# Patient Record
Sex: MALE | Race: WHITE | NOT HISPANIC OR LATINO | Employment: FULL TIME | URBAN - METROPOLITAN AREA
[De-identification: names, ages, dates, MRNs, and addresses within clinical notes are randomized per-mention and may not be internally consistent; named-entity substitution may affect disease eponyms.]

---

## 2017-03-01 DIAGNOSIS — F40.248 FEAR OF PUBLIC SPEAKING: ICD-10-CM

## 2017-03-01 RX ORDER — PROPRANOLOL HYDROCHLORIDE 10 MG/1
10 TABLET ORAL 2 TIMES DAILY PRN
Qty: 180 TABLET | Refills: 3 | Status: SHIPPED | OUTPATIENT
Start: 2017-03-01 | End: 2019-03-26

## 2017-09-23 DIAGNOSIS — E78.00 HYPERCHOLESTEROLEMIA: ICD-10-CM

## 2017-09-23 RX ORDER — ROSUVASTATIN CALCIUM 10 MG/1
TABLET, COATED ORAL
Qty: 30 TABLET | Refills: 0 | Status: SHIPPED | OUTPATIENT
Start: 2017-09-23 | End: 2017-10-31 | Stop reason: SDUPTHER

## 2017-10-31 DIAGNOSIS — E78.00 HYPERCHOLESTEROLEMIA: ICD-10-CM

## 2017-10-31 RX ORDER — ROSUVASTATIN CALCIUM 10 MG/1
TABLET, COATED ORAL
Qty: 30 TABLET | Refills: 0 | Status: SHIPPED | OUTPATIENT
Start: 2017-10-31 | End: 2017-12-14 | Stop reason: SDUPTHER

## 2017-12-14 DIAGNOSIS — E78.00 HYPERCHOLESTEROLEMIA: ICD-10-CM

## 2017-12-15 RX ORDER — ROSUVASTATIN CALCIUM 10 MG/1
TABLET, COATED ORAL
Qty: 30 TABLET | Refills: 0 | Status: SHIPPED | OUTPATIENT
Start: 2017-12-15 | End: 2018-01-25 | Stop reason: SDUPTHER

## 2018-01-10 ENCOUNTER — HOSPITAL ENCOUNTER (OUTPATIENT)
Dept: RADIOLOGY | Facility: OTHER | Age: 48
Discharge: HOME | End: 2018-01-10
Attending: UROLOGY
Payer: COMMERCIAL

## 2018-01-10 ENCOUNTER — TELEPHONE (OUTPATIENT)
Dept: UROLOGY | Facility: CLINIC | Age: 48
End: 2018-01-10

## 2018-01-10 DIAGNOSIS — R10.9 LEFT FLANK PAIN: ICD-10-CM

## 2018-01-10 DIAGNOSIS — N20.0 RENAL STONES: Primary | ICD-10-CM

## 2018-01-10 DIAGNOSIS — M54.89 BACK PAIN DUE TO INFLAMMATORY PROCESS: ICD-10-CM

## 2018-01-10 PROCEDURE — 74176 CT ABD & PELVIS W/O CONTRAST: CPT | Mod: 26,,, | Performed by: RADIOLOGY

## 2018-01-10 PROCEDURE — 74176 CT ABD & PELVIS W/O CONTRAST: CPT | Mod: TC

## 2018-01-10 NOTE — TELEPHONE ENCOUNTER
----- Message from Korey Howe MD sent at 1/10/2018  8:41 AM CST -----  Pt is coming over now for emergency appt  Prostatitis    He is a friend    Thanks    sc

## 2018-01-10 NOTE — PROGRESS NOTES
Subjective:      Eve Choi III is a 47 y.o. male who returns today regarding his     Pt has seronegative rheumatoid arthritis    Pt has left side back pain  No history of stones    No hematuria     No  symptoms    Emergency appt        The following portions of the patient's history were reviewed and updated as appropriate: allergies, current medications, past family history, past medical history, past social history, past surgical history and problem list.    Review of Systems  Pertinent items are noted in HPI.  A comprehensive multipoint review of systems was negative except as otherwise stated in the HPI.     Objective:   Vitals: There were no vitals taken for this visit.    Physical Exam   General: alert and oriented, no acute distress  Respiratory: Symmetric expansion, non-labored breathing  Cardiovascular: no peripheral edema  Abdomen: soft, non distended  Skin: normal coloration and turgor, no rashes, no suspicious skin lesions noted  Neuro: no gross deficits  Psych: normal judgment and insight, normal mood/affect and non-anxious    Physical Exam    Lab Review   Urinalysis demonstrates negative for all components  Lab Results   Component Value Date    WBC 10.05 09/19/2016    HGB 13.9 (L) 09/19/2016    HCT 39.5 (L) 09/19/2016    MCV 88 09/19/2016     09/19/2016     Lab Results   Component Value Date    CREATININE 0.9 09/19/2016    BUN 20 09/19/2016       Imaging  CT renal stone study  No hydro  2mm left renal stone and two (2) 2mm right renal stones    Spinal stensosis at L4-L5  Assessment and Plan:   Renal stones; small nonosbtructing; do not appear to be source of back pain  -     X-Ray KUB; Future; Expected date: 01/10/2018  -     X-Ray KUB; Future; Expected date: 01/10/2018  -     Comprehensive metabolic panel; Future; Expected date: 01/10/2018  -     Uric acid; Future; Expected date: 01/10/2018  High fluid, low salt diet  Avoid coffee, tea and cola  Drink water and diet lemonade      RTC 2  months     Discussed ESWL vs URS    Back pain due to inflammatory process  -     X-Ray KUB; Future; Expected date: 01/10/2018  See rheumatologist  Discussed spinal stenosis with pt; no acute neurologic findings

## 2018-01-11 ENCOUNTER — HOSPITAL ENCOUNTER (OUTPATIENT)
Dept: RADIOLOGY | Facility: OTHER | Age: 48
Discharge: HOME | End: 2018-01-11
Attending: UROLOGY
Payer: COMMERCIAL

## 2018-01-11 DIAGNOSIS — N20.0 RENAL STONES: ICD-10-CM

## 2018-01-11 DIAGNOSIS — N20.0 RENAL STONES: Primary | ICD-10-CM

## 2018-01-11 PROCEDURE — 74018 RADEX ABDOMEN 1 VIEW: CPT | Mod: 26,,, | Performed by: RADIOLOGY

## 2018-01-11 PROCEDURE — 74018 RADEX ABDOMEN 1 VIEW: CPT | Mod: TC,FY

## 2018-01-11 NOTE — PROGRESS NOTES
I called pt with RUFINA and lab results  He will continue the dietary modifications we discussed and see me in 6 months with RUFINA and US  He will see rheumatology and neurosurgery for his back

## 2018-01-25 DIAGNOSIS — E78.00 HYPERCHOLESTEROLEMIA: ICD-10-CM

## 2018-01-25 RX ORDER — ROSUVASTATIN CALCIUM 10 MG/1
TABLET, COATED ORAL
Qty: 30 TABLET | Refills: 0 | Status: SHIPPED | OUTPATIENT
Start: 2018-01-25 | End: 2018-02-28 | Stop reason: SDUPTHER

## 2018-02-03 ENCOUNTER — OFFICE VISIT (OUTPATIENT)
Dept: URGENT CARE | Facility: CLINIC | Age: 48
End: 2018-02-03
Payer: COMMERCIAL

## 2018-02-03 VITALS
BODY MASS INDEX: 34.72 KG/M2 | WEIGHT: 262 LBS | RESPIRATION RATE: 18 BRPM | TEMPERATURE: 99 F | SYSTOLIC BLOOD PRESSURE: 125 MMHG | HEIGHT: 73 IN | DIASTOLIC BLOOD PRESSURE: 94 MMHG | HEART RATE: 100 BPM | OXYGEN SATURATION: 95 %

## 2018-02-03 DIAGNOSIS — J10.1 INFLUENZA B: Primary | ICD-10-CM

## 2018-02-03 LAB
CTP QC/QA: YES
FLUAV AG NPH QL: NEGATIVE
FLUBV AG NPH QL: POSITIVE

## 2018-02-03 PROCEDURE — 3008F BODY MASS INDEX DOCD: CPT | Mod: S$GLB,,, | Performed by: FAMILY MEDICINE

## 2018-02-03 PROCEDURE — 99214 OFFICE O/P EST MOD 30 MIN: CPT | Mod: S$GLB,,, | Performed by: FAMILY MEDICINE

## 2018-02-03 PROCEDURE — 87804 INFLUENZA ASSAY W/OPTIC: CPT | Mod: QW,S$GLB,, | Performed by: FAMILY MEDICINE

## 2018-02-03 RX ORDER — OSELTAMIVIR PHOSPHATE 75 MG/1
75 CAPSULE ORAL 2 TIMES DAILY
Qty: 10 CAPSULE | Refills: 0 | Status: SHIPPED | OUTPATIENT
Start: 2018-02-03 | End: 2018-02-08

## 2018-02-03 RX ORDER — BENZONATATE 100 MG/1
100 CAPSULE ORAL EVERY 6 HOURS PRN
Qty: 30 CAPSULE | Refills: 1 | Status: SHIPPED | OUTPATIENT
Start: 2018-02-03 | End: 2018-06-07

## 2018-02-03 NOTE — PROGRESS NOTES
"Subjective:       Patient ID: Eve Choi III is a 47 y.o. male.    Vitals:  height is 6' 1" (1.854 m) and weight is 118.8 kg (262 lb). His oral temperature is 99 °F (37.2 °C). His blood pressure is 125/94 (abnormal) and his pulse is 100. His respiration is 18 and oxygen saturation is 95%.     Chief Complaint: Cough    symptoms started 3 days ago.  Received flu shot 2 days ago.      Cough   This is a new problem. The current episode started in the past 7 days. The problem has been gradually worsening. The cough is productive of sputum. Associated symptoms include a fever, headaches, myalgias, nasal congestion and a sore throat. Pertinent negatives include no chest pain, chills, ear pain, eye redness, shortness of breath or wheezing. Treatments tried: nsaids. The treatment provided mild relief. His past medical history is significant for bronchitis and pneumonia.     Review of Systems   Constitution: Positive for fever. Negative for chills and malaise/fatigue.   HENT: Positive for congestion and sore throat. Negative for ear pain and hoarse voice.    Eyes: Negative for discharge and redness.   Cardiovascular: Negative for chest pain, dyspnea on exertion and leg swelling.   Respiratory: Positive for cough. Negative for shortness of breath, sputum production and wheezing.    Musculoskeletal: Positive for myalgias.   Gastrointestinal: Negative for abdominal pain and nausea.   Neurological: Positive for headaches.       Objective:      Physical Exam   Constitutional: He appears well-developed and well-nourished.   HENT:   Head: Normocephalic and atraumatic.   Nose: Mucosal edema and rhinorrhea present. Right sinus exhibits maxillary sinus tenderness. Right sinus exhibits no frontal sinus tenderness. Left sinus exhibits maxillary sinus tenderness. Left sinus exhibits no frontal sinus tenderness.   Mouth/Throat: Posterior oropharyngeal erythema present. No oropharyngeal exudate. Tonsils are 1+ on the right. Tonsils are " 1+ on the left.   Cardiovascular: Normal rate, regular rhythm and normal heart sounds.    Pulmonary/Chest: Effort normal and breath sounds normal.   Lymphadenopathy:     He has cervical adenopathy.   Nursing note and vitals reviewed.      Assessment:       1. Influenza B        Plan:         Influenza B  -     POCT Influenza A/B  -     oseltamivir (TAMIFLU) 75 MG capsule; Take 1 capsule (75 mg total) by mouth 2 (two) times daily.  Dispense: 10 capsule; Refill: 0  -     benzonatate (TESSALON PERLES) 100 MG capsule; Take 1 capsule (100 mg total) by mouth every 6 (six) hours as needed for Cough.  Dispense: 30 capsule; Refill: 1

## 2018-02-03 NOTE — PATIENT INSTRUCTIONS

## 2018-02-28 DIAGNOSIS — E78.00 HYPERCHOLESTEROLEMIA: ICD-10-CM

## 2018-02-28 RX ORDER — ROSUVASTATIN CALCIUM 10 MG/1
TABLET, COATED ORAL
Qty: 30 TABLET | Refills: 0 | Status: SHIPPED | OUTPATIENT
Start: 2018-02-28 | End: 2018-04-06 | Stop reason: SDUPTHER

## 2018-04-06 DIAGNOSIS — E78.00 HYPERCHOLESTEROLEMIA: ICD-10-CM

## 2018-04-06 RX ORDER — ROSUVASTATIN CALCIUM 10 MG/1
TABLET, COATED ORAL
Qty: 30 TABLET | Refills: 0 | Status: SHIPPED | OUTPATIENT
Start: 2018-04-06 | End: 2018-09-05 | Stop reason: SDUPTHER

## 2018-05-30 DIAGNOSIS — Z00.00 ROUTINE GENERAL MEDICAL EXAMINATION AT A HEALTH CARE FACILITY: Primary | ICD-10-CM

## 2018-06-07 ENCOUNTER — OFFICE VISIT (OUTPATIENT)
Dept: PULMONOLOGY | Facility: CLINIC | Age: 48
End: 2018-06-07
Payer: COMMERCIAL

## 2018-06-07 ENCOUNTER — HOSPITAL ENCOUNTER (OUTPATIENT)
Dept: CARDIOLOGY | Facility: CLINIC | Age: 48
Discharge: HOME | End: 2018-06-07
Payer: COMMERCIAL

## 2018-06-07 ENCOUNTER — CLINICAL SUPPORT (OUTPATIENT)
Dept: INTERNAL MEDICINE | Facility: CLINIC | Age: 48
End: 2018-06-07
Payer: COMMERCIAL

## 2018-06-07 VITALS
WEIGHT: 264 LBS | SYSTOLIC BLOOD PRESSURE: 120 MMHG | DIASTOLIC BLOOD PRESSURE: 77 MMHG | HEART RATE: 73 BPM | HEIGHT: 73 IN | BODY MASS INDEX: 34.99 KG/M2

## 2018-06-07 DIAGNOSIS — Z00.00 ROUTINE GENERAL MEDICAL EXAMINATION AT A HEALTH CARE FACILITY: Primary | ICD-10-CM

## 2018-06-07 DIAGNOSIS — Z00.00 ROUTINE GENERAL MEDICAL EXAMINATION AT A HEALTH CARE FACILITY: ICD-10-CM

## 2018-06-07 DIAGNOSIS — Z00.00 ANNUAL PHYSICAL EXAM: Primary | ICD-10-CM

## 2018-06-07 LAB
ALBUMIN SERPL BCP-MCNC: 3.9 G/DL
ALP SERPL-CCNC: 52 U/L
ALT SERPL W/O P-5'-P-CCNC: 21 U/L
ANION GAP SERPL CALC-SCNC: 8 MMOL/L
AST SERPL-CCNC: 22 U/L
BILIRUB SERPL-MCNC: 0.6 MG/DL
BUN SERPL-MCNC: 14 MG/DL
CALCIUM SERPL-MCNC: 9.2 MG/DL
CHLORIDE SERPL-SCNC: 108 MMOL/L
CHOLEST SERPL-MCNC: 189 MG/DL
CHOLEST/HDLC SERPL: 3.9 {RATIO}
CO2 SERPL-SCNC: 24 MMOL/L
COMPLEXED PSA SERPL-MCNC: 0.18 NG/ML
CREAT SERPL-MCNC: 1 MG/DL
ERYTHROCYTE [DISTWIDTH] IN BLOOD BY AUTOMATED COUNT: 12.6 %
EST. GFR  (AFRICAN AMERICAN): >60 ML/MIN/1.73 M^2
EST. GFR  (NON AFRICAN AMERICAN): >60 ML/MIN/1.73 M^2
ESTIMATED AVG GLUCOSE: 105 MG/DL
FOLATE SERPL-MCNC: 13.3 NG/ML
GLUCOSE SERPL-MCNC: 100 MG/DL
HBA1C MFR BLD HPLC: 5.3 %
HCT VFR BLD AUTO: 42.8 %
HDLC SERPL-MCNC: 48 MG/DL
HDLC SERPL: 25.4 %
HGB BLD-MCNC: 14.6 G/DL
LDLC SERPL CALC-MCNC: 92.8 MG/DL
MCH RBC QN AUTO: 29.9 PG
MCHC RBC AUTO-ENTMCNC: 34.1 G/DL
MCV RBC AUTO: 88 FL
NONHDLC SERPL-MCNC: 141 MG/DL
PLATELET # BLD AUTO: 315 K/UL
PMV BLD AUTO: 10 FL
POTASSIUM SERPL-SCNC: 4.5 MMOL/L
PROT SERPL-MCNC: 7 G/DL
RBC # BLD AUTO: 4.88 M/UL
SODIUM SERPL-SCNC: 140 MMOL/L
TRIGL SERPL-MCNC: 241 MG/DL
TSH SERPL DL<=0.005 MIU/L-ACNC: 2.41 UIU/ML
WBC # BLD AUTO: 10.81 K/UL

## 2018-06-07 PROCEDURE — 80061 LIPID PANEL: CPT

## 2018-06-07 PROCEDURE — 93000 ELECTROCARDIOGRAM COMPLETE: CPT | Mod: S$GLB,,, | Performed by: INTERNAL MEDICINE

## 2018-06-07 PROCEDURE — 85027 COMPLETE CBC AUTOMATED: CPT

## 2018-06-07 PROCEDURE — 99999 PR PBB SHADOW E&M-EST. PATIENT-LVL III: CPT | Mod: PBBFAC,,, | Performed by: INTERNAL MEDICINE

## 2018-06-07 PROCEDURE — 84443 ASSAY THYROID STIM HORMONE: CPT

## 2018-06-07 PROCEDURE — 99396 PREV VISIT EST AGE 40-64: CPT | Mod: S$GLB,,, | Performed by: INTERNAL MEDICINE

## 2018-06-07 PROCEDURE — 84153 ASSAY OF PSA TOTAL: CPT

## 2018-06-07 PROCEDURE — 82746 ASSAY OF FOLIC ACID SERUM: CPT

## 2018-06-07 PROCEDURE — 80053 COMPREHEN METABOLIC PANEL: CPT

## 2018-06-07 PROCEDURE — 83036 HEMOGLOBIN GLYCOSYLATED A1C: CPT

## 2018-06-07 PROCEDURE — 3078F DIAST BP <80 MM HG: CPT | Mod: CPTII,S$GLB,, | Performed by: INTERNAL MEDICINE

## 2018-06-07 PROCEDURE — 82608 B-12 BINDING CAPACITY: CPT

## 2018-06-07 PROCEDURE — 3074F SYST BP LT 130 MM HG: CPT | Mod: CPTII,S$GLB,, | Performed by: INTERNAL MEDICINE

## 2018-06-07 RX ORDER — ASPIRIN 81 MG/1
81 TABLET ORAL DAILY
COMMUNITY
End: 2023-02-01

## 2018-06-07 NOTE — LETTER
June 7, 2018    Eve Choi III  [address] [facility] [id number]             [facility]  [address]  [facility] [address]  Phone: [phone] DearLynton,        Thank you for allowing me to serve you and perform your Executive Health exam on 6/7/2018. This letter will serve as a brief summary of the physical findings and laboratory/studies performed and recommendations at this time. Except for your elevated triglycerides aggravated by weight, this is a normal exam. 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        If you have any questions or concerns, please don't hesitate to call.    Sincerely,        Jd Sanchez MD

## 2018-06-07 NOTE — PROGRESS NOTES
Subjective:       Patient ID: Eve Choi III is a 47 y.o. male.    Chief Complaint: Annual Exam    HPI  46 yo  comes for his periodic health exam. He went to [facility] for a second opinion regarding his arthritis condition and was diagnosed with anklyosing spondylitis and is taking humira.  He still  Has some generalized stiffness  and will be following up with is local rheumatologist next week. Has some vague symptoms of peripheral neuropathy that comes and goes. Will check B-12 and folate levels. I did not see him in 2017.   Review of Systems   Constitutional: Negative.    HENT: Negative.    Eyes: Negative.    Respiratory: Negative.    Cardiovascular: Negative.    Gastrointestinal: Negative.    Genitourinary: Negative.    Musculoskeletal: Positive for arthralgias and neck pain.   Skin: Negative.    Neurological: Negative.         Vague peripheral neuropathy   Psychiatric/Behavioral: Negative.    All other systems reviewed and are negative.      Objective:      Physical Exam   Constitutional: He is oriented to person, place, and time. He appears well-developed and well-nourished.   Obese; BMI:34   HENT:   Head: Normocephalic and atraumatic.   Right Ear: External ear normal.   Left Ear: External ear normal.   Eyes: Conjunctivae and EOM are normal. Pupils are equal, round, and reactive to light.   Neck: Normal range of motion. Neck supple.   Cardiovascular: Normal rate, regular rhythm and normal heart sounds.    Pulmonary/Chest: Effort normal and breath sounds normal.   Abdominal: Soft. Bowel sounds are normal.   Musculoskeletal: Normal range of motion.   Neurological: He is alert and oriented to person, place, and time. He has normal reflexes.   Skin: Skin is warm and dry.   Psychiatric: He has a normal mood and affect. His behavior is normal. Judgment and thought content normal.       Assessment:       No diagnosis found.    Plan:         Labs: Elevated triglycerides, all other parameters  are normal.EKG is normal. IMP: Type IV hyperlipidemia aggravated by weight. Ankylosing Spondilitis  B-12 and folate levels are normal.

## 2018-06-12 LAB — VIT B12 USB SERPL-MCNC: 909 PG/ML (ref 800–2600)

## 2018-09-05 DIAGNOSIS — E78.00 HYPERCHOLESTEROLEMIA: ICD-10-CM

## 2018-09-05 RX ORDER — ROSUVASTATIN CALCIUM 10 MG/1
TABLET, COATED ORAL
Qty: 30 TABLET | Refills: 0 | Status: SHIPPED | OUTPATIENT
Start: 2018-09-05 | End: 2018-10-20 | Stop reason: SDUPTHER

## 2018-10-20 DIAGNOSIS — E78.00 HYPERCHOLESTEROLEMIA: ICD-10-CM

## 2018-10-21 RX ORDER — ROSUVASTATIN CALCIUM 10 MG/1
TABLET, COATED ORAL
Qty: 30 TABLET | Refills: 0 | Status: SHIPPED | OUTPATIENT
Start: 2018-10-21 | End: 2018-11-22 | Stop reason: SDUPTHER

## 2018-11-22 DIAGNOSIS — E78.00 HYPERCHOLESTEROLEMIA: ICD-10-CM

## 2018-11-23 RX ORDER — ROSUVASTATIN CALCIUM 10 MG/1
TABLET, COATED ORAL
Qty: 30 TABLET | Refills: 0 | Status: SHIPPED | OUTPATIENT
Start: 2018-11-23 | End: 2018-12-26 | Stop reason: SDUPTHER

## 2018-12-26 DIAGNOSIS — E78.00 HYPERCHOLESTEROLEMIA: ICD-10-CM

## 2018-12-27 RX ORDER — ROSUVASTATIN CALCIUM 10 MG/1
TABLET, COATED ORAL
Qty: 30 TABLET | Refills: 0 | Status: SHIPPED | OUTPATIENT
Start: 2018-12-27 | End: 2019-02-22 | Stop reason: SDUPTHER

## 2019-02-22 DIAGNOSIS — E78.00 HYPERCHOLESTEROLEMIA: ICD-10-CM

## 2019-02-22 RX ORDER — ROSUVASTATIN CALCIUM 10 MG/1
TABLET, COATED ORAL
Qty: 30 TABLET | Refills: 0 | Status: SHIPPED | OUTPATIENT
Start: 2019-02-22 | End: 2019-04-10 | Stop reason: SDUPTHER

## 2019-03-24 ENCOUNTER — TELEPHONE (OUTPATIENT)
Dept: UROLOGY | Facility: HOSPITAL | Age: 49
End: 2019-03-24

## 2019-03-24 DIAGNOSIS — N20.0 RENAL STONES: Primary | ICD-10-CM

## 2019-03-24 NOTE — TELEPHONE ENCOUNTER
Pt called  Recently saw his PCP and noted to have micro hematuria and increased Cr 1.35 with elevated BUN  I instructed to hydrate well and have BMP drawn Monday AM.    Please make appt for KUB and renal US Tuesday AM and see me after.  Overbook if necessary

## 2019-03-25 ENCOUNTER — HOSPITAL ENCOUNTER (OUTPATIENT)
Dept: RADIOLOGY | Facility: OTHER | Age: 49
Discharge: HOME | End: 2019-03-25
Attending: UROLOGY
Payer: COMMERCIAL

## 2019-03-25 DIAGNOSIS — N20.0 RENAL STONES: ICD-10-CM

## 2019-03-25 PROCEDURE — 74018 XR KUB: ICD-10-PCS | Mod: 26,,, | Performed by: RADIOLOGY

## 2019-03-25 PROCEDURE — 76770 US EXAM ABDO BACK WALL COMP: CPT | Mod: 26,,, | Performed by: RADIOLOGY

## 2019-03-25 PROCEDURE — 76770 US KIDNEY: ICD-10-PCS | Mod: 26,,, | Performed by: RADIOLOGY

## 2019-03-25 PROCEDURE — 74018 RADEX ABDOMEN 1 VIEW: CPT | Mod: TC,FY

## 2019-03-25 PROCEDURE — 76770 US EXAM ABDO BACK WALL COMP: CPT | Mod: TC

## 2019-03-25 PROCEDURE — 74018 RADEX ABDOMEN 1 VIEW: CPT | Mod: 26,,, | Performed by: RADIOLOGY

## 2019-03-26 ENCOUNTER — OFFICE VISIT (OUTPATIENT)
Dept: UROLOGY | Facility: CLINIC | Age: 49
End: 2019-03-26
Payer: COMMERCIAL

## 2019-03-26 VITALS
SYSTOLIC BLOOD PRESSURE: 118 MMHG | HEART RATE: 65 BPM | HEIGHT: 73 IN | DIASTOLIC BLOOD PRESSURE: 80 MMHG | BODY MASS INDEX: 34.97 KG/M2 | WEIGHT: 263.88 LBS

## 2019-03-26 DIAGNOSIS — N19 ACUTE PRERENAL AZOTEMIA: ICD-10-CM

## 2019-03-26 DIAGNOSIS — R31.29 MICROSCOPIC HEMATURIA: ICD-10-CM

## 2019-03-26 DIAGNOSIS — N20.0 RENAL STONES: Primary | ICD-10-CM

## 2019-03-26 PROCEDURE — 3074F SYST BP LT 130 MM HG: CPT | Mod: CPTII,S$GLB,, | Performed by: UROLOGY

## 2019-03-26 PROCEDURE — 3079F DIAST BP 80-89 MM HG: CPT | Mod: CPTII,S$GLB,, | Performed by: UROLOGY

## 2019-03-26 PROCEDURE — 3008F PR BODY MASS INDEX (BMI) DOCUMENTED: ICD-10-PCS | Mod: CPTII,S$GLB,, | Performed by: UROLOGY

## 2019-03-26 PROCEDURE — 3074F PR MOST RECENT SYSTOLIC BLOOD PRESSURE < 130 MM HG: ICD-10-PCS | Mod: CPTII,S$GLB,, | Performed by: UROLOGY

## 2019-03-26 PROCEDURE — 3008F BODY MASS INDEX DOCD: CPT | Mod: CPTII,S$GLB,, | Performed by: UROLOGY

## 2019-03-26 PROCEDURE — 3079F PR MOST RECENT DIASTOLIC BLOOD PRESSURE 80-89 MM HG: ICD-10-PCS | Mod: CPTII,S$GLB,, | Performed by: UROLOGY

## 2019-03-26 PROCEDURE — 99214 PR OFFICE/OUTPT VISIT, EST, LEVL IV, 30-39 MIN: ICD-10-PCS | Mod: S$GLB,,, | Performed by: UROLOGY

## 2019-03-26 PROCEDURE — 99214 OFFICE O/P EST MOD 30 MIN: CPT | Mod: S$GLB,,, | Performed by: UROLOGY

## 2019-03-26 NOTE — PROGRESS NOTES
"Subjective:      Eve Choi III is a 48 y.o. male who returns today regarding his     Patient has a history of renal stones and ankylosing spondylitis    He had recent blood work done with his rheumatologist, Dr. Calvin Fernandes.  His creatinine was elevated at 1.3 which was up from his baseline.  Importantly, this was drawn after he had a rather intense workout.  He feels that he was dehydrated at the time    We repeated his creatinine with hydration and is now 1.0 which is normal    He had some mild left flank discomfort over the weekend which is resolved.  He was working out again this morning and after doing some crutches has some lower abdominal discomfort which appears positional    No he gross hematuria.  He was noted to have microscopic hematuria on a recent urinalysis  Urinalysis today is negative except trace protein.    The following portions of the patient's history were reviewed and updated as appropriate: allergies, current medications, past family history, past medical history, past social history, past surgical history and problem list.    Review of Systems  Pertinent items are noted in HPI.  A comprehensive multipoint review of systems was negative except as otherwise stated in the HPI.     Objective:   Vitals: /80 (BP Location: Left arm, Patient Position: Sitting, BP Method: Large (Automatic))   Pulse 65   Ht 6' 1" (1.854 m)   Wt 119.7 kg (263 lb 14.3 oz)   BMI 34.82 kg/m²     Physical Exam   General: alert and oriented, no acute distress  Respiratory: Symmetric expansion, non-labored breathing  Cardiovascular: normal to inspection  Abdomen: non distended   Skin: normal coloration and turgor, no rashes, no suspicious skin lesions noted  Neuro: no gross deficits  Psych: normal judgment and insight, normal mood/affect and non-anxious    Physical Exam    Lab Review   Urinalysis demonstrates negative for all components except trace protein    Lab Results   Component Value Date    WBC 10.81 " 06/07/2018    HGB 14.6 06/07/2018    HCT 42.8 06/07/2018    MCV 88 06/07/2018     06/07/2018     Lab Results   Component Value Date    CREATININE 1.0 03/25/2019    BUN 15 03/25/2019       Imaging  KUB no stone seen    Renal ultrasound 2 small nonobstructive left renal stones, no right renal stones    Assessment and Plan:   Renal stones; small nonobstructive, radiolucent  We discussed the possibility of gout or hyperuricosuria  Will obtain 24 hr urine    Discussed metabolic management, ureteroscopy and lithotripsy      Microscopic hematuria  Repeat urinalysis at next appointment  If there are more than 3 blood red blood cells per high-power field on the next urinalysis we will schedule cystoscopy      Acute prerenal azotemia resolved with hydration    Return to clinic 1 month  Follow-up with primary physician, Dr. Piña  Follow-up with Rheumatology common Dr. Fernandes

## 2019-04-10 DIAGNOSIS — E78.00 HYPERCHOLESTEROLEMIA: ICD-10-CM

## 2019-04-10 RX ORDER — ROSUVASTATIN CALCIUM 10 MG/1
TABLET, COATED ORAL
Qty: 30 TABLET | Refills: 0 | Status: SHIPPED | OUTPATIENT
Start: 2019-04-10 | End: 2019-05-09 | Stop reason: SDUPTHER

## 2019-04-30 ENCOUNTER — OFFICE VISIT (OUTPATIENT)
Dept: UROLOGY | Facility: CLINIC | Age: 49
End: 2019-04-30
Payer: COMMERCIAL

## 2019-04-30 VITALS
HEART RATE: 68 BPM | HEIGHT: 73 IN | BODY MASS INDEX: 34.85 KG/M2 | DIASTOLIC BLOOD PRESSURE: 79 MMHG | WEIGHT: 263 LBS | SYSTOLIC BLOOD PRESSURE: 122 MMHG

## 2019-04-30 DIAGNOSIS — N20.0 RENAL STONES: Primary | ICD-10-CM

## 2019-04-30 DIAGNOSIS — M54.89 BACK PAIN DUE TO INFLAMMATORY PROCESS: ICD-10-CM

## 2019-04-30 LAB
BILIRUB SERPL-MCNC: NORMAL MG/DL
BLOOD URINE, POC: NORMAL
COLOR, POC UA: NORMAL
GLUCOSE UR QL STRIP: NORMAL
KETONES UR QL STRIP: NORMAL
LEUKOCYTE ESTERASE URINE, POC: NORMAL
NITRITE, POC UA: NORMAL
PH, POC UA: 6
PROTEIN, POC: NORMAL
SPECIFIC GRAVITY, POC UA: 1.01
UROBILINOGEN, POC UA: NORMAL

## 2019-04-30 PROCEDURE — 3074F PR MOST RECENT SYSTOLIC BLOOD PRESSURE < 130 MM HG: ICD-10-PCS | Mod: CPTII,S$GLB,, | Performed by: UROLOGY

## 2019-04-30 PROCEDURE — 3074F SYST BP LT 130 MM HG: CPT | Mod: CPTII,S$GLB,, | Performed by: UROLOGY

## 2019-04-30 PROCEDURE — 81002 URINALYSIS NONAUTO W/O SCOPE: CPT | Mod: S$GLB,,, | Performed by: UROLOGY

## 2019-04-30 PROCEDURE — 81002 POCT URINE DIPSTICK WITHOUT MICROSCOPE: ICD-10-PCS | Mod: S$GLB,,, | Performed by: UROLOGY

## 2019-04-30 PROCEDURE — 3078F PR MOST RECENT DIASTOLIC BLOOD PRESSURE < 80 MM HG: ICD-10-PCS | Mod: CPTII,S$GLB,, | Performed by: UROLOGY

## 2019-04-30 PROCEDURE — 3008F PR BODY MASS INDEX (BMI) DOCUMENTED: ICD-10-PCS | Mod: CPTII,S$GLB,, | Performed by: UROLOGY

## 2019-04-30 PROCEDURE — 99214 OFFICE O/P EST MOD 30 MIN: CPT | Mod: 25,S$GLB,, | Performed by: UROLOGY

## 2019-04-30 PROCEDURE — 3008F BODY MASS INDEX DOCD: CPT | Mod: CPTII,S$GLB,, | Performed by: UROLOGY

## 2019-04-30 PROCEDURE — 3078F DIAST BP <80 MM HG: CPT | Mod: CPTII,S$GLB,, | Performed by: UROLOGY

## 2019-04-30 PROCEDURE — 99214 PR OFFICE/OUTPT VISIT, EST, LEVL IV, 30-39 MIN: ICD-10-PCS | Mod: 25,S$GLB,, | Performed by: UROLOGY

## 2019-04-30 NOTE — PROGRESS NOTES
"Subjective:      Eve Choi III is a 48 y.o. male who returns today regarding his      here to discuss the results of his 24 hr urine.  No recent stone related issues.   his rheumatologist evaluated him and does not believe that he has gout    The following portions of the patient's history were reviewed and updated as appropriate: allergies, current medications, past family history, past medical history, past social history, past surgical history and problem list.    Review of Systems  Pertinent items are noted in HPI.  A comprehensive multipoint review of systems was negative except as otherwise stated in the HPI.     Objective:   Vitals: /79 (BP Location: Right arm, Patient Position: Sitting, BP Method: Large (Automatic))   Pulse 68   Ht 6' 1" (1.854 m)   Wt 119.3 kg (263 lb)   BMI 34.70 kg/m²     Physical Exam   General: alert and oriented, no acute distress  Respiratory: Symmetric expansion, non-labored breathing  Cardiovascular: normal to inspection  Abdomen: non distended   Skin: normal coloration and turgor, no rashes, no suspicious skin lesions noted  Neuro: no gross deficits  Psych: normal judgment and insight, normal mood/affect and non-anxious    Physical Exam    Lab Review   Urinalysis demonstrates negative for all components   pH 6.0  Lab Results   Component Value Date    WBC 10.81 06/07/2018    HGB 14.6 06/07/2018    HCT 42.8 06/07/2018    MCV 88 06/07/2018     06/07/2018     Lab Results   Component Value Date    CREATININE 1.0 03/25/2019    BUN 15 03/25/2019      24 hr urine   volume 2.9 L   mildly elevated urinary oxalate   mildly decreased urinary citrate   borderline uric acid     serum calcium normal   serum uric acid normal    Imaging   KUB no stones  Visible   ultrasound no hydro.  Two small nonobstructive left renal stone    Assessment and Plan:   Renal stones;  Small nonobstructive  High fluid, low salt diet  Avoid coffee, tea and cola  Drink water and diet lemonade     " we discussed the risk and benefits of potassium citrate.  He prefers not to take additional medicines at this time.  Therefore he will continue the dietary modification   return to clinic 6 months with KUB and renal ultrasound    Back pain due to inflammatory process   continue follow-up with rheumatology

## 2019-05-09 DIAGNOSIS — E78.00 HYPERCHOLESTEROLEMIA: ICD-10-CM

## 2019-05-09 RX ORDER — ROSUVASTATIN CALCIUM 10 MG/1
TABLET, COATED ORAL
Qty: 30 TABLET | Refills: 0 | Status: SHIPPED | OUTPATIENT
Start: 2019-05-09 | End: 2019-05-20 | Stop reason: SDUPTHER

## 2019-05-20 DIAGNOSIS — E78.00 HYPERCHOLESTEROLEMIA: ICD-10-CM

## 2019-05-21 RX ORDER — ROSUVASTATIN CALCIUM 10 MG/1
TABLET, COATED ORAL
Qty: 30 TABLET | Refills: 0 | Status: SHIPPED | OUTPATIENT
Start: 2019-05-21 | End: 2019-06-12 | Stop reason: SDUPTHER

## 2019-06-10 DIAGNOSIS — Z00.00 ROUTINE GENERAL MEDICAL EXAMINATION AT A HEALTH CARE FACILITY: Primary | ICD-10-CM

## 2019-06-10 DIAGNOSIS — E78.00 HYPERCHOLESTEROLEMIA: ICD-10-CM

## 2019-06-10 DIAGNOSIS — E05.90 HYPERTHYROIDISM: ICD-10-CM

## 2019-06-12 RX ORDER — LEVOTHYROXINE SODIUM 100 UG/1
100 TABLET ORAL DAILY
Qty: 90 TABLET | Refills: 3 | Status: SHIPPED | OUTPATIENT
Start: 2019-06-12 | End: 2019-07-02 | Stop reason: SDUPTHER

## 2019-06-12 RX ORDER — ROSUVASTATIN CALCIUM 10 MG/1
10 TABLET, COATED ORAL NIGHTLY
Qty: 30 TABLET | Refills: 0 | Status: SHIPPED | OUTPATIENT
Start: 2019-06-12 | End: 2019-07-02 | Stop reason: SDUPTHER

## 2019-07-02 ENCOUNTER — HOSPITAL ENCOUNTER (OUTPATIENT)
Dept: CARDIOLOGY | Facility: CLINIC | Age: 49
Discharge: HOME | End: 2019-07-02
Payer: COMMERCIAL

## 2019-07-02 ENCOUNTER — OFFICE VISIT (OUTPATIENT)
Dept: PULMONOLOGY | Facility: CLINIC | Age: 49
End: 2019-07-02
Payer: COMMERCIAL

## 2019-07-02 ENCOUNTER — CLINICAL SUPPORT (OUTPATIENT)
Dept: INTERNAL MEDICINE | Facility: CLINIC | Age: 49
End: 2019-07-02
Payer: COMMERCIAL

## 2019-07-02 VITALS
DIASTOLIC BLOOD PRESSURE: 78 MMHG | RESPIRATION RATE: 12 BRPM | WEIGHT: 266.31 LBS | SYSTOLIC BLOOD PRESSURE: 119 MMHG | HEART RATE: 71 BPM | HEIGHT: 73 IN | BODY MASS INDEX: 35.3 KG/M2

## 2019-07-02 DIAGNOSIS — Z00.00 ANNUAL PHYSICAL EXAM: Primary | ICD-10-CM

## 2019-07-02 DIAGNOSIS — E78.00 HYPERCHOLESTEROLEMIA: ICD-10-CM

## 2019-07-02 DIAGNOSIS — M05.79 RHEUMATOID ARTHRITIS INVOLVING MULTIPLE SITES WITH POSITIVE RHEUMATOID FACTOR: Chronic | ICD-10-CM

## 2019-07-02 DIAGNOSIS — E05.90 HYPERTHYROIDISM: ICD-10-CM

## 2019-07-02 DIAGNOSIS — G47.00 INSOMNIA, UNSPECIFIED TYPE: Primary | ICD-10-CM

## 2019-07-02 DIAGNOSIS — Z00.00 ANNUAL PHYSICAL EXAM: ICD-10-CM

## 2019-07-02 DIAGNOSIS — Z00.00 ROUTINE GENERAL MEDICAL EXAMINATION AT A HEALTH CARE FACILITY: Primary | ICD-10-CM

## 2019-07-02 DIAGNOSIS — Z00.00 ROUTINE GENERAL MEDICAL EXAMINATION AT A HEALTH CARE FACILITY: ICD-10-CM

## 2019-07-02 DIAGNOSIS — Z12.11 SPECIAL SCREENING FOR MALIGNANT NEOPLASMS, COLON: Primary | ICD-10-CM

## 2019-07-02 LAB
ALBUMIN SERPL BCP-MCNC: 3.6 G/DL (ref 3.5–5.2)
ALP SERPL-CCNC: 50 U/L (ref 55–135)
ALT SERPL W/O P-5'-P-CCNC: 20 U/L (ref 10–44)
ANION GAP SERPL CALC-SCNC: 8 MMOL/L (ref 8–16)
AST SERPL-CCNC: 25 U/L (ref 10–40)
BILIRUB SERPL-MCNC: 0.5 MG/DL (ref 0.1–1)
BUN SERPL-MCNC: 17 MG/DL (ref 6–20)
CALCIUM SERPL-MCNC: 8.8 MG/DL (ref 8.7–10.5)
CHLORIDE SERPL-SCNC: 109 MMOL/L (ref 95–110)
CHOLEST SERPL-MCNC: 184 MG/DL (ref 120–199)
CHOLEST/HDLC SERPL: 3.8 {RATIO} (ref 2–5)
CO2 SERPL-SCNC: 25 MMOL/L (ref 23–29)
COMPLEXED PSA SERPL-MCNC: 0.18 NG/ML (ref 0–4)
CREAT SERPL-MCNC: 1 MG/DL (ref 0.5–1.4)
ERYTHROCYTE [DISTWIDTH] IN BLOOD BY AUTOMATED COUNT: 13.2 % (ref 11.5–14.5)
EST. GFR  (AFRICAN AMERICAN): >60 ML/MIN/1.73 M^2
EST. GFR  (NON AFRICAN AMERICAN): >60 ML/MIN/1.73 M^2
ESTIMATED AVG GLUCOSE: 111 MG/DL (ref 68–131)
GLUCOSE SERPL-MCNC: 101 MG/DL (ref 70–110)
HBA1C MFR BLD HPLC: 5.5 % (ref 4–5.6)
HCT VFR BLD AUTO: 40.9 % (ref 40–54)
HDLC SERPL-MCNC: 49 MG/DL (ref 40–75)
HDLC SERPL: 26.6 % (ref 20–50)
HGB BLD-MCNC: 14 G/DL (ref 14–18)
LDLC SERPL CALC-MCNC: 107 MG/DL (ref 63–159)
MCH RBC QN AUTO: 30.4 PG (ref 27–31)
MCHC RBC AUTO-ENTMCNC: 34.2 G/DL (ref 32–36)
MCV RBC AUTO: 89 FL (ref 82–98)
NONHDLC SERPL-MCNC: 135 MG/DL
PLATELET # BLD AUTO: 289 K/UL (ref 150–350)
PMV BLD AUTO: 9.7 FL (ref 9.2–12.9)
POTASSIUM SERPL-SCNC: 4.6 MMOL/L (ref 3.5–5.1)
PROT SERPL-MCNC: 6.8 G/DL (ref 6–8.4)
RBC # BLD AUTO: 4.6 M/UL (ref 4.6–6.2)
SODIUM SERPL-SCNC: 142 MMOL/L (ref 136–145)
TRIGL SERPL-MCNC: 140 MG/DL (ref 30–150)
TSH SERPL DL<=0.005 MIU/L-ACNC: 2.38 UIU/ML (ref 0.4–4)
WBC # BLD AUTO: 9.92 K/UL (ref 3.9–12.7)

## 2019-07-02 PROCEDURE — 3078F PR MOST RECENT DIASTOLIC BLOOD PRESSURE < 80 MM HG: ICD-10-PCS | Mod: CPTII,S$GLB,, | Performed by: INTERNAL MEDICINE

## 2019-07-02 PROCEDURE — 84443 ASSAY THYROID STIM HORMONE: CPT

## 2019-07-02 PROCEDURE — 99386 PREV VISIT NEW AGE 40-64: CPT | Mod: S$GLB,,, | Performed by: INTERNAL MEDICINE

## 2019-07-02 PROCEDURE — 85027 COMPLETE CBC AUTOMATED: CPT

## 2019-07-02 PROCEDURE — 80061 LIPID PANEL: CPT

## 2019-07-02 PROCEDURE — 93005 ELECTROCARDIOGRAM TRACING: CPT | Mod: S$GLB,,, | Performed by: INTERNAL MEDICINE

## 2019-07-02 PROCEDURE — 3074F PR MOST RECENT SYSTOLIC BLOOD PRESSURE < 130 MM HG: ICD-10-PCS | Mod: CPTII,S$GLB,, | Performed by: INTERNAL MEDICINE

## 2019-07-02 PROCEDURE — 93010 EKG 12-LEAD: ICD-10-PCS | Mod: S$GLB,,, | Performed by: INTERNAL MEDICINE

## 2019-07-02 PROCEDURE — 99999 PR PBB SHADOW E&M-EST. PATIENT-LVL III: CPT | Mod: PBBFAC,,, | Performed by: INTERNAL MEDICINE

## 2019-07-02 PROCEDURE — 99386 PR PREVENTIVE VISIT,NEW,40-64: ICD-10-PCS | Mod: S$GLB,,, | Performed by: INTERNAL MEDICINE

## 2019-07-02 PROCEDURE — 93005 EKG 12-LEAD: ICD-10-PCS | Mod: S$GLB,,, | Performed by: INTERNAL MEDICINE

## 2019-07-02 PROCEDURE — 93010 ELECTROCARDIOGRAM REPORT: CPT | Mod: S$GLB,,, | Performed by: INTERNAL MEDICINE

## 2019-07-02 PROCEDURE — 80053 COMPREHEN METABOLIC PANEL: CPT

## 2019-07-02 PROCEDURE — 3074F SYST BP LT 130 MM HG: CPT | Mod: CPTII,S$GLB,, | Performed by: INTERNAL MEDICINE

## 2019-07-02 PROCEDURE — 84153 ASSAY OF PSA TOTAL: CPT

## 2019-07-02 PROCEDURE — 3078F DIAST BP <80 MM HG: CPT | Mod: CPTII,S$GLB,, | Performed by: INTERNAL MEDICINE

## 2019-07-02 PROCEDURE — 99999 PR PBB SHADOW E&M-EST. PATIENT-LVL III: ICD-10-PCS | Mod: PBBFAC,,, | Performed by: INTERNAL MEDICINE

## 2019-07-02 PROCEDURE — 83036 HEMOGLOBIN GLYCOSYLATED A1C: CPT

## 2019-07-02 RX ORDER — ZOLPIDEM TARTRATE 6.25 MG/1
6.25 TABLET, FILM COATED, EXTENDED RELEASE ORAL NIGHTLY PRN
Qty: 30 TABLET | Refills: 3 | Status: SHIPPED | OUTPATIENT
Start: 2019-07-02 | End: 2020-11-19 | Stop reason: SDUPTHER

## 2019-07-02 RX ORDER — ROSUVASTATIN CALCIUM 10 MG/1
10 TABLET, COATED ORAL NIGHTLY
Qty: 90 TABLET | Refills: 3 | Status: SHIPPED | OUTPATIENT
Start: 2019-07-02 | End: 2020-09-21

## 2019-07-02 RX ORDER — LEVOTHYROXINE SODIUM 100 UG/1
100 TABLET ORAL DAILY
Qty: 90 TABLET | Refills: 3 | Status: SHIPPED | OUTPATIENT
Start: 2019-07-02 | End: 2019-07-03 | Stop reason: SDUPTHER

## 2019-07-02 RX ORDER — MELOXICAM 15 MG/1
15 TABLET ORAL DAILY
Qty: 90 TABLET | Refills: 3 | Status: SHIPPED | OUTPATIENT
Start: 2019-07-02 | End: 2020-07-22

## 2019-07-02 NOTE — PROGRESS NOTES
Subjective:       Patient ID: Eve Choi III is a 48 y.o. male.    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  Humira lost its effectiveness on new medication. Works out at the [facility] three times a week, work permitting. Meds: Crestor, Mobic and synthyroid.   Review of Systems   Constitutional: Negative.         Obese:  BMI:35   HENT: Negative.    Eyes: Negative.    Respiratory: Negative.    Cardiovascular: Negative.    Gastrointestinal: Negative.    Endocrine:        Synthyroid replacement   Genitourinary: Negative.    Musculoskeletal: Positive for arthralgias.        Ankylosing spondylitis   Skin: Negative.    Neurological: Negative.    Psychiatric/Behavioral: Negative.    All other systems reviewed and are negative.      Objective:      Physical Exam   Constitutional: He is oriented to person, place, and time. He appears well-developed and well-nourished.   HENT:   Head: Normocephalic and atraumatic.   Right Ear: External ear normal.   Left Ear: External ear normal.   Eyes: Pupils are equal, round, and reactive to light. Conjunctivae and EOM are normal.   Neck: Normal range of motion. Neck supple.   Cardiovascular: Normal rate, regular rhythm and normal heart sounds.   Pulmonary/Chest: Effort normal and breath sounds normal.   Peak flow 650 l/min   Abdominal: Soft. Bowel sounds are normal.   Musculoskeletal: Normal range of motion.   Neurological: He is alert and oriented to person, place, and time. He has normal reflexes.   Skin: Skin is warm and dry.   Psychiatric: He has a normal mood and affect. His behavior is normal. Judgment and thought content normal.       Assessment:       1. Insomnia, unspecified type    2. Hyperthyroidism    3. Hypercholesterolemia    4. Rheumatoid arthritis involving multiple sites with positive rheumatoid  factor    5. Annual physical exam        Plan:       Labs: All parameters are normal. EKG is normal. IMP: Ankylosing spondylitis  Overweight.

## 2019-07-02 NOTE — LETTER
July 2, 2019    Eve Choi III  [address] [facility] [address]             [facility]  [address]  [facility] [address]  Phone: [phone] Dear Eve,     Thank you for allowing me to serve you and perform your Executive Health exam on 7/2/2019. This letter will serve as a brief summary of the physical findings and laboratory/studies performed and recommendations at this time. Today's assessment is normal except for your weight. You are under infusion treatment for the ankylosing spondylitis with a good response. I would encourage of focusing on your weight going forward.         If you have any questions or concerns, please don't hesitate to call.    Sincerely,        Jd Sanchez MD

## 2019-07-03 DIAGNOSIS — E05.90 HYPERTHYROIDISM: ICD-10-CM

## 2019-07-03 RX ORDER — LEVOTHYROXINE SODIUM 100 UG/1
100 TABLET ORAL DAILY
Qty: 90 TABLET | Refills: 3 | Status: SHIPPED | OUTPATIENT
Start: 2019-07-03 | End: 2020-11-24 | Stop reason: SDUPTHER

## 2019-08-13 ENCOUNTER — HOSPITAL ENCOUNTER (OUTPATIENT)
Dept: CARDIOLOGY | Facility: CLINIC | Age: 49
Discharge: HOME | End: 2019-08-13
Attending: INTERNAL MEDICINE
Payer: COMMERCIAL

## 2019-08-13 DIAGNOSIS — Z00.00 ROUTINE GENERAL MEDICAL EXAMINATION AT A HEALTH CARE FACILITY: ICD-10-CM

## 2019-08-13 LAB
CV STRESS BASE HR: 63 BPM
DIASTOLIC BLOOD PRESSURE: 90 MMHG
OHS CV CPX 1 MINUTE RECOVERY HEART RATE: 148 BPM
OHS CV CPX 85 PERCENT MAX PREDICTED HEART RATE MALE: 145
OHS CV CPX ESTIMATED METS: 15
OHS CV CPX MAX PREDICTED HEART RATE: 170
OHS CV CPX PATIENT IS FEMALE: 0
OHS CV CPX PATIENT IS MALE: 1
OHS CV CPX PEAK DIASTOLIC BLOOD PRESSURE: 67 MMHG
OHS CV CPX PEAK HEAR RATE: 166 BPM
OHS CV CPX PEAK RATE PRESSURE PRODUCT: 2822
OHS CV CPX PEAK SYSTOLIC BLOOD PRESSURE: 17 MMHG
OHS CV CPX PERCENT MAX PREDICTED HEART RATE ACHIEVED: 98
OHS CV CPX RATE PRESSURE PRODUCT PRESENTING: 8001
STRESS ECHO POST EXERCISE DUR MIN: 9 MINUTES
STRESS ECHO POST EXERCISE DUR SEC: 5 SECONDS
STRESS ECHO TARGET HR: 144.5 BPM
SYSTOLIC BLOOD PRESSURE: 127 MMHG

## 2019-08-13 PROCEDURE — 93015 CV STRESS TEST SUPVJ I&R: CPT | Mod: S$GLB,,, | Performed by: INTERNAL MEDICINE

## 2019-08-13 PROCEDURE — 93015 TREADMILL STRESS TEST (CUPID ONLY): ICD-10-PCS | Mod: S$GLB,,, | Performed by: INTERNAL MEDICINE

## 2019-10-17 ENCOUNTER — PATIENT MESSAGE (OUTPATIENT)
Dept: PULMONOLOGY | Facility: CLINIC | Age: 49
End: 2019-10-17

## 2019-10-17 DIAGNOSIS — J45.901 ASTHMA EXACERBATION IN COPD: Primary | ICD-10-CM

## 2019-10-17 DIAGNOSIS — R05.9 COUGH: ICD-10-CM

## 2019-10-17 DIAGNOSIS — J44.1 ASTHMA EXACERBATION IN COPD: Primary | ICD-10-CM

## 2019-10-17 RX ORDER — BENZONATATE 100 MG/1
100 CAPSULE ORAL 3 TIMES DAILY PRN
Qty: 60 CAPSULE | Refills: 1 | Status: SHIPPED | OUTPATIENT
Start: 2019-10-17 | End: 2019-10-27

## 2019-12-10 ENCOUNTER — PATIENT MESSAGE (OUTPATIENT)
Dept: PULMONOLOGY | Facility: CLINIC | Age: 49
End: 2019-12-10

## 2019-12-11 ENCOUNTER — TELEPHONE (OUTPATIENT)
Dept: PULMONOLOGY | Facility: CLINIC | Age: 49
End: 2019-12-11

## 2019-12-11 RX ORDER — METHYLPREDNISOLONE 4 MG/1
TABLET ORAL
Qty: 1 PACKAGE | Refills: 0 | Status: SHIPPED | OUTPATIENT
Start: 2019-12-11 | End: 2020-01-01

## 2019-12-11 RX ORDER — AZITHROMYCIN 250 MG/1
TABLET, FILM COATED ORAL
Qty: 6 TABLET | Refills: 1 | Status: SHIPPED | OUTPATIENT
Start: 2019-12-11 | End: 2020-11-19

## 2019-12-18 ENCOUNTER — HOSPITAL ENCOUNTER (OUTPATIENT)
Dept: RADIOLOGY | Facility: OTHER | Age: 49
Discharge: HOME | End: 2019-12-18
Attending: UROLOGY
Payer: COMMERCIAL

## 2019-12-18 DIAGNOSIS — N20.0 RENAL STONES: ICD-10-CM

## 2019-12-18 PROCEDURE — 74018 RADEX ABDOMEN 1 VIEW: CPT | Mod: TC,FY

## 2019-12-18 PROCEDURE — 76770 US EXAM ABDO BACK WALL COMP: CPT | Mod: TC

## 2019-12-18 PROCEDURE — 76770 US KIDNEY: ICD-10-PCS | Mod: 26,,, | Performed by: RADIOLOGY

## 2019-12-18 PROCEDURE — 74018 RADEX ABDOMEN 1 VIEW: CPT | Mod: 26,,, | Performed by: RADIOLOGY

## 2019-12-18 PROCEDURE — 74018 XR KUB: ICD-10-PCS | Mod: 26,,, | Performed by: RADIOLOGY

## 2019-12-18 PROCEDURE — 76770 US EXAM ABDO BACK WALL COMP: CPT | Mod: 26,,, | Performed by: RADIOLOGY

## 2019-12-18 NOTE — PROGRESS NOTES
Dr Howe forwarded these results to the patient via [employer].  He will discuss the results with the patient in person at the next appointment.  The patient was instructed to make an appointment if he does not already have one scheduled.

## 2019-12-18 NOTE — PROGRESS NOTES
Dr Howe forwarded these results to the patient via [employer].  He will discuss the results with the patient in person at the next appointment.  The patient was instructed to make an appointment if he does not already have one scheduled.

## 2020-09-28 DIAGNOSIS — Z00.00 ROUTINE GENERAL MEDICAL EXAMINATION AT A HEALTH CARE FACILITY: Primary | ICD-10-CM

## 2020-11-19 DIAGNOSIS — G47.00 INSOMNIA, UNSPECIFIED TYPE: ICD-10-CM

## 2020-11-19 RX ORDER — ZOLPIDEM TARTRATE 6.25 MG/1
6.25 TABLET, FILM COATED, EXTENDED RELEASE ORAL NIGHTLY PRN
Qty: 30 TABLET | Refills: 3 | Status: SHIPPED | OUTPATIENT
Start: 2020-11-19 | End: 2022-01-27 | Stop reason: ALTCHOICE

## 2020-11-24 ENCOUNTER — HOSPITAL ENCOUNTER (OUTPATIENT)
Dept: RADIOLOGY | Facility: HOSPITAL | Age: 50
Discharge: HOME | End: 2020-11-24
Attending: INTERNAL MEDICINE
Payer: COMMERCIAL

## 2020-11-24 ENCOUNTER — OFFICE VISIT (OUTPATIENT)
Dept: PULMONOLOGY | Facility: CLINIC | Age: 50
End: 2020-11-24
Payer: COMMERCIAL

## 2020-11-24 ENCOUNTER — CLINICAL SUPPORT (OUTPATIENT)
Dept: INTERNAL MEDICINE | Facility: CLINIC | Age: 50
End: 2020-11-24
Payer: COMMERCIAL

## 2020-11-24 ENCOUNTER — HOSPITAL ENCOUNTER (OUTPATIENT)
Dept: CARDIOLOGY | Facility: CLINIC | Age: 50
Discharge: HOME | End: 2020-11-24
Payer: COMMERCIAL

## 2020-11-24 ENCOUNTER — PATIENT MESSAGE (OUTPATIENT)
Dept: PULMONOLOGY | Facility: CLINIC | Age: 50
End: 2020-11-24

## 2020-11-24 VITALS
BODY MASS INDEX: 35.78 KG/M2 | WEIGHT: 270 LBS | HEART RATE: 76 BPM | DIASTOLIC BLOOD PRESSURE: 86 MMHG | SYSTOLIC BLOOD PRESSURE: 130 MMHG | HEIGHT: 73 IN

## 2020-11-24 DIAGNOSIS — Z00.00 ANNUAL PHYSICAL EXAM: Primary | ICD-10-CM

## 2020-11-24 DIAGNOSIS — Z00.00 ROUTINE GENERAL MEDICAL EXAMINATION AT A HEALTH CARE FACILITY: ICD-10-CM

## 2020-11-24 LAB
ALBUMIN SERPL BCP-MCNC: 3.8 G/DL (ref 3.5–5.2)
ALP SERPL-CCNC: 54 U/L (ref 55–135)
ALT SERPL W/O P-5'-P-CCNC: 21 U/L (ref 10–44)
ANION GAP SERPL CALC-SCNC: 8 MMOL/L (ref 8–16)
AST SERPL-CCNC: 24 U/L (ref 10–40)
BILIRUB SERPL-MCNC: 0.5 MG/DL (ref 0.1–1)
BUN SERPL-MCNC: 17 MG/DL (ref 6–20)
CALCIUM SERPL-MCNC: 8.5 MG/DL (ref 8.7–10.5)
CHLORIDE SERPL-SCNC: 108 MMOL/L (ref 95–110)
CHOLEST SERPL-MCNC: 183 MG/DL (ref 120–199)
CHOLEST/HDLC SERPL: 3.9 {RATIO} (ref 2–5)
CO2 SERPL-SCNC: 23 MMOL/L (ref 23–29)
COMPLEXED PSA SERPL-MCNC: 0.2 NG/ML (ref 0–4)
CREAT SERPL-MCNC: 1 MG/DL (ref 0.5–1.4)
ERYTHROCYTE [DISTWIDTH] IN BLOOD BY AUTOMATED COUNT: 12.3 % (ref 11.5–14.5)
EST. GFR  (AFRICAN AMERICAN): >60 ML/MIN/1.73 M^2
EST. GFR  (NON AFRICAN AMERICAN): >60 ML/MIN/1.73 M^2
ESTIMATED AVG GLUCOSE: 105 MG/DL (ref 68–131)
GLUCOSE SERPL-MCNC: 103 MG/DL (ref 70–110)
HBA1C MFR BLD HPLC: 5.3 % (ref 4–5.6)
HCT VFR BLD AUTO: 41.3 % (ref 40–54)
HDLC SERPL-MCNC: 47 MG/DL (ref 40–75)
HDLC SERPL: 25.7 % (ref 20–50)
HGB BLD-MCNC: 13.7 G/DL (ref 14–18)
LDLC SERPL CALC-MCNC: 89.6 MG/DL (ref 63–159)
MCH RBC QN AUTO: 30.1 PG (ref 27–31)
MCHC RBC AUTO-ENTMCNC: 33.2 G/DL (ref 32–36)
MCV RBC AUTO: 91 FL (ref 82–98)
NONHDLC SERPL-MCNC: 136 MG/DL
PLATELET # BLD AUTO: 289 K/UL (ref 150–350)
PMV BLD AUTO: 9.6 FL (ref 9.2–12.9)
POTASSIUM SERPL-SCNC: 4.5 MMOL/L (ref 3.5–5.1)
PROT SERPL-MCNC: 6.7 G/DL (ref 6–8.4)
RBC # BLD AUTO: 4.55 M/UL (ref 4.6–6.2)
SODIUM SERPL-SCNC: 139 MMOL/L (ref 136–145)
TRIGL SERPL-MCNC: 232 MG/DL (ref 30–150)
TSH SERPL DL<=0.005 MIU/L-ACNC: 2.34 UIU/ML (ref 0.4–4)
WBC # BLD AUTO: 10.92 K/UL (ref 3.9–12.7)

## 2020-11-24 PROCEDURE — 1126F PR PAIN SEVERITY QUANTIFIED, NO PAIN PRESENT: ICD-10-PCS | Mod: S$GLB,,, | Performed by: INTERNAL MEDICINE

## 2020-11-24 PROCEDURE — 3075F SYST BP GE 130 - 139MM HG: CPT | Mod: CPTII,S$GLB,, | Performed by: INTERNAL MEDICINE

## 2020-11-24 PROCEDURE — 93005 EKG 12-LEAD: ICD-10-PCS | Mod: S$GLB,,, | Performed by: INTERNAL MEDICINE

## 2020-11-24 PROCEDURE — 71046 XR CHEST PA AND LATERAL: ICD-10-PCS | Mod: 26,,, | Performed by: RADIOLOGY

## 2020-11-24 PROCEDURE — 3008F PR BODY MASS INDEX (BMI) DOCUMENTED: ICD-10-PCS | Mod: CPTII,S$GLB,, | Performed by: INTERNAL MEDICINE

## 2020-11-24 PROCEDURE — 85027 COMPLETE CBC AUTOMATED: CPT

## 2020-11-24 PROCEDURE — 84153 ASSAY OF PSA TOTAL: CPT

## 2020-11-24 PROCEDURE — 71046 X-RAY EXAM CHEST 2 VIEWS: CPT | Mod: 26,,, | Performed by: RADIOLOGY

## 2020-11-24 PROCEDURE — 93010 EKG 12-LEAD: ICD-10-PCS | Mod: S$GLB,,, | Performed by: INTERNAL MEDICINE

## 2020-11-24 PROCEDURE — 80061 LIPID PANEL: CPT

## 2020-11-24 PROCEDURE — 83036 HEMOGLOBIN GLYCOSYLATED A1C: CPT

## 2020-11-24 PROCEDURE — 93005 ELECTROCARDIOGRAM TRACING: CPT | Mod: S$GLB,,, | Performed by: INTERNAL MEDICINE

## 2020-11-24 PROCEDURE — 3008F BODY MASS INDEX DOCD: CPT | Mod: CPTII,S$GLB,, | Performed by: INTERNAL MEDICINE

## 2020-11-24 PROCEDURE — 99999 PR PBB SHADOW E&M-EST. PATIENT-LVL III: ICD-10-PCS | Mod: PBBFAC,,, | Performed by: INTERNAL MEDICINE

## 2020-11-24 PROCEDURE — 1126F AMNT PAIN NOTED NONE PRSNT: CPT | Mod: S$GLB,,, | Performed by: INTERNAL MEDICINE

## 2020-11-24 PROCEDURE — 99999 PR PBB SHADOW E&M-EST. PATIENT-LVL III: CPT | Mod: PBBFAC,,, | Performed by: INTERNAL MEDICINE

## 2020-11-24 PROCEDURE — 3075F PR MOST RECENT SYSTOLIC BLOOD PRESS GE 130-139MM HG: ICD-10-PCS | Mod: CPTII,S$GLB,, | Performed by: INTERNAL MEDICINE

## 2020-11-24 PROCEDURE — 93010 ELECTROCARDIOGRAM REPORT: CPT | Mod: S$GLB,,, | Performed by: INTERNAL MEDICINE

## 2020-11-24 PROCEDURE — 99396 PREV VISIT EST AGE 40-64: CPT | Mod: S$GLB,,, | Performed by: INTERNAL MEDICINE

## 2020-11-24 PROCEDURE — 3079F DIAST BP 80-89 MM HG: CPT | Mod: CPTII,S$GLB,, | Performed by: INTERNAL MEDICINE

## 2020-11-24 PROCEDURE — 71046 X-RAY EXAM CHEST 2 VIEWS: CPT | Mod: TC,FY

## 2020-11-24 PROCEDURE — 80053 COMPREHEN METABOLIC PANEL: CPT

## 2020-11-24 PROCEDURE — 84443 ASSAY THYROID STIM HORMONE: CPT

## 2020-11-24 PROCEDURE — 99396 PR PREVENTIVE VISIT,EST,40-64: ICD-10-PCS | Mod: S$GLB,,, | Performed by: INTERNAL MEDICINE

## 2020-11-24 PROCEDURE — 3079F PR MOST RECENT DIASTOLIC BLOOD PRESSURE 80-89 MM HG: ICD-10-PCS | Mod: CPTII,S$GLB,, | Performed by: INTERNAL MEDICINE

## 2020-11-24 RX ORDER — MELOXICAM 15 MG/1
TABLET ORAL
COMMUNITY
End: 2022-01-27

## 2020-11-24 RX ORDER — ETANERCEPT 25 MG
KIT SUBCUTANEOUS
COMMUNITY
End: 2022-01-27

## 2020-11-24 RX ORDER — ETANERCEPT 50 MG/ML
SOLUTION SUBCUTANEOUS
COMMUNITY
Start: 2020-01-01 | End: 2023-01-23

## 2020-11-24 NOTE — PROGRESS NOTES
Subjective:       Patient ID: Eve Choi III is a 50 y.o. male.    Chief Complaint: No chief complaint on file.    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 He is feeling well, is taking Enbrel for his anklyosing spondylitis with good results, he is followed by Dr. Fernandes.  Has continued to gain weight in spite of good intentions and working out 2-3 times a week. No significant medical complaints today.Will re focus on weight loss, might benefit from a formal consult from one of our nutritionist.   Review of Systems   Constitutional: Negative.    HENT: Negative.    Eyes: Negative.    Respiratory: Negative.    Cardiovascular: Negative.    Gastrointestinal: Negative.    Endocrine:        Chronic thyroid replacement   Genitourinary: Negative.    Musculoskeletal: Negative.         On enbrel for ankylosing spondilyitis   Integumentary:  Negative.   Neurological: Negative.    Psychiatric/Behavioral: Negative.    All other systems reviewed and are negative.        Objective:      Physical Exam  Constitutional:       Appearance: He is well-developed.      Comments: Obese:  BMI:35   HENT:      Head: Normocephalic and atraumatic.      Right Ear: External ear normal.      Left Ear: External ear normal.   Eyes:      Conjunctiva/sclera: Conjunctivae normal.      Pupils: Pupils are equal, round, and reactive to light.   Neck:      Musculoskeletal: Normal range of motion and neck supple.   Cardiovascular:      Rate and Rhythm: Normal rate and regular rhythm.      Heart sounds: Normal heart sounds.   Pulmonary:      Effort: Pulmonary effort is normal.      Breath sounds: Normal breath sounds.   Abdominal:      General: Bowel sounds are normal.      Palpations: Abdomen is soft.   Musculoskeletal: Normal range of motion.   Skin:     General: Skin is warm and dry.   Neurological:      Mental Status: He is alert and oriented to person, place, and time.      Deep Tendon Reflexes: Reflexes  are normal and symmetric.   Psychiatric:         Behavior: Behavior normal.         Thought Content: Thought content normal.         Judgment: Judgment normal.         Assessment:       1. Annual physical exam        Plan:       Labs: Slightly low calcium, Triglycerides: 232,All other parameters are normal.   EKG: Normal    Chest x-ray is clear  IMP:  Anklyosing  Spondylyitis   Type IV Hyperlipidemia and Obesity

## 2020-11-24 NOTE — LETTER
November 24, 2020    Eve Choi III  [address] [facility] [address]             [facility] [address] [address]  [address]  [facility] [address]  Phone: [phone] Dear Eve,         Thank you for allowing me to serve you and perform your Executive Health exam on 11/24/2020. This letter will serve as a brief summary of the physical findings and laboratory/studies performed and recommendations at this time. The most pressing issue at this visit is your weight. Your ankylosing spondylitis is doing well on Enbrel.         If you have any questions or concerns, please don't hesitate to call.    Sincerely,        Jd Sanchez MD

## 2020-12-10 DIAGNOSIS — Z01.818 PRE-OP TESTING: ICD-10-CM

## 2020-12-10 DIAGNOSIS — Z12.11 SPECIAL SCREENING FOR MALIGNANT NEOPLASMS, COLON: Primary | ICD-10-CM

## 2020-12-10 RX ORDER — SODIUM, POTASSIUM,MAG SULFATES 17.5-3.13G
1 SOLUTION, RECONSTITUTED, ORAL ORAL DAILY
Qty: 1 KIT | Refills: 0 | Status: SHIPPED | OUTPATIENT
Start: 2020-12-10 | End: 2020-12-12

## 2020-12-13 ENCOUNTER — LAB VISIT (OUTPATIENT)
Dept: URGENT CARE | Facility: CLINIC | Age: 50
End: 2020-12-13
Payer: COMMERCIAL

## 2020-12-13 DIAGNOSIS — Z01.818 PRE-OP TESTING: ICD-10-CM

## 2020-12-13 PROCEDURE — 99211 OFF/OP EST MAY X REQ PHY/QHP: CPT | Mod: S$GLB,CS,, | Performed by: FAMILY MEDICINE

## 2020-12-13 PROCEDURE — U0003 INFECTIOUS AGENT DETECTION BY NUCLEIC ACID (DNA OR RNA); SEVERE ACUTE RESPIRATORY SYNDROME CORONAVIRUS 2 (SARS-COV-2) (CORONAVIRUS DISEASE [COVID-19]), AMPLIFIED PROBE TECHNIQUE, MAKING USE OF HIGH THROUGHPUT TECHNOLOGIES AS DESCRIBED BY CMS-2020-01-R: HCPCS

## 2020-12-13 PROCEDURE — 99211 PR OFFICE/OUTPT VISIT, EST, LEVL I: ICD-10-PCS | Mod: S$GLB,CS,, | Performed by: FAMILY MEDICINE

## 2020-12-14 LAB — SARS-COV-2 RNA RESP QL NAA+PROBE: NOT DETECTED

## 2021-03-19 ENCOUNTER — TELEPHONE (OUTPATIENT)
Dept: ENDOSCOPY | Facility: HOSPITAL | Age: 51
End: 2021-03-19

## 2021-03-19 DIAGNOSIS — Z12.11 COLON CANCER SCREENING: Primary | ICD-10-CM

## 2021-03-19 DIAGNOSIS — Z01.818 PRE-OP TESTING: ICD-10-CM

## 2021-03-19 RX ORDER — SODIUM, POTASSIUM,MAG SULFATES 17.5-3.13G
SOLUTION, RECONSTITUTED, ORAL ORAL
Qty: 1 KIT | Refills: 0 | Status: SHIPPED | OUTPATIENT
Start: 2021-03-19 | End: 2022-01-27

## 2021-03-22 ENCOUNTER — LAB VISIT (OUTPATIENT)
Dept: INTERNAL MEDICINE | Facility: CLINIC | Age: 51
End: 2021-03-22
Payer: COMMERCIAL

## 2021-03-22 DIAGNOSIS — Z01.818 PRE-OP TESTING: ICD-10-CM

## 2021-03-22 PROCEDURE — U0005 INFEC AGEN DETEC AMPLI PROBE: HCPCS | Performed by: CLINICAL NURSE SPECIALIST

## 2021-03-22 PROCEDURE — U0003 INFECTIOUS AGENT DETECTION BY NUCLEIC ACID (DNA OR RNA); SEVERE ACUTE RESPIRATORY SYNDROME CORONAVIRUS 2 (SARS-COV-2) (CORONAVIRUS DISEASE [COVID-19]), AMPLIFIED PROBE TECHNIQUE, MAKING USE OF HIGH THROUGHPUT TECHNOLOGIES AS DESCRIBED BY CMS-2020-01-R: HCPCS | Performed by: CLINICAL NURSE SPECIALIST

## 2021-03-23 LAB — SARS-COV-2 RNA RESP QL NAA+PROBE: NOT DETECTED

## 2021-03-25 ENCOUNTER — HOSPITAL ENCOUNTER (OUTPATIENT)
Facility: HOSPITAL | Age: 51
Discharge: HOME | End: 2021-03-25
Attending: STUDENT IN AN ORGANIZED HEALTH CARE EDUCATION/TRAINING PROGRAM | Admitting: STUDENT IN AN ORGANIZED HEALTH CARE EDUCATION/TRAINING PROGRAM
Payer: COMMERCIAL

## 2021-03-25 ENCOUNTER — ANESTHESIA EVENT (OUTPATIENT)
Dept: ENDOSCOPY | Facility: HOSPITAL | Age: 51
End: 2021-03-25
Payer: COMMERCIAL

## 2021-03-25 ENCOUNTER — ANESTHESIA (OUTPATIENT)
Dept: ENDOSCOPY | Facility: HOSPITAL | Age: 51
End: 2021-03-25
Payer: COMMERCIAL

## 2021-03-25 VITALS
BODY MASS INDEX: 34.46 KG/M2 | WEIGHT: 260 LBS | HEIGHT: 73 IN | OXYGEN SATURATION: 96 % | SYSTOLIC BLOOD PRESSURE: 126 MMHG | RESPIRATION RATE: 16 BRPM | DIASTOLIC BLOOD PRESSURE: 86 MMHG | HEART RATE: 65 BPM | TEMPERATURE: 98 F

## 2021-03-25 DIAGNOSIS — Z12.11 ENCOUNTER FOR SCREENING COLONOSCOPY: Primary | ICD-10-CM

## 2021-03-25 PROCEDURE — 37000009 HC ANESTHESIA EA ADD 15 MINS: Performed by: STUDENT IN AN ORGANIZED HEALTH CARE EDUCATION/TRAINING PROGRAM

## 2021-03-25 PROCEDURE — 27201012 HC FORCEPS, HOT/COLD, DISP: Performed by: STUDENT IN AN ORGANIZED HEALTH CARE EDUCATION/TRAINING PROGRAM

## 2021-03-25 PROCEDURE — 25000003 PHARM REV CODE 250: Performed by: STUDENT IN AN ORGANIZED HEALTH CARE EDUCATION/TRAINING PROGRAM

## 2021-03-25 PROCEDURE — 25000003 PHARM REV CODE 250: Performed by: NURSE ANESTHETIST, CERTIFIED REGISTERED

## 2021-03-25 PROCEDURE — E9220 PRA ENDO ANESTHESIA: ICD-10-PCS | Mod: 33,,, | Performed by: NURSE ANESTHETIST, CERTIFIED REGISTERED

## 2021-03-25 PROCEDURE — 37000008 HC ANESTHESIA 1ST 15 MINUTES: Performed by: STUDENT IN AN ORGANIZED HEALTH CARE EDUCATION/TRAINING PROGRAM

## 2021-03-25 PROCEDURE — 45380 PR COLONOSCOPY,BIOPSY: ICD-10-PCS | Mod: 33,,, | Performed by: STUDENT IN AN ORGANIZED HEALTH CARE EDUCATION/TRAINING PROGRAM

## 2021-03-25 PROCEDURE — 45380 COLONOSCOPY AND BIOPSY: CPT | Mod: 33,,, | Performed by: STUDENT IN AN ORGANIZED HEALTH CARE EDUCATION/TRAINING PROGRAM

## 2021-03-25 PROCEDURE — 88305 TISSUE EXAM BY PATHOLOGIST: ICD-10-PCS | Mod: 26,,, | Performed by: PATHOLOGY

## 2021-03-25 PROCEDURE — 45380 COLONOSCOPY AND BIOPSY: CPT | Performed by: STUDENT IN AN ORGANIZED HEALTH CARE EDUCATION/TRAINING PROGRAM

## 2021-03-25 PROCEDURE — E9220 PRA ENDO ANESTHESIA: HCPCS | Mod: 33,,, | Performed by: NURSE ANESTHETIST, CERTIFIED REGISTERED

## 2021-03-25 PROCEDURE — 88305 TISSUE EXAM BY PATHOLOGIST: CPT | Performed by: PATHOLOGY

## 2021-03-25 PROCEDURE — 88305 TISSUE EXAM BY PATHOLOGIST: CPT | Mod: 26,,, | Performed by: PATHOLOGY

## 2021-03-25 PROCEDURE — 63600175 PHARM REV CODE 636 W HCPCS: Performed by: NURSE ANESTHETIST, CERTIFIED REGISTERED

## 2021-03-25 RX ORDER — CELECOXIB 100 MG/1
100 CAPSULE ORAL
COMMUNITY

## 2021-03-25 RX ORDER — LIDOCAINE HCL/PF 100 MG/5ML
SYRINGE (ML) INTRAVENOUS
Status: DISCONTINUED | OUTPATIENT
Start: 2021-03-25 | End: 2021-03-25

## 2021-03-25 RX ORDER — SODIUM CHLORIDE 9 MG/ML
INJECTION, SOLUTION INTRAVENOUS CONTINUOUS
Status: DISCONTINUED | OUTPATIENT
Start: 2021-03-25 | End: 2021-03-25 | Stop reason: HOSPADM

## 2021-03-25 RX ORDER — PROPOFOL 10 MG/ML
VIAL (ML) INTRAVENOUS
Status: DISCONTINUED | OUTPATIENT
Start: 2021-03-25 | End: 2021-03-25

## 2021-03-25 RX ORDER — PROPOFOL 10 MG/ML
VIAL (ML) INTRAVENOUS CONTINUOUS PRN
Status: DISCONTINUED | OUTPATIENT
Start: 2021-03-25 | End: 2021-03-25

## 2021-03-25 RX ADMIN — PROPOFOL 100 MG: 10 INJECTION, EMULSION INTRAVENOUS at 09:03

## 2021-03-25 RX ADMIN — Medication 100 MG: at 09:03

## 2021-03-25 RX ADMIN — GLYCOPYRROLATE 0.2 MG: 0.2 INJECTION, SOLUTION INTRAMUSCULAR; INTRAVITREAL at 09:03

## 2021-03-25 RX ADMIN — SODIUM CHLORIDE: 0.9 INJECTION, SOLUTION INTRAVENOUS at 08:03

## 2021-03-25 RX ADMIN — PROPOFOL 150 MCG/KG/MIN: 10 INJECTION, EMULSION INTRAVENOUS at 09:03

## 2021-03-30 LAB
FINAL PATHOLOGIC DIAGNOSIS: NORMAL
GROSS: NORMAL
Lab: NORMAL

## 2021-04-16 ENCOUNTER — PATIENT MESSAGE (OUTPATIENT)
Dept: RESEARCH | Facility: HOSPITAL | Age: 51
End: 2021-04-16

## 2021-06-09 ENCOUNTER — PATIENT MESSAGE (OUTPATIENT)
Dept: PULMONOLOGY | Facility: CLINIC | Age: 51
End: 2021-06-09

## 2021-06-22 ENCOUNTER — PATIENT MESSAGE (OUTPATIENT)
Dept: PULMONOLOGY | Facility: CLINIC | Age: 51
End: 2021-06-22

## 2021-06-30 ENCOUNTER — PATIENT MESSAGE (OUTPATIENT)
Dept: PULMONOLOGY | Facility: CLINIC | Age: 51
End: 2021-06-30

## 2021-08-02 ENCOUNTER — TELEPHONE (OUTPATIENT)
Dept: ADMINISTRATIVE | Facility: HOSPITAL | Age: 51
End: 2021-08-02

## 2021-08-10 DIAGNOSIS — M05.79 RHEUMATOID ARTHRITIS INVOLVING MULTIPLE SITES WITH POSITIVE RHEUMATOID FACTOR: Chronic | ICD-10-CM

## 2021-08-10 RX ORDER — MELOXICAM 15 MG/1
15 TABLET ORAL DAILY
Qty: 90 TABLET | Refills: 3 | Status: SHIPPED | OUTPATIENT
Start: 2021-08-10 | End: 2022-09-13

## 2022-01-05 DIAGNOSIS — Z00.00 ROUTINE GENERAL MEDICAL EXAMINATION AT A HEALTH CARE FACILITY: Primary | ICD-10-CM

## 2022-01-10 DIAGNOSIS — Z00.00 ROUTINE GENERAL MEDICAL EXAMINATION AT A HEALTH CARE FACILITY: Primary | ICD-10-CM

## 2022-01-27 ENCOUNTER — CLINICAL SUPPORT (OUTPATIENT)
Dept: INTERNAL MEDICINE | Facility: CLINIC | Age: 52
End: 2022-01-27
Payer: COMMERCIAL

## 2022-01-27 ENCOUNTER — OFFICE VISIT (OUTPATIENT)
Dept: INTERNAL MEDICINE | Facility: CLINIC | Age: 52
End: 2022-01-27
Attending: FAMILY MEDICINE
Payer: COMMERCIAL

## 2022-01-27 ENCOUNTER — HOSPITAL ENCOUNTER (OUTPATIENT)
Dept: CARDIOLOGY | Facility: CLINIC | Age: 52
Discharge: HOME | End: 2022-01-27
Payer: COMMERCIAL

## 2022-01-27 VITALS
DIASTOLIC BLOOD PRESSURE: 84 MMHG | HEIGHT: 73 IN | BODY MASS INDEX: 36.93 KG/M2 | HEART RATE: 67 BPM | OXYGEN SATURATION: 97 % | SYSTOLIC BLOOD PRESSURE: 112 MMHG | WEIGHT: 278.69 LBS

## 2022-01-27 DIAGNOSIS — E78.5 HYPERLIPIDEMIA, UNSPECIFIED HYPERLIPIDEMIA TYPE: ICD-10-CM

## 2022-01-27 DIAGNOSIS — E78.00 HYPERCHOLESTEROLEMIA: ICD-10-CM

## 2022-01-27 DIAGNOSIS — E05.90 HYPERTHYROIDISM: ICD-10-CM

## 2022-01-27 DIAGNOSIS — M05.79 RHEUMATOID ARTHRITIS INVOLVING MULTIPLE SITES WITH POSITIVE RHEUMATOID FACTOR: Chronic | ICD-10-CM

## 2022-01-27 DIAGNOSIS — Z00.00 ROUTINE GENERAL MEDICAL EXAMINATION AT A HEALTH CARE FACILITY: ICD-10-CM

## 2022-01-27 DIAGNOSIS — Z00.00 ROUTINE GENERAL MEDICAL EXAMINATION AT A HEALTH CARE FACILITY: Primary | ICD-10-CM

## 2022-01-27 DIAGNOSIS — Z80.0 FAMILY HISTORY- STOMACH CANCER: ICD-10-CM

## 2022-01-27 DIAGNOSIS — R07.9 CHEST PAIN, UNSPECIFIED TYPE: ICD-10-CM

## 2022-01-27 DIAGNOSIS — E03.9 HYPOTHYROIDISM (ACQUIRED): ICD-10-CM

## 2022-01-27 DIAGNOSIS — K21.9 GASTROESOPHAGEAL REFLUX DISEASE, UNSPECIFIED WHETHER ESOPHAGITIS PRESENT: ICD-10-CM

## 2022-01-27 DIAGNOSIS — Z80.42 FAMILY HISTORY OF PROSTATE CANCER: ICD-10-CM

## 2022-01-27 DIAGNOSIS — Z80.0 FAMILY HISTORY OF COLON CANCER: ICD-10-CM

## 2022-01-27 DIAGNOSIS — Z00.00 ANNUAL PHYSICAL EXAM: Primary | ICD-10-CM

## 2022-01-27 LAB
ALBUMIN SERPL BCP-MCNC: 3.7 G/DL (ref 3.5–5.2)
ALP SERPL-CCNC: 49 U/L (ref 55–135)
ALT SERPL W/O P-5'-P-CCNC: 22 U/L (ref 10–44)
ANION GAP SERPL CALC-SCNC: 6 MMOL/L (ref 8–16)
AST SERPL-CCNC: 23 U/L (ref 10–40)
BILIRUB SERPL-MCNC: 0.4 MG/DL (ref 0.1–1)
BUN SERPL-MCNC: 14 MG/DL (ref 6–20)
CALCIUM SERPL-MCNC: 8.9 MG/DL (ref 8.7–10.5)
CHLORIDE SERPL-SCNC: 109 MMOL/L (ref 95–110)
CHOLEST SERPL-MCNC: 158 MG/DL (ref 120–199)
CHOLEST/HDLC SERPL: 3.5 {RATIO} (ref 2–5)
CO2 SERPL-SCNC: 24 MMOL/L (ref 23–29)
COMPLEXED PSA SERPL-MCNC: 0.43 NG/ML (ref 0–4)
CREAT SERPL-MCNC: 1 MG/DL (ref 0.5–1.4)
ERYTHROCYTE [DISTWIDTH] IN BLOOD BY AUTOMATED COUNT: 12.3 % (ref 11.5–14.5)
EST. GFR  (AFRICAN AMERICAN): >60 ML/MIN/1.73 M^2
EST. GFR  (NON AFRICAN AMERICAN): >60 ML/MIN/1.73 M^2
ESTIMATED AVG GLUCOSE: 108 MG/DL (ref 68–131)
GLUCOSE SERPL-MCNC: 109 MG/DL (ref 70–110)
HBA1C MFR BLD: 5.4 % (ref 4–5.6)
HCT VFR BLD AUTO: 42.5 % (ref 40–54)
HDLC SERPL-MCNC: 45 MG/DL (ref 40–75)
HDLC SERPL: 28.5 % (ref 20–50)
HGB BLD-MCNC: 13.8 G/DL (ref 14–18)
LDLC SERPL CALC-MCNC: 77.8 MG/DL (ref 63–159)
MCH RBC QN AUTO: 29.4 PG (ref 27–31)
MCHC RBC AUTO-ENTMCNC: 32.5 G/DL (ref 32–36)
MCV RBC AUTO: 91 FL (ref 82–98)
NONHDLC SERPL-MCNC: 113 MG/DL
PLATELET # BLD AUTO: 304 K/UL (ref 150–450)
PMV BLD AUTO: 9.6 FL (ref 9.2–12.9)
POTASSIUM SERPL-SCNC: 4.2 MMOL/L (ref 3.5–5.1)
PROT SERPL-MCNC: 7 G/DL (ref 6–8.4)
RBC # BLD AUTO: 4.69 M/UL (ref 4.6–6.2)
SODIUM SERPL-SCNC: 139 MMOL/L (ref 136–145)
TRIGL SERPL-MCNC: 176 MG/DL (ref 30–150)
TSH SERPL DL<=0.005 MIU/L-ACNC: 3.3 UIU/ML (ref 0.4–4)
WBC # BLD AUTO: 9.95 K/UL (ref 3.9–12.7)

## 2022-01-27 PROCEDURE — 93010 ELECTROCARDIOGRAM REPORT: CPT | Mod: S$GLB,,, | Performed by: INTERNAL MEDICINE

## 2022-01-27 PROCEDURE — 99386 PR PREVENTIVE VISIT,NEW,40-64: ICD-10-PCS | Mod: S$GLB,,, | Performed by: FAMILY MEDICINE

## 2022-01-27 PROCEDURE — 80053 COMPREHEN METABOLIC PANEL: CPT | Performed by: FAMILY MEDICINE

## 2022-01-27 PROCEDURE — 99999 PR PBB SHADOW E&M-EST. PATIENT-LVL III: CPT | Mod: PBBFAC,,, | Performed by: FAMILY MEDICINE

## 2022-01-27 PROCEDURE — 3074F SYST BP LT 130 MM HG: CPT | Mod: CPTII,S$GLB,, | Performed by: FAMILY MEDICINE

## 2022-01-27 PROCEDURE — 3008F BODY MASS INDEX DOCD: CPT | Mod: CPTII,S$GLB,, | Performed by: FAMILY MEDICINE

## 2022-01-27 PROCEDURE — 83036 HEMOGLOBIN GLYCOSYLATED A1C: CPT | Performed by: FAMILY MEDICINE

## 2022-01-27 PROCEDURE — 99999 PR PBB SHADOW E&M-EST. PATIENT-LVL I: ICD-10-PCS | Mod: PBBFAC,,,

## 2022-01-27 PROCEDURE — 85027 COMPLETE CBC AUTOMATED: CPT | Performed by: FAMILY MEDICINE

## 2022-01-27 PROCEDURE — 1160F RVW MEDS BY RX/DR IN RCRD: CPT | Mod: CPTII,S$GLB,, | Performed by: FAMILY MEDICINE

## 2022-01-27 PROCEDURE — 99386 PREV VISIT NEW AGE 40-64: CPT | Mod: S$GLB,,, | Performed by: FAMILY MEDICINE

## 2022-01-27 PROCEDURE — 84153 ASSAY OF PSA TOTAL: CPT | Performed by: FAMILY MEDICINE

## 2022-01-27 PROCEDURE — 99999 PR PBB SHADOW E&M-EST. PATIENT-LVL I: CPT | Mod: PBBFAC,,,

## 2022-01-27 PROCEDURE — 99999 PR PBB SHADOW E&M-EST. PATIENT-LVL III: ICD-10-PCS | Mod: PBBFAC,,, | Performed by: FAMILY MEDICINE

## 2022-01-27 PROCEDURE — 3074F PR MOST RECENT SYSTOLIC BLOOD PRESSURE < 130 MM HG: ICD-10-PCS | Mod: CPTII,S$GLB,, | Performed by: FAMILY MEDICINE

## 2022-01-27 PROCEDURE — 3079F PR MOST RECENT DIASTOLIC BLOOD PRESSURE 80-89 MM HG: ICD-10-PCS | Mod: CPTII,S$GLB,, | Performed by: FAMILY MEDICINE

## 2022-01-27 PROCEDURE — 3044F PR MOST RECENT HEMOGLOBIN A1C LEVEL <7.0%: ICD-10-PCS | Mod: CPTII,S$GLB,, | Performed by: FAMILY MEDICINE

## 2022-01-27 PROCEDURE — 1160F PR REVIEW ALL MEDS BY PRESCRIBER/CLIN PHARMACIST DOCUMENTED: ICD-10-PCS | Mod: CPTII,S$GLB,, | Performed by: FAMILY MEDICINE

## 2022-01-27 PROCEDURE — 1159F PR MEDICATION LIST DOCUMENTED IN MEDICAL RECORD: ICD-10-PCS | Mod: CPTII,S$GLB,, | Performed by: FAMILY MEDICINE

## 2022-01-27 PROCEDURE — 93005 EKG 12-LEAD: ICD-10-PCS | Mod: S$GLB,,, | Performed by: FAMILY MEDICINE

## 2022-01-27 PROCEDURE — 80061 LIPID PANEL: CPT | Performed by: FAMILY MEDICINE

## 2022-01-27 PROCEDURE — 93010 EKG 12-LEAD: ICD-10-PCS | Mod: S$GLB,,, | Performed by: INTERNAL MEDICINE

## 2022-01-27 PROCEDURE — 86803 HEPATITIS C AB TEST: CPT | Performed by: FAMILY MEDICINE

## 2022-01-27 PROCEDURE — 3008F PR BODY MASS INDEX (BMI) DOCUMENTED: ICD-10-PCS | Mod: CPTII,S$GLB,, | Performed by: FAMILY MEDICINE

## 2022-01-27 PROCEDURE — 3079F DIAST BP 80-89 MM HG: CPT | Mod: CPTII,S$GLB,, | Performed by: FAMILY MEDICINE

## 2022-01-27 PROCEDURE — 84443 ASSAY THYROID STIM HORMONE: CPT | Performed by: FAMILY MEDICINE

## 2022-01-27 PROCEDURE — 93005 ELECTROCARDIOGRAM TRACING: CPT | Mod: S$GLB,,, | Performed by: FAMILY MEDICINE

## 2022-01-27 PROCEDURE — 1159F MED LIST DOCD IN RCRD: CPT | Mod: CPTII,S$GLB,, | Performed by: FAMILY MEDICINE

## 2022-01-27 PROCEDURE — 3044F HG A1C LEVEL LT 7.0%: CPT | Mod: CPTII,S$GLB,, | Performed by: FAMILY MEDICINE

## 2022-01-27 RX ORDER — ROSUVASTATIN CALCIUM 10 MG/1
10 TABLET, COATED ORAL NIGHTLY
Qty: 90 TABLET | Refills: 3 | Status: SHIPPED | OUTPATIENT
Start: 2022-01-27 | End: 2023-01-23 | Stop reason: SDUPTHER

## 2022-01-27 RX ORDER — LEVOTHYROXINE SODIUM 100 UG/1
100 TABLET ORAL DAILY
Qty: 90 TABLET | Refills: 3 | Status: SHIPPED | OUTPATIENT
Start: 2022-01-27 | End: 2023-01-23 | Stop reason: SDUPTHER

## 2022-01-27 NOTE — PATIENT INSTRUCTIONS
Shingrix vaccine soon.    TDAP vaccine soon.    Information about cholesterol, high blood pressure and healthy diet and activity recommendations can be found at the following links on the Internet:    http://www.nhlbi.nih.gov/health/health-topics/topics/hbc  http://www.nhlbi.nih.gov/health/educational/lose_wt/index.htm  Http://www.nhlbi.nih.gov/files/docs/public/heart/hbp_low.pdf  http://www.heart.org/HEARTORG/  http://diabetes.org/  https://www.cdc.gov/  Https://healthfinder.gov/  https://health.gov/dietaryguidelines/2015/guidelines/  https://health.gov/paguidelines/second-edition/pdf/Physical_Activity_Guidelines_2nd_edition.pdf

## 2022-01-27 NOTE — PROGRESS NOTES
Subjective:       Patient ID: Eve Choi III is a 51 y.o. male.    Chief Complaint: Annual Exam    New patient to establish care and annual wellness check, physical exam.  P, S, Fm, Soc Hx's; Meds, allergies reviewed and reconciled.  Health maintenance file reviewed and addressed items due.  Problem list items reviewed and modified or added entries (in the overview section) may not be transcribed into this encounter note due to note writer format.    Brother sees me, no acute complaints today.  Scheduled for [facility] health physical examination.  Laboratory was reviewed.  He did mention some chest pain which occurs lasting seconds.  Nonexertional.  No exertional complaints are present.  This is been present for a few weeks or longer.  No associated dyspnea, syncope, near syncope, palpitations.  No established cardiac history.  Physical examination EKG today without apparent acute changes.    Review of Systems   Constitutional: Negative for chills, fatigue, fever and unexpected weight change.   HENT: Negative for congestion and trouble swallowing.    Eyes: Negative for redness and visual disturbance.   Respiratory: Negative for cough, chest tightness and shortness of breath.    Cardiovascular: Positive for chest pain. Negative for palpitations and leg swelling.   Gastrointestinal: Negative for abdominal pain and blood in stool.        Occ indigestion   Genitourinary: Negative for difficulty urinating and hematuria.   Musculoskeletal: Negative for arthralgias, back pain, gait problem, joint swelling, myalgias and neck pain.   Skin: Negative for color change and rash.   Neurological: Negative for tremors, speech difficulty, weakness, numbness and headaches.   Hematological: Negative for adenopathy. Does not bruise/bleed easily.   Psychiatric/Behavioral: Negative for behavioral problems, confusion and sleep disturbance. The patient is not nervous/anxious.        Objective:      Physical Exam  Vitals and nursing  note reviewed.   Constitutional:       Appearance: He is well-developed and well-nourished. He is not diaphoretic.   Eyes:      General: No scleral icterus.  Neck:      Thyroid: No thyromegaly.      Vascular: No carotid bruit or JVD.      Trachea: No tracheal deviation.   Cardiovascular:      Rate and Rhythm: Normal rate and regular rhythm.      Pulses: Intact distal pulses.      Heart sounds: Normal heart sounds. No murmur heard.  No friction rub. No gallop.    Pulmonary:      Effort: Pulmonary effort is normal. No respiratory distress.      Breath sounds: Normal breath sounds. No wheezing or rales.   Abdominal:      General: There is no distension.      Palpations: Abdomen is soft. There is no mass.      Tenderness: There is no abdominal tenderness. There is no guarding or rebound.   Musculoskeletal:         General: No edema.      Cervical back: Normal range of motion and neck supple.   Lymphadenopathy:      Cervical: No cervical adenopathy.   Skin:     General: Skin is warm and dry.      Findings: No erythema or rash.   Neurological:      Mental Status: He is alert and oriented to person, place, and time.      Cranial Nerves: No cranial nerve deficit.      Motor: No tremor.      Coordination: Coordination normal.      Gait: Gait normal.   Psychiatric:         Mood and Affect: Mood and affect normal.         Behavior: Behavior normal.         Thought Content: Thought content normal.         Judgment: Judgment normal.         Assessment:       1. Annual physical exam    2. Rheumatoid arthritis involving multiple sites with positive rheumatoid factor    3. Chest pain, unspecified type    4. Gastroesophageal reflux disease, unspecified whether esophagitis present    5. Hyperlipidemia, unspecified hyperlipidemia type    6. Hypothyroidism (acquired)    7. Family history of colon cancer    8. Family history stomach cancer    9. Family history of prostate cancer    10. Hypercholesterolemia    11. Hyperthyroidism         Plan:     Medication List with Changes/Refills   Current Medications    ASPIRIN (ECOTRIN) 81 MG EC TABLET    Take 81 mg by mouth once daily.    CELECOXIB (CELEBREX) 100 MG CAPSULE    Take 100 mg by mouth.    ETANERCEPT (ENBREL MINI) 50 MG/ML (1 ML) CRTG        MELOXICAM (MOBIC) 15 MG TABLET    Take 1 tablet (15 mg total) by mouth once daily.    MULTIVITAMIN CAPSULE    Take 1 capsule by mouth once daily.   Changed and/or Refilled Medications    Modified Medication Previous Medication    LEVOTHYROXINE (SYNTHROID) 100 MCG TABLET levothyroxine (SYNTHROID) 100 MCG tablet       Take 1 tablet (100 mcg total) by mouth once daily.    Take 1 tablet (100 mcg total) by mouth once daily.    ROSUVASTATIN (CRESTOR) 10 MG TABLET rosuvastatin (CRESTOR) 10 MG tablet       Take 1 tablet (10 mg total) by mouth every evening.    TAKE 1 TABLET(10 MG) BY MOUTH EVERY EVENING   Discontinued Medications    ETANERCEPT (ENBREL) 25 MG (1 ML) SOLR INJECTION        MELOXICAM (MOBIC) 15 MG TABLET        SODIUM,POTASSIUM,MAG SULFATES (SUPREP BOWEL PREP KIT) 17.5-3.13-1.6 GRAM SOLR    Please dispense 1 kit in lieu of nationwide backorder of golytely.    ZOLPIDEM (AMBIEN CR) 6.25 MG CR TABLET    Take 1 tablet (6.25 mg total) by mouth nightly as needed for Insomnia.     Eve was seen today for annual exam.    Diagnoses and all orders for this visit:    Annual physical exam    Rheumatoid arthritis involving multiple sites with positive rheumatoid factor    Chest pain, unspecified type  -     Stress Echo Which stress agent will be used? Treadmill Exercise; Future  -     Ambulatory referral/consult to Gastroenterology; Future    Gastroesophageal reflux disease, unspecified whether esophagitis present  -     Ambulatory referral/consult to Gastroenterology; Future    Hyperlipidemia, unspecified hyperlipidemia type    Hypothyroidism (acquired)    Family history of colon cancer    Family history stomach cancer    Family history of prostate  cancer  Comments:  mat uncle    Hypercholesterolemia  -     rosuvastatin (CRESTOR) 10 MG tablet; Take 1 tablet (10 mg total) by mouth every evening.    Hyperthyroidism  -     levothyroxine (SYNTHROID) 100 MCG tablet; Take 1 tablet (100 mcg total) by mouth once daily.      See meds, orders, follow up, routing and instructions sections of encounter and AVS. Discussed with patient and provided on AVS.    Lab Results   Component Value Date     01/27/2022    K 4.2 01/27/2022     01/27/2022    BUN 14 01/27/2022    CREATININE 1.0 01/27/2022     01/27/2022    HGBA1C 5.4 01/27/2022    AST 23 01/27/2022    ALT 22 01/27/2022    ALBUMIN 3.7 01/27/2022    PROT 7.0 01/27/2022    BILITOT 0.4 01/27/2022    CHOL 158 01/27/2022    HDL 45 01/27/2022    LDLCALC 77.8 01/27/2022    TRIG 176 (H) 01/27/2022    WBC 9.95 01/27/2022    HGB 13.8 (L) 01/27/2022    HCT 42.5 01/27/2022     01/27/2022    PSA 0.43 01/27/2022    TSH 3.300 01/27/2022    URICACID 6.2 01/11/2018

## 2022-01-28 LAB — HCV AB SERPL QL IA: NEGATIVE

## 2022-02-01 ENCOUNTER — HOSPITAL ENCOUNTER (OUTPATIENT)
Dept: CARDIOLOGY | Facility: HOSPITAL | Age: 52
Discharge: HOME | End: 2022-02-01
Attending: FAMILY MEDICINE
Payer: COMMERCIAL

## 2022-02-01 DIAGNOSIS — R07.9 CHEST PAIN, UNSPECIFIED TYPE: ICD-10-CM

## 2022-02-01 LAB
CV STRESS BASE HR: 105 BPM
DIASTOLIC BLOOD PRESSURE: 75 MMHG
EJECTION FRACTION: 55 %
OHS CV CPX 1 MINUTE RECOVERY HEART RATE: 157 BPM
OHS CV CPX 85 PERCENT MAX PREDICTED HEART RATE MALE: 142
OHS CV CPX ESTIMATED METS: 12
OHS CV CPX MAX PREDICTED HEART RATE: 167
OHS CV CPX PATIENT IS FEMALE: 0
OHS CV CPX PATIENT IS MALE: 1
OHS CV CPX PEAK DIASTOLIC BLOOD PRESSURE: 67 MMHG
OHS CV CPX PEAK HEAR RATE: 193 BPM
OHS CV CPX PEAK RATE PRESSURE PRODUCT: NORMAL
OHS CV CPX PEAK SYSTOLIC BLOOD PRESSURE: 144 MMHG
OHS CV CPX PERCENT MAX PREDICTED HEART RATE ACHIEVED: 116
OHS CV CPX RATE PRESSURE PRODUCT PRESENTING: NORMAL
STRESS ANGINA INDEX: 0
STRESS ECHO POST EXERCISE DUR MIN: 10 MINUTES
STRESS ECHO POST EXERCISE DUR SEC: 7 SECONDS
SYSTOLIC BLOOD PRESSURE: 128 MMHG

## 2022-02-01 PROCEDURE — 93351 STRESS ECHO (CUPID ONLY): ICD-10-PCS | Mod: 26,,, | Performed by: INTERNAL MEDICINE

## 2022-02-01 PROCEDURE — 93351 STRESS TTE COMPLETE: CPT | Mod: 26,,, | Performed by: INTERNAL MEDICINE

## 2022-02-01 PROCEDURE — 93351 STRESS TTE COMPLETE: CPT

## 2022-02-23 DIAGNOSIS — D84.9 IMMUNOSUPPRESSED STATUS: ICD-10-CM

## 2022-04-04 ENCOUNTER — PATIENT MESSAGE (OUTPATIENT)
Dept: PHARMACY | Facility: CLINIC | Age: 52
End: 2022-04-04
Payer: COMMERCIAL

## 2022-06-09 ENCOUNTER — PATIENT MESSAGE (OUTPATIENT)
Dept: INTERNAL MEDICINE | Facility: CLINIC | Age: 52
End: 2022-06-09
Payer: COMMERCIAL

## 2022-06-09 DIAGNOSIS — E66.9 OBESITY, UNSPECIFIED CLASSIFICATION, UNSPECIFIED OBESITY TYPE, UNSPECIFIED WHETHER SERIOUS COMORBIDITY PRESENT: Primary | ICD-10-CM

## 2022-06-13 ENCOUNTER — TELEPHONE (OUTPATIENT)
Dept: BARIATRICS | Facility: CLINIC | Age: 52
End: 2022-06-13
Payer: COMMERCIAL

## 2022-06-13 NOTE — TELEPHONE ENCOUNTER
----- Message from Tremontana Chevalier sent at 6/13/2022  2:39 PM CDT -----  Regarding: appt  Contact: pt @ [phone]  Pt calling to schedule a bariatric appt with Karime Stevenson from referral in epic.  E66.9 (ICD-10-CM) - Obesity, unspecified classification, unspecified obesity type, unspecified whether serious comorbidity present.    No avail appts in epic until 08/22/22. Pt says he needs a sooner appt. Pls call pt @ [phone].

## 2022-08-08 ENCOUNTER — TELEPHONE (OUTPATIENT)
Dept: UROLOGY | Facility: CLINIC | Age: 52
End: 2022-08-08
Payer: COMMERCIAL

## 2022-08-08 NOTE — TELEPHONE ENCOUNTER
----- Message from Korey Howe MD sent at 8/8/2022 10:52 AM CDT -----  Mr Choi is having scrotal pain    He is in [place] today and tomorrow    Please offer him an appt with Yareli Green NP on Wednesday and Thursday or with me on Friday    Thanks!    Sc

## 2022-08-23 ENCOUNTER — TELEPHONE (OUTPATIENT)
Dept: BARIATRICS | Facility: CLINIC | Age: 52
End: 2022-08-23
Payer: COMMERCIAL

## 2022-08-29 ENCOUNTER — TELEPHONE (OUTPATIENT)
Dept: BARIATRICS | Facility: CLINIC | Age: 52
End: 2022-08-29
Payer: COMMERCIAL

## 2022-08-30 ENCOUNTER — OFFICE VISIT (OUTPATIENT)
Dept: UROLOGY | Facility: CLINIC | Age: 52
End: 2022-08-30
Payer: COMMERCIAL

## 2022-08-30 VITALS — SYSTOLIC BLOOD PRESSURE: 131 MMHG | DIASTOLIC BLOOD PRESSURE: 78 MMHG | HEART RATE: 75 BPM

## 2022-08-30 DIAGNOSIS — N50.811 PAIN IN BOTH TESTICLES: Primary | ICD-10-CM

## 2022-08-30 DIAGNOSIS — N50.812 PAIN IN BOTH TESTICLES: Primary | ICD-10-CM

## 2022-08-30 DIAGNOSIS — N20.0 NEPHROLITHIASIS: ICD-10-CM

## 2022-08-30 LAB
BILIRUB SERPL-MCNC: NEGATIVE MG/DL
BLOOD URINE, POC: NEGATIVE
CLARITY, POC UA: CLEAR
COLOR, POC UA: YELLOW
GLUCOSE UR QL STRIP: NORMAL
KETONES UR QL STRIP: NEGATIVE
LEUKOCYTE ESTERASE URINE, POC: NEGATIVE
NITRITE, POC UA: NEGATIVE
PH, POC UA: 5
PROTEIN, POC: NEGATIVE
SPECIFIC GRAVITY, POC UA: 1
UROBILINOGEN, POC UA: NORMAL

## 2022-08-30 PROCEDURE — 3044F PR MOST RECENT HEMOGLOBIN A1C LEVEL <7.0%: ICD-10-PCS | Mod: CPTII,S$GLB,, | Performed by: NURSE PRACTITIONER

## 2022-08-30 PROCEDURE — 1159F MED LIST DOCD IN RCRD: CPT | Mod: CPTII,S$GLB,, | Performed by: NURSE PRACTITIONER

## 2022-08-30 PROCEDURE — 99203 OFFICE O/P NEW LOW 30 MIN: CPT | Mod: S$GLB,,, | Performed by: NURSE PRACTITIONER

## 2022-08-30 PROCEDURE — 3075F PR MOST RECENT SYSTOLIC BLOOD PRESS GE 130-139MM HG: ICD-10-PCS | Mod: CPTII,S$GLB,, | Performed by: NURSE PRACTITIONER

## 2022-08-30 PROCEDURE — 3078F DIAST BP <80 MM HG: CPT | Mod: CPTII,S$GLB,, | Performed by: NURSE PRACTITIONER

## 2022-08-30 PROCEDURE — 1160F RVW MEDS BY RX/DR IN RCRD: CPT | Mod: CPTII,S$GLB,, | Performed by: NURSE PRACTITIONER

## 2022-08-30 PROCEDURE — 3078F PR MOST RECENT DIASTOLIC BLOOD PRESSURE < 80 MM HG: ICD-10-PCS | Mod: CPTII,S$GLB,, | Performed by: NURSE PRACTITIONER

## 2022-08-30 PROCEDURE — 99203 PR OFFICE/OUTPT VISIT, NEW, LEVL III, 30-44 MIN: ICD-10-PCS | Mod: S$GLB,,, | Performed by: NURSE PRACTITIONER

## 2022-08-30 PROCEDURE — 1159F PR MEDICATION LIST DOCUMENTED IN MEDICAL RECORD: ICD-10-PCS | Mod: CPTII,S$GLB,, | Performed by: NURSE PRACTITIONER

## 2022-08-30 PROCEDURE — 3044F HG A1C LEVEL LT 7.0%: CPT | Mod: CPTII,S$GLB,, | Performed by: NURSE PRACTITIONER

## 2022-08-30 PROCEDURE — 81002 POCT URINE DIPSTICK WITHOUT MICROSCOPE: ICD-10-PCS | Mod: S$GLB,,, | Performed by: NURSE PRACTITIONER

## 2022-08-30 PROCEDURE — 81002 URINALYSIS NONAUTO W/O SCOPE: CPT | Mod: S$GLB,,, | Performed by: NURSE PRACTITIONER

## 2022-08-30 PROCEDURE — 3075F SYST BP GE 130 - 139MM HG: CPT | Mod: CPTII,S$GLB,, | Performed by: NURSE PRACTITIONER

## 2022-08-30 PROCEDURE — 1160F PR REVIEW ALL MEDS BY PRESCRIBER/CLIN PHARMACIST DOCUMENTED: ICD-10-PCS | Mod: CPTII,S$GLB,, | Performed by: NURSE PRACTITIONER

## 2022-08-30 NOTE — PROGRESS NOTES
Subjective:      Eve Choi III is a 52 y.o. male who presents today regarding his testicular discomfort. He is an estbalished patient of Dr. Howe, last seen in 2019 and new to me today.    History of kidney stones. Most recently he states that he passed a stone 6-8 weeks ago. The stone was not collected but the pain resolved.    States that he had slight discomfort with urination but this has resolved. He reports urinary frequency/urgency at baseline- denies worsening of symptoms. He presents today reporting vague testicular discomfort that has almost resolved- unable to distinguish if the discomfort is left or right sided. There are no aggravating or alleviating factors. Denies penile discharge and potential STD exposure. Denies gross hematuria, flank pain and fever/chills. Denies perineal pain.     Component      Latest Ref Rng & Units 1/27/2022   PSA, Screen      0.00 - 4.00 ng/mL 0.43     The following portions of the patient's history were reviewed and updated as appropriate: allergies, current medications, past family history, past medical history, past social history, past surgical history and problem list.    Review of Systems  Constitutional: no fever or chills  ENT: no nasal congestion or sore throat  Respiratory: no cough or shortness of breath  Cardiovascular: no chest pain or palpitations  Gastrointestinal: no nausea or vomiting, tolerating diet  Genitourinary: as per HPI  Hematologic/Lymphatic: no easy bruising or lymphadenopathy  Musculoskeletal: no arthralgias or myalgias  Neurological: no seizures or tremors  Behavioral/Psych: no auditory or visual hallucinations     Objective:   Vitals:   Vitals:    08/30/22 1331   BP: 131/78   Pulse: 75       Physical Exam   General: alert and oriented, no acute distress  Head: normocephalic, atraumatic  Neck: supple, normal ROM  Respiratory: Symmetric expansion, non-labored breathing  Cardiovascular: regular rate and rhythm  Abdomen: soft, non tender,  non distended  Genitourinary:   Penis: normal, no lesions, patent orthotopic meatus, no plaques  Scrotum: no rashes or skin changes; no cellulitis   Testes: descended bilaterally, no masses, nontender, normal epididymides bilaterally (approximately 2 cm right epididymal cyst- non tender), no hydroceles  Skin: normal coloration and turgor, no rashes, no suspicious skin lesions noted  Neuro: alert and oriented x3, no gross deficits  Psych: normal judgment and insight, normal mood/affect, and non-anxious    Lab Review   Urinalysis demonstrates negative for all components  Lab Results   Component Value Date    WBC 9.95 01/27/2022    HGB 13.8 (L) 01/27/2022    HCT 42.5 01/27/2022    MCV 91 01/27/2022     01/27/2022     Lab Results   Component Value Date    CREATININE 1.0 01/27/2022    BUN 14 01/27/2022     Lab Results   Component Value Date    PSA 0.43 01/27/2022     Imaging  None    Assessment:   Pain in both testicles  Nephrolithiasis    Plan:   Eve was seen today for testicle pain.    Diagnoses and all orders for this visit:    Pain in both testicles  -     POCT URINE DIPSTICK WITHOUT MICROSCOPE    Nephrolithiasis    Plan:  --No evidence of an epididymal or urinary tract infection today   --If pain worsens or fails to improve recommend a scrotal US and CT stone study   --Discussed conservative measures for relieving testicular pain - scrotal support, ibuprofen, scrotal elevation, ice packs  --Discussed stone surveillance including KUB- pt declines for now  --Follow up PRN

## 2022-09-01 ENCOUNTER — TELEPHONE (OUTPATIENT)
Dept: BARIATRICS | Facility: CLINIC | Age: 52
End: 2022-09-01
Payer: COMMERCIAL

## 2022-09-22 ENCOUNTER — PATIENT MESSAGE (OUTPATIENT)
Dept: INTERNAL MEDICINE | Facility: CLINIC | Age: 52
End: 2022-09-22
Payer: COMMERCIAL

## 2022-09-22 DIAGNOSIS — E66.9 OBESITY, UNSPECIFIED CLASSIFICATION, UNSPECIFIED OBESITY TYPE, UNSPECIFIED WHETHER SERIOUS COMORBIDITY PRESENT: Primary | ICD-10-CM

## 2022-09-30 ENCOUNTER — OFFICE VISIT (OUTPATIENT)
Dept: ENDOCRINOLOGY | Facility: CLINIC | Age: 52
End: 2022-09-30
Attending: FAMILY MEDICINE
Payer: COMMERCIAL

## 2022-09-30 VITALS
WEIGHT: 274.06 LBS | OXYGEN SATURATION: 96 % | HEART RATE: 69 BPM | SYSTOLIC BLOOD PRESSURE: 126 MMHG | DIASTOLIC BLOOD PRESSURE: 82 MMHG | BODY MASS INDEX: 36.32 KG/M2 | HEIGHT: 73 IN

## 2022-09-30 DIAGNOSIS — R63.5 WEIGHT GAIN: ICD-10-CM

## 2022-09-30 DIAGNOSIS — E03.9 HYPOTHYROIDISM (ACQUIRED): ICD-10-CM

## 2022-09-30 DIAGNOSIS — E66.9 OBESITY, UNSPECIFIED CLASSIFICATION, UNSPECIFIED OBESITY TYPE, UNSPECIFIED WHETHER SERIOUS COMORBIDITY PRESENT: Primary | ICD-10-CM

## 2022-09-30 PROCEDURE — 3074F SYST BP LT 130 MM HG: CPT | Mod: CPTII,S$GLB,, | Performed by: INTERNAL MEDICINE

## 2022-09-30 PROCEDURE — 3079F DIAST BP 80-89 MM HG: CPT | Mod: CPTII,S$GLB,, | Performed by: INTERNAL MEDICINE

## 2022-09-30 PROCEDURE — 1159F MED LIST DOCD IN RCRD: CPT | Mod: CPTII,S$GLB,, | Performed by: INTERNAL MEDICINE

## 2022-09-30 PROCEDURE — 1160F PR REVIEW ALL MEDS BY PRESCRIBER/CLIN PHARMACIST DOCUMENTED: ICD-10-PCS | Mod: CPTII,S$GLB,, | Performed by: INTERNAL MEDICINE

## 2022-09-30 PROCEDURE — 3079F PR MOST RECENT DIASTOLIC BLOOD PRESSURE 80-89 MM HG: ICD-10-PCS | Mod: CPTII,S$GLB,, | Performed by: INTERNAL MEDICINE

## 2022-09-30 PROCEDURE — 1160F RVW MEDS BY RX/DR IN RCRD: CPT | Mod: CPTII,S$GLB,, | Performed by: INTERNAL MEDICINE

## 2022-09-30 PROCEDURE — 99204 OFFICE O/P NEW MOD 45 MIN: CPT | Mod: S$GLB,,, | Performed by: INTERNAL MEDICINE

## 2022-09-30 PROCEDURE — 1159F PR MEDICATION LIST DOCUMENTED IN MEDICAL RECORD: ICD-10-PCS | Mod: CPTII,S$GLB,, | Performed by: INTERNAL MEDICINE

## 2022-09-30 PROCEDURE — 3074F PR MOST RECENT SYSTOLIC BLOOD PRESSURE < 130 MM HG: ICD-10-PCS | Mod: CPTII,S$GLB,, | Performed by: INTERNAL MEDICINE

## 2022-09-30 PROCEDURE — 99999 PR PBB SHADOW E&M-EST. PATIENT-LVL V: CPT | Mod: PBBFAC,,, | Performed by: INTERNAL MEDICINE

## 2022-09-30 PROCEDURE — 99204 PR OFFICE/OUTPT VISIT, NEW, LEVL IV, 45-59 MIN: ICD-10-PCS | Mod: S$GLB,,, | Performed by: INTERNAL MEDICINE

## 2022-09-30 PROCEDURE — 99999 PR PBB SHADOW E&M-EST. PATIENT-LVL V: ICD-10-PCS | Mod: PBBFAC,,, | Performed by: INTERNAL MEDICINE

## 2022-09-30 PROCEDURE — 3008F PR BODY MASS INDEX (BMI) DOCUMENTED: ICD-10-PCS | Mod: CPTII,S$GLB,, | Performed by: INTERNAL MEDICINE

## 2022-09-30 PROCEDURE — 3044F PR MOST RECENT HEMOGLOBIN A1C LEVEL <7.0%: ICD-10-PCS | Mod: CPTII,S$GLB,, | Performed by: INTERNAL MEDICINE

## 2022-09-30 PROCEDURE — 3044F HG A1C LEVEL LT 7.0%: CPT | Mod: CPTII,S$GLB,, | Performed by: INTERNAL MEDICINE

## 2022-09-30 PROCEDURE — 3008F BODY MASS INDEX DOCD: CPT | Mod: CPTII,S$GLB,, | Performed by: INTERNAL MEDICINE

## 2022-09-30 RX ORDER — SEMAGLUTIDE 0.25 MG/.5ML
0.25 INJECTION, SOLUTION SUBCUTANEOUS
Qty: 2 ML | Refills: 0 | Status: SHIPPED | OUTPATIENT
Start: 2022-09-30 | End: 2022-10-27 | Stop reason: SDUPTHER

## 2022-09-30 NOTE — PROGRESS NOTES
ENDOCRINOLOGY INITIAL VISIT  09/30/2022    Reason for Visit:  referred by Mega Argueta MD for evaluation of weight gain    HPI:  Eve Choi III is a 52 y.o. male with hx of RA (periodically treated with short courses of prednisone), hyperlipidemia, hypothyroidism, GERD who presents for evaluation of weight gain.      Regarding weight gain:    Patient reports that weight has increased gradually over time.   Baseline wt is about 210, goal is 230, now at 274    Previous wt management strategies:  Tried ideal protein, intermittent fasting, atkins, exercise    Current diet:  none    Physical Activity:  2-3 days/wk circuit training for 50 min     Denies any changes to medications recently (only occasionally getting short courses of prednisone for arthritis)  Not on any meds that cause wt gain.    Wt Readings from Last 3 Encounters:   09/30/22 124.3 kg (274 lb 0.5 oz)   01/27/22 126.4 kg (278 lb 10.6 oz)   03/25/21 117.9 kg (260 lb)        Symptoms of hypercortisolemia:     Yes  No   Easy bruising:  []   [x]    Fragility fracture: []   [x]  Only hx of fractures when younger from trauma  Violaceous Striae: []   [x]       Previous workup:  On levothyroxine 100 mcg daily- taking appropriately  Lab Results   Component Value Date    TSH 3.300 01/27/2022    FREET4 0.94 05/25/2011     Random cortisol  No results found for: CORTISOL, LABCORT  Lab Results   Component Value Date    HGBA1C 5.4 01/27/2022     Denies hx of pancreatitis or MTC    Review of patient's allergies indicates:  No Known Allergies      Current Outpatient Medications:     aspirin (ECOTRIN) 81 MG EC tablet, Take 81 mg by mouth once daily., Disp: , Rfl:     celecoxib (CELEBREX) 100 MG capsule, Take 100 mg by mouth., Disp: , Rfl:     etanercept (ENBREL MINI) 50 mg/mL (1 mL) Crtg, , Disp: , Rfl:     levothyroxine (SYNTHROID) 100 MCG tablet, Take 1 tablet (100 mcg total) by mouth once daily., Disp: 90 tablet, Rfl: 3    meloxicam (MOBIC) 15 MG tablet,  "TAKE 1 TABLET(15 MG) BY MOUTH EVERY DAY, Disp: 90 tablet, Rfl: 3    multivitamin capsule, Take 1 capsule by mouth once daily., Disp: , Rfl:     rosuvastatin (CRESTOR) 10 MG tablet, Take 1 tablet (10 mg total) by mouth every evening., Disp: 90 tablet, Rfl: 3      ROS: see HPI       PHYSICAL EXAM  /82 (BP Location: Left arm, Patient Position: Sitting, BP Method: Large (Manual))   Pulse 69   Ht 6' 1" (1.854 m)   Wt 124.3 kg (274 lb 0.5 oz)   SpO2 96%   BMI 36.15 kg/m²   Wt Readings from Last 3 Encounters:   01/27/22 126.4 kg (278 lb 10.6 oz)   03/25/21 117.9 kg (260 lb)   11/24/20 122.5 kg (270 lb)   ]    Constitutional:  Pleasant,  in no acute distress.   HENT:   Eyes:     No scleral icterus.   Respiratory:   Effort normal   Neurological:  normal speech  Psych:  Normal mood and affect.    No bruising on extremities      IMAGING STUDIES    ASSESSMENT/PLAN    Problem List Items Addressed This Visit          1 - High    Weight gain     Gradual weight gain without symptoms of hypercortisolemia and with normal TSH.  Discussed lifestyle modifications which he has tried in the past w/o success.  Will start wegovy 0.25 mg weekly x 4 weeks and if tolerating will increase gradually.      Has not been able to be seen in bariatric med clinic.              2     Hypothyroidism (acquired)     Clinically euthyroid with normal TSH, continue levothyroxine 100 mcg daily          Other Visit Diagnoses       Obesity, unspecified classification, unspecified obesity type, unspecified whether serious comorbidity present    -  Primary    Relevant Medications    semaglutide, weight loss, (WEGOVY) 0.25 mg/0.5 mL PnIj          RTC 6 month(s). Virtual albert Soto MD    "

## 2022-09-30 NOTE — PATIENT INSTRUCTIONS
Start taking wegovy 0.25 mg once weekly for 4 weeks then let me me knowif you tolerate it so we can increase to 0.5 mg once weekly     Potential Side Effects of Wegovy:    One of the ways it works is by decreasing how fast your stomach empties, which makes you feel full faster after you eat and should help you lose weight. The main side-effects are related to GI upset, such as nausea, bloating and abdominal cramps. You can usually avoid this by eating slowly (take half of your normal portion and eat it over 30 minutes). If you do experience nausea, it does tend to get better after the first few weeks. The most severe reaction is acute pancreatitis which can cause severe abdominal pain radiating to your back. If you experience this, stop the medication and go to the ER. Fortunately this is very rare.

## 2022-10-04 PROBLEM — R63.5 WEIGHT GAIN: Status: ACTIVE | Noted: 2022-10-04

## 2022-10-04 NOTE — ASSESSMENT & PLAN NOTE
Gradual weight gain without symptoms of hypercortisolemia and with normal TSH.  Discussed lifestyle modifications which he has tried in the past w/o success.  Will start wegovy 0.25 mg weekly x 4 weeks and if tolerating will increase gradually.      Has not been able to be seen in bariatric med clinic.

## 2022-10-18 DIAGNOSIS — R10.9 ABDOMINAL PAIN, UNSPECIFIED ABDOMINAL LOCATION: Primary | ICD-10-CM

## 2022-10-18 DIAGNOSIS — N20.0 NEPHROLITHIASIS: Primary | ICD-10-CM

## 2022-10-18 NOTE — PROGRESS NOTES
Pt is having flank pain and thinks he has a stone  CT renal stone study and KUB ordered stat   Will have office schedule appt to see me tomorrow with above.

## 2022-10-19 ENCOUNTER — HOSPITAL ENCOUNTER (OUTPATIENT)
Dept: RADIOLOGY | Facility: HOSPITAL | Age: 52
Discharge: HOME | End: 2022-10-19
Attending: UROLOGY
Payer: COMMERCIAL

## 2022-10-19 DIAGNOSIS — N20.0 NEPHROLITHIASIS: ICD-10-CM

## 2022-10-19 DIAGNOSIS — R10.9 ABDOMINAL PAIN, UNSPECIFIED ABDOMINAL LOCATION: ICD-10-CM

## 2022-10-19 PROCEDURE — 74018 XR KUB: ICD-10-PCS | Mod: 26,,, | Performed by: RADIOLOGY

## 2022-10-19 PROCEDURE — 74176 CT RENAL STONE STUDY ABD PELVIS WO: ICD-10-PCS | Mod: 26,,, | Performed by: RADIOLOGY

## 2022-10-19 PROCEDURE — 74176 CT ABD & PELVIS W/O CONTRAST: CPT | Mod: TC

## 2022-10-19 PROCEDURE — 74018 RADEX ABDOMEN 1 VIEW: CPT | Mod: 26,,, | Performed by: RADIOLOGY

## 2022-10-19 PROCEDURE — 74176 CT ABD & PELVIS W/O CONTRAST: CPT | Mod: 26,,, | Performed by: RADIOLOGY

## 2022-10-19 PROCEDURE — 74018 RADEX ABDOMEN 1 VIEW: CPT | Mod: TC

## 2022-10-27 ENCOUNTER — PATIENT MESSAGE (OUTPATIENT)
Dept: ENDOCRINOLOGY | Facility: CLINIC | Age: 52
End: 2022-10-27
Payer: COMMERCIAL

## 2022-10-27 DIAGNOSIS — E66.9 OBESITY, UNSPECIFIED CLASSIFICATION, UNSPECIFIED OBESITY TYPE, UNSPECIFIED WHETHER SERIOUS COMORBIDITY PRESENT: ICD-10-CM

## 2022-10-27 RX ORDER — SEMAGLUTIDE 0.25 MG/.5ML
0.25 INJECTION, SOLUTION SUBCUTANEOUS
Qty: 2 ML | Refills: 0 | Status: SHIPPED | OUTPATIENT
Start: 2022-10-27

## 2022-11-06 ENCOUNTER — OFFICE VISIT (OUTPATIENT)
Dept: URGENT CARE | Facility: CLINIC | Age: 52
End: 2022-11-06
Payer: COMMERCIAL

## 2022-11-06 VITALS
WEIGHT: 270 LBS | HEIGHT: 74 IN | TEMPERATURE: 98 F | SYSTOLIC BLOOD PRESSURE: 132 MMHG | DIASTOLIC BLOOD PRESSURE: 82 MMHG | RESPIRATION RATE: 18 BRPM | BODY MASS INDEX: 34.65 KG/M2 | OXYGEN SATURATION: 97 % | HEART RATE: 89 BPM

## 2022-11-06 DIAGNOSIS — R50.9 ACUTE FEBRILE ILLNESS: Primary | ICD-10-CM

## 2022-11-06 LAB
CTP QC/QA: YES
POC MOLECULAR INFLUENZA A AGN: NEGATIVE
POC MOLECULAR INFLUENZA B AGN: NEGATIVE

## 2022-11-06 PROCEDURE — 1159F MED LIST DOCD IN RCRD: CPT | Mod: CPTII,S$GLB,, | Performed by: NURSE PRACTITIONER

## 2022-11-06 PROCEDURE — 1160F PR REVIEW ALL MEDS BY PRESCRIBER/CLIN PHARMACIST DOCUMENTED: ICD-10-PCS | Mod: CPTII,S$GLB,, | Performed by: NURSE PRACTITIONER

## 2022-11-06 PROCEDURE — 3008F PR BODY MASS INDEX (BMI) DOCUMENTED: ICD-10-PCS | Mod: CPTII,S$GLB,, | Performed by: NURSE PRACTITIONER

## 2022-11-06 PROCEDURE — 3079F DIAST BP 80-89 MM HG: CPT | Mod: CPTII,S$GLB,, | Performed by: NURSE PRACTITIONER

## 2022-11-06 PROCEDURE — 1160F RVW MEDS BY RX/DR IN RCRD: CPT | Mod: CPTII,S$GLB,, | Performed by: NURSE PRACTITIONER

## 2022-11-06 PROCEDURE — 3008F BODY MASS INDEX DOCD: CPT | Mod: CPTII,S$GLB,, | Performed by: NURSE PRACTITIONER

## 2022-11-06 PROCEDURE — 3079F PR MOST RECENT DIASTOLIC BLOOD PRESSURE 80-89 MM HG: ICD-10-PCS | Mod: CPTII,S$GLB,, | Performed by: NURSE PRACTITIONER

## 2022-11-06 PROCEDURE — 3044F HG A1C LEVEL LT 7.0%: CPT | Mod: CPTII,S$GLB,, | Performed by: NURSE PRACTITIONER

## 2022-11-06 PROCEDURE — 3075F SYST BP GE 130 - 139MM HG: CPT | Mod: CPTII,S$GLB,, | Performed by: NURSE PRACTITIONER

## 2022-11-06 PROCEDURE — 87502 POCT INFLUENZA A/B MOLECULAR: ICD-10-PCS | Mod: QW,S$GLB,, | Performed by: NURSE PRACTITIONER

## 2022-11-06 PROCEDURE — 99214 OFFICE O/P EST MOD 30 MIN: CPT | Mod: S$GLB,,, | Performed by: NURSE PRACTITIONER

## 2022-11-06 PROCEDURE — 1159F PR MEDICATION LIST DOCUMENTED IN MEDICAL RECORD: ICD-10-PCS | Mod: CPTII,S$GLB,, | Performed by: NURSE PRACTITIONER

## 2022-11-06 PROCEDURE — 87502 INFLUENZA DNA AMP PROBE: CPT | Mod: QW,S$GLB,, | Performed by: NURSE PRACTITIONER

## 2022-11-06 PROCEDURE — 99214 PR OFFICE/OUTPT VISIT, EST, LEVL IV, 30-39 MIN: ICD-10-PCS | Mod: S$GLB,,, | Performed by: NURSE PRACTITIONER

## 2022-11-06 PROCEDURE — 3044F PR MOST RECENT HEMOGLOBIN A1C LEVEL <7.0%: ICD-10-PCS | Mod: CPTII,S$GLB,, | Performed by: NURSE PRACTITIONER

## 2022-11-06 PROCEDURE — 3075F PR MOST RECENT SYSTOLIC BLOOD PRESS GE 130-139MM HG: ICD-10-PCS | Mod: CPTII,S$GLB,, | Performed by: NURSE PRACTITIONER

## 2022-11-06 NOTE — PROGRESS NOTES
"Subjective:       Patient ID: Eve Choi III is a 52 y.o. male.    Vitals:  height is 6' 2" (1.88 m) and weight is 122.5 kg (270 lb). His temperature is 97.9 °F (36.6 °C). His blood pressure is 132/82 and his pulse is 89. His respiration is 18 and oxygen saturation is 97%.     Chief Complaint: Fever (Flu? - Entered by patient)    Patient reports fatigue, body aches, and fever since yesterday. Patient reports taking tylenol and was able to break his fever. He is feeling a little better today but still reports fatigue and body aches. No known exposure to flu    Fever   This is a new problem. The current episode started yesterday. The problem occurs constantly. The problem has been unchanged. The maximum temperature noted was 100 to 100.9 F. The temperature was taken using an oral thermometer. Associated symptoms include muscle aches. Pertinent negatives include no headaches or sore throat. He has tried acetaminophen for the symptoms. The treatment provided moderate relief.     Constitution: Positive for fever.   HENT:  Negative for sore throat.    Neurological:  Negative for headaches.     Objective:      Physical Exam   Constitutional: He is oriented to person, place, and time. He appears well-developed. He is cooperative.  Non-toxic appearance. He does not appear ill. No distress.   HENT:   Head: Normocephalic and atraumatic.   Ears:   Right Ear: Hearing and external ear normal.   Left Ear: Hearing and external ear normal.   Nose: Nose normal. No mucosal edema, rhinorrhea or nasal deformity. No epistaxis.   Mouth/Throat: Mucous membranes are normal.   Eyes: Conjunctivae and lids are normal. No scleral icterus.   Neck: Trachea normal and phonation normal. Neck supple. No edema present. No erythema present. No neck rigidity present.   Cardiovascular: Normal rate, regular rhythm, normal heart sounds and normal pulses.   Pulmonary/Chest: Effort normal and breath sounds normal. No respiratory distress. He has no " decreased breath sounds. He has no rhonchi.   Abdominal: Normal appearance.   Musculoskeletal: Normal range of motion.         General: No deformity. Normal range of motion.   Neurological: He is alert and oriented to person, place, and time. He exhibits normal muscle tone. Coordination normal.   Skin: Skin is warm, dry, intact, not diaphoretic and not pale.   Psychiatric: His speech is normal and behavior is normal. Judgment and thought content normal.   Nursing note and vitals reviewed.      Results for orders placed or performed in visit on 11/06/22   POCT Influenza A/B MOLECULAR   Result Value Ref Range    POC Molecular Influenza A Ag Negative Negative, Not Reported    POC Molecular Influenza B Ag Negative Negative, Not Reported     Acceptable Yes        Assessment:       1. Acute febrile illness          Plan:         Acute febrile illness  -     POCT Influenza A/B MOLECULAR               Patient Instructions   Motrin or Tylenol as needed for fever

## 2022-12-05 ENCOUNTER — PATIENT MESSAGE (OUTPATIENT)
Dept: INTERNAL MEDICINE | Facility: CLINIC | Age: 52
End: 2022-12-05
Payer: COMMERCIAL

## 2023-01-04 DIAGNOSIS — Z00.00 ROUTINE GENERAL MEDICAL EXAMINATION AT A HEALTH CARE FACILITY: Primary | ICD-10-CM

## 2023-01-05 ENCOUNTER — PATIENT MESSAGE (OUTPATIENT)
Dept: INTERNAL MEDICINE | Facility: CLINIC | Age: 53
End: 2023-01-05
Payer: COMMERCIAL

## 2023-01-05 RX ORDER — BENZONATATE 200 MG/1
200 CAPSULE ORAL 3 TIMES DAILY PRN
Qty: 45 CAPSULE | Refills: 0 | Status: SHIPPED | OUTPATIENT
Start: 2023-01-05 | End: 2023-02-01

## 2023-01-23 ENCOUNTER — LAB VISIT (OUTPATIENT)
Dept: LAB | Facility: HOSPITAL | Age: 53
End: 2023-01-23
Attending: FAMILY MEDICINE
Payer: COMMERCIAL

## 2023-01-23 ENCOUNTER — OFFICE VISIT (OUTPATIENT)
Dept: INTERNAL MEDICINE | Facility: CLINIC | Age: 53
End: 2023-01-23
Attending: FAMILY MEDICINE
Payer: COMMERCIAL

## 2023-01-23 ENCOUNTER — CLINICAL SUPPORT (OUTPATIENT)
Dept: INTERNAL MEDICINE | Facility: CLINIC | Age: 53
End: 2023-01-23
Payer: COMMERCIAL

## 2023-01-23 ENCOUNTER — HOSPITAL ENCOUNTER (OUTPATIENT)
Dept: CARDIOLOGY | Facility: CLINIC | Age: 53
Discharge: HOME | End: 2023-01-23
Attending: FAMILY MEDICINE
Payer: COMMERCIAL

## 2023-01-23 ENCOUNTER — PATIENT MESSAGE (OUTPATIENT)
Dept: INTERNAL MEDICINE | Facility: CLINIC | Age: 53
End: 2023-01-23

## 2023-01-23 VITALS
HEIGHT: 74 IN | WEIGHT: 269 LBS | BODY MASS INDEX: 34.52 KG/M2 | SYSTOLIC BLOOD PRESSURE: 120 MMHG | DIASTOLIC BLOOD PRESSURE: 82 MMHG

## 2023-01-23 DIAGNOSIS — Z00.00 ANNUAL PHYSICAL EXAM: Primary | ICD-10-CM

## 2023-01-23 DIAGNOSIS — R79.89 ABNORMAL COMPLETE BLOOD COUNT: ICD-10-CM

## 2023-01-23 DIAGNOSIS — E03.9 HYPOTHYROIDISM (ACQUIRED): ICD-10-CM

## 2023-01-23 DIAGNOSIS — I70.0 AORTIC ATHEROSCLEROSIS: ICD-10-CM

## 2023-01-23 DIAGNOSIS — E78.00 HYPERCHOLESTEROLEMIA: ICD-10-CM

## 2023-01-23 DIAGNOSIS — Z00.00 ROUTINE GENERAL MEDICAL EXAMINATION AT A HEALTH CARE FACILITY: ICD-10-CM

## 2023-01-23 DIAGNOSIS — M13.80 SERONEGATIVE ARTHRITIS: ICD-10-CM

## 2023-01-23 DIAGNOSIS — G47.00 INSOMNIA, UNSPECIFIED TYPE: ICD-10-CM

## 2023-01-23 DIAGNOSIS — E05.90 HYPERTHYROIDISM: ICD-10-CM

## 2023-01-23 DIAGNOSIS — E78.5 HYPERLIPIDEMIA, UNSPECIFIED HYPERLIPIDEMIA TYPE: ICD-10-CM

## 2023-01-23 LAB
ALBUMIN SERPL BCP-MCNC: 3.5 G/DL (ref 3.5–5.2)
ALP SERPL-CCNC: 55 U/L (ref 55–135)
ALT SERPL W/O P-5'-P-CCNC: 20 U/L (ref 10–44)
ANION GAP SERPL CALC-SCNC: 8 MMOL/L (ref 8–16)
AST SERPL-CCNC: 22 U/L (ref 10–40)
BILIRUB SERPL-MCNC: 0.5 MG/DL (ref 0.1–1)
BUN SERPL-MCNC: 18 MG/DL (ref 6–20)
CALCIUM SERPL-MCNC: 9.1 MG/DL (ref 8.7–10.5)
CHLORIDE SERPL-SCNC: 107 MMOL/L (ref 95–110)
CHOLEST SERPL-MCNC: 148 MG/DL (ref 120–199)
CHOLEST/HDLC SERPL: 3.4 {RATIO} (ref 2–5)
CO2 SERPL-SCNC: 26 MMOL/L (ref 23–29)
COMPLEXED PSA SERPL-MCNC: 0.32 NG/ML (ref 0–4)
CREAT SERPL-MCNC: 1.2 MG/DL (ref 0.5–1.4)
ERYTHROCYTE [DISTWIDTH] IN BLOOD BY AUTOMATED COUNT: 12.3 % (ref 11.5–14.5)
EST. GFR  (NO RACE VARIABLE): >60 ML/MIN/1.73 M^2
ESTIMATED AVG GLUCOSE: 108 MG/DL (ref 68–131)
FERRITIN SERPL-MCNC: 296 NG/ML (ref 20–300)
GLUCOSE SERPL-MCNC: 102 MG/DL (ref 70–110)
HBA1C MFR BLD: 5.4 % (ref 4–5.6)
HCT VFR BLD AUTO: 39.4 % (ref 40–54)
HDLC SERPL-MCNC: 44 MG/DL (ref 40–75)
HDLC SERPL: 29.7 % (ref 20–50)
HGB BLD-MCNC: 13.4 G/DL (ref 14–18)
IRON SERPL-MCNC: 87 UG/DL (ref 45–160)
LDLC SERPL CALC-MCNC: 65.4 MG/DL (ref 63–159)
MCH RBC QN AUTO: 30.6 PG (ref 27–31)
MCHC RBC AUTO-ENTMCNC: 34 G/DL (ref 32–36)
MCV RBC AUTO: 90 FL (ref 82–98)
NONHDLC SERPL-MCNC: 104 MG/DL
PLATELET # BLD AUTO: 320 K/UL (ref 150–450)
PMV BLD AUTO: 9.3 FL (ref 9.2–12.9)
POTASSIUM SERPL-SCNC: 4.7 MMOL/L (ref 3.5–5.1)
PROT SERPL-MCNC: 6.6 G/DL (ref 6–8.4)
RBC # BLD AUTO: 4.38 M/UL (ref 4.6–6.2)
SODIUM SERPL-SCNC: 141 MMOL/L (ref 136–145)
THYROGLOB AB SERPL IA-ACNC: 52.5 IU/ML (ref 0–3.9)
THYROPEROXIDASE IGG SERPL-ACNC: <6 IU/ML
TRIGL SERPL-MCNC: 193 MG/DL (ref 30–150)
TSH SERPL DL<=0.005 MIU/L-ACNC: 2.63 UIU/ML (ref 0.4–4)
WBC # BLD AUTO: 11.66 K/UL (ref 3.9–12.7)

## 2023-01-23 PROCEDURE — 99999 PR PBB SHADOW E&M-EST. PATIENT-LVL IV: ICD-10-PCS | Mod: PBBFAC,,, | Performed by: FAMILY MEDICINE

## 2023-01-23 PROCEDURE — 3008F PR BODY MASS INDEX (BMI) DOCUMENTED: ICD-10-PCS | Mod: CPTII,S$GLB,, | Performed by: FAMILY MEDICINE

## 2023-01-23 PROCEDURE — 3044F PR MOST RECENT HEMOGLOBIN A1C LEVEL <7.0%: ICD-10-PCS | Mod: CPTII,S$GLB,, | Performed by: FAMILY MEDICINE

## 2023-01-23 PROCEDURE — 84443 ASSAY THYROID STIM HORMONE: CPT | Performed by: FAMILY MEDICINE

## 2023-01-23 PROCEDURE — 80053 COMPREHEN METABOLIC PANEL: CPT | Performed by: FAMILY MEDICINE

## 2023-01-23 PROCEDURE — 99999 PR PBB SHADOW E&M-EST. PATIENT-LVL I: CPT | Mod: PBBFAC,,,

## 2023-01-23 PROCEDURE — 1160F RVW MEDS BY RX/DR IN RCRD: CPT | Mod: CPTII,S$GLB,, | Performed by: FAMILY MEDICINE

## 2023-01-23 PROCEDURE — 93010 EKG 12-LEAD: ICD-10-PCS | Mod: S$GLB,,, | Performed by: INTERNAL MEDICINE

## 2023-01-23 PROCEDURE — 84153 ASSAY OF PSA TOTAL: CPT | Performed by: FAMILY MEDICINE

## 2023-01-23 PROCEDURE — 1159F MED LIST DOCD IN RCRD: CPT | Mod: CPTII,S$GLB,, | Performed by: FAMILY MEDICINE

## 2023-01-23 PROCEDURE — 85027 COMPLETE CBC AUTOMATED: CPT | Performed by: FAMILY MEDICINE

## 2023-01-23 PROCEDURE — 3079F PR MOST RECENT DIASTOLIC BLOOD PRESSURE 80-89 MM HG: ICD-10-PCS | Mod: CPTII,S$GLB,, | Performed by: FAMILY MEDICINE

## 2023-01-23 PROCEDURE — 99396 PR PREVENTIVE VISIT,EST,40-64: ICD-10-PCS | Mod: S$GLB,,, | Performed by: FAMILY MEDICINE

## 2023-01-23 PROCEDURE — 99999 PR PBB SHADOW E&M-EST. PATIENT-LVL IV: CPT | Mod: PBBFAC,,, | Performed by: FAMILY MEDICINE

## 2023-01-23 PROCEDURE — 3079F DIAST BP 80-89 MM HG: CPT | Mod: CPTII,S$GLB,, | Performed by: FAMILY MEDICINE

## 2023-01-23 PROCEDURE — 83036 HEMOGLOBIN GLYCOSYLATED A1C: CPT | Performed by: FAMILY MEDICINE

## 2023-01-23 PROCEDURE — 80061 LIPID PANEL: CPT | Performed by: FAMILY MEDICINE

## 2023-01-23 PROCEDURE — 93005 EKG 12-LEAD: ICD-10-PCS | Mod: S$GLB,,, | Performed by: FAMILY MEDICINE

## 2023-01-23 PROCEDURE — 3074F SYST BP LT 130 MM HG: CPT | Mod: CPTII,S$GLB,, | Performed by: FAMILY MEDICINE

## 2023-01-23 PROCEDURE — 99999 PR PBB SHADOW E&M-EST. PATIENT-LVL I: ICD-10-PCS | Mod: PBBFAC,,,

## 2023-01-23 PROCEDURE — 3044F HG A1C LEVEL LT 7.0%: CPT | Mod: CPTII,S$GLB,, | Performed by: FAMILY MEDICINE

## 2023-01-23 PROCEDURE — 36415 COLL VENOUS BLD VENIPUNCTURE: CPT | Performed by: FAMILY MEDICINE

## 2023-01-23 PROCEDURE — 1159F PR MEDICATION LIST DOCUMENTED IN MEDICAL RECORD: ICD-10-PCS | Mod: CPTII,S$GLB,, | Performed by: FAMILY MEDICINE

## 2023-01-23 PROCEDURE — 82728 ASSAY OF FERRITIN: CPT | Performed by: FAMILY MEDICINE

## 2023-01-23 PROCEDURE — 93010 ELECTROCARDIOGRAM REPORT: CPT | Mod: S$GLB,,, | Performed by: INTERNAL MEDICINE

## 2023-01-23 PROCEDURE — 83540 ASSAY OF IRON: CPT | Performed by: FAMILY MEDICINE

## 2023-01-23 PROCEDURE — 86376 MICROSOMAL ANTIBODY EACH: CPT | Performed by: FAMILY MEDICINE

## 2023-01-23 PROCEDURE — 3008F BODY MASS INDEX DOCD: CPT | Mod: CPTII,S$GLB,, | Performed by: FAMILY MEDICINE

## 2023-01-23 PROCEDURE — 3074F PR MOST RECENT SYSTOLIC BLOOD PRESSURE < 130 MM HG: ICD-10-PCS | Mod: CPTII,S$GLB,, | Performed by: FAMILY MEDICINE

## 2023-01-23 PROCEDURE — 86800 THYROGLOBULIN ANTIBODY: CPT | Performed by: FAMILY MEDICINE

## 2023-01-23 PROCEDURE — 1160F PR REVIEW ALL MEDS BY PRESCRIBER/CLIN PHARMACIST DOCUMENTED: ICD-10-PCS | Mod: CPTII,S$GLB,, | Performed by: FAMILY MEDICINE

## 2023-01-23 PROCEDURE — 99396 PREV VISIT EST AGE 40-64: CPT | Mod: S$GLB,,, | Performed by: FAMILY MEDICINE

## 2023-01-23 PROCEDURE — 93005 ELECTROCARDIOGRAM TRACING: CPT | Mod: S$GLB,,, | Performed by: FAMILY MEDICINE

## 2023-01-23 RX ORDER — ROSUVASTATIN CALCIUM 10 MG/1
10 TABLET, COATED ORAL NIGHTLY
Qty: 90 TABLET | Refills: 3 | Status: SHIPPED | OUTPATIENT
Start: 2023-01-23 | End: 2024-01-23 | Stop reason: SDUPTHER

## 2023-01-23 RX ORDER — ZOLPIDEM TARTRATE 6.25 MG/1
6.25 TABLET, FILM COATED, EXTENDED RELEASE ORAL NIGHTLY PRN
Qty: 30 TABLET | Refills: 0 | Status: SHIPPED | OUTPATIENT
Start: 2023-01-23 | End: 2023-02-01 | Stop reason: SDUPTHER

## 2023-01-23 RX ORDER — LEVOTHYROXINE SODIUM 100 UG/1
100 TABLET ORAL DAILY
Qty: 90 TABLET | Refills: 3 | Status: SHIPPED | OUTPATIENT
Start: 2023-01-23 | End: 2024-01-23 | Stop reason: SDUPTHER

## 2023-01-23 NOTE — PROGRESS NOTES
Subjective:       Patient ID: Eve Choi III is a 52 y.o. male.    Chief Complaint: Executive Health    Established patient follows up for management of chronic medical illnesses with complaints today. Please see dictation and ROS for interval problems, specific complaints and disease management discussion.    Past, Surgical, Family, Social, Histories; Medications, allergies reviewed and reconciled.  Health maintenance file reviewed and addressed items due. Recent applicable lab, imaging and cardiovascular results reviewed.  Problem list items reviewed and modified or added entries (in the overview section) may not be transcribed into this encounter note due to note writer format.      Weight gain seen by Dr. Soto.    Review of Systems   Constitutional:  Positive for unexpected weight change. Negative for appetite change, chills, diaphoresis, fatigue and fever.   HENT:  Negative for congestion, postnasal drip, rhinorrhea, sore throat and trouble swallowing.    Eyes:  Negative for visual disturbance.   Respiratory:  Negative for cough, choking, chest tightness, shortness of breath and wheezing.    Cardiovascular:  Negative for chest pain and leg swelling.   Gastrointestinal:  Negative for abdominal distention, abdominal pain, diarrhea, nausea and vomiting.   Genitourinary:  Negative for difficulty urinating and hematuria.   Musculoskeletal:  Positive for arthralgias. Negative for myalgias.   Skin:  Negative for rash.   Neurological:  Negative for weakness, light-headedness and headaches.   Psychiatric/Behavioral:  Positive for sleep disturbance. Negative for confusion and dysphoric mood.      Objective:      Physical Exam  Vitals and nursing note reviewed.   Constitutional:       Appearance: He is well-developed. He is not diaphoretic.   Eyes:      General: No scleral icterus.  Neck:      Thyroid: No thyromegaly.      Vascular: No carotid bruit or JVD.      Trachea: No tracheal deviation.   Cardiovascular:       Rate and Rhythm: Normal rate and regular rhythm.      Heart sounds: Heart sounds are distant. No murmur heard.    No friction rub. No gallop.   Pulmonary:      Effort: Pulmonary effort is normal. No respiratory distress.      Breath sounds: Normal breath sounds. No wheezing or rales.   Abdominal:      General: There is no distension.      Palpations: Abdomen is soft. There is no mass.      Tenderness: There is no abdominal tenderness. There is no guarding or rebound.   Musculoskeletal:      Cervical back: Normal range of motion and neck supple.   Lymphadenopathy:      Cervical: No cervical adenopathy.   Skin:     General: Skin is warm and dry.      Findings: No erythema or rash.   Neurological:      Mental Status: He is alert and oriented to person, place, and time.      Cranial Nerves: No cranial nerve deficit.      Motor: No tremor.      Coordination: Coordination normal.      Gait: Gait normal.   Psychiatric:         Behavior: Behavior normal.         Thought Content: Thought content normal.         Judgment: Judgment normal.       Assessment:       1. Annual physical exam    2. Seronegative arthritis    3. Aortic atherosclerosis    4. Hyperlipidemia, unspecified hyperlipidemia type    5. Hypothyroidism (acquired)    6. Abnormal complete blood count    7. Hyperthyroidism    8. Hypercholesterolemia    9. Insomnia, unspecified type          Plan:     Medication List with Changes/Refills   New Medications    ZOLPIDEM (AMBIEN CR) 6.25 MG CR TABLET    Take 1 tablet (6.25 mg total) by mouth nightly as needed for Insomnia.   Current Medications    ASPIRIN (ECOTRIN) 81 MG EC TABLET    Take 81 mg by mouth once daily.    BENZONATATE (TESSALON) 200 MG CAPSULE    Take 1 capsule (200 mg total) by mouth 3 (three) times daily as needed for Cough.    CELECOXIB (CELEBREX) 100 MG CAPSULE    Take 100 mg by mouth.    MELOXICAM (MOBIC) 15 MG TABLET    TAKE 1 TABLET(15 MG) BY MOUTH EVERY DAY    MULTIVITAMIN CAPSULE    Take 1 capsule  by mouth once daily.    SEMAGLUTIDE, WEIGHT LOSS, (WEGOVY) 0.25 MG/0.5 ML PNIJ    Inject 0.25 mg into the skin every 7 days.   Changed and/or Refilled Medications    Modified Medication Previous Medication    LEVOTHYROXINE (SYNTHROID) 100 MCG TABLET levothyroxine (SYNTHROID) 100 MCG tablet       Take 1 tablet (100 mcg total) by mouth once daily.    Take 1 tablet (100 mcg total) by mouth once daily.    ROSUVASTATIN (CRESTOR) 10 MG TABLET rosuvastatin (CRESTOR) 10 MG tablet       Take 1 tablet (10 mg total) by mouth every evening.    Take 1 tablet (10 mg total) by mouth every evening.   Discontinued Medications    ETANERCEPT (ENBREL MINI) 50 MG/ML (1 ML) CRTG         1. Annual physical exam    2. Seronegative arthritis  Overview:  -followed by Dr. Fernandes locally, has been evaluated @ [facility] - AS, psoriatic arthritis      3. Aortic atherosclerosis  Overview:  -incidental on 10/19/2022 CT renal stone - 'Mild scattered calcific atherosclerosis of the abdominal aorta which otherwise tapers normally'    Orders:  -     Ambulatory referral/consult to Cardiology; Future; Expected date: 01/30/2023    4. Hyperlipidemia, unspecified hyperlipidemia type  -     Ambulatory referral/consult to Cardiology; Future; Expected date: 01/30/2023    5. Hypothyroidism (acquired)  Overview:  -diagnosed many years ago, treatment initiation data not available  -seen by endocrinologist 10/4/2022    Orders:  -     Anti-Thyroglobulin Antibody; Future; Expected date: 01/23/2023  -     Thyroid Peroxidase Antibody; Future; Expected date: 01/23/2023    6. Abnormal complete blood count  -     Iron; Future; Expected date: 01/23/2023  -     Ferritin; Future; Expected date: 01/23/2023    7. Hyperthyroidism  -     levothyroxine (SYNTHROID) 100 MCG tablet; Take 1 tablet (100 mcg total) by mouth once daily.  Dispense: 90 tablet; Refill: 3    8. Hypercholesterolemia  -     rosuvastatin (CRESTOR) 10 MG tablet; Take 1 tablet (10 mg total) by mouth every  evening.  Dispense: 90 tablet; Refill: 3    9. Insomnia, unspecified type  Overview:  -occasional, states uses ambien cr twice a month    Orders:  -     zolpidem (AMBIEN CR) 6.25 MG CR tablet; Take 1 tablet (6.25 mg total) by mouth nightly as needed for Insomnia.  Dispense: 30 tablet; Refill: 0      See meds, orders, follow up, routing and instructions sections of encounter and AVS. Discussed with patient and provided on AVS.    Discussed diet and exercise and links provided on AVS for detailed information.  Lab Results   Component Value Date     01/23/2023    K 4.7 01/23/2023     01/23/2023    BUN 18 01/23/2023    CREATININE 1.2 01/23/2023     01/23/2023    HGBA1C 5.4 01/23/2023    AST 22 01/23/2023    ALT 20 01/23/2023    ALBUMIN 3.5 01/23/2023    PROT 6.6 01/23/2023    BILITOT 0.5 01/23/2023    CHOL 148 01/23/2023    HDL 44 01/23/2023    LDLCALC 65.4 01/23/2023    TRIG 193 (H) 01/23/2023    WBC 11.66 01/23/2023    HGB 13.4 (L) 01/23/2023    HCT 39.4 (L) 01/23/2023     01/23/2023    PSA 0.32 01/23/2023    TSH 2.634 01/23/2023    URICACID 6.2 01/11/2018

## 2023-01-27 DIAGNOSIS — G47.00 INSOMNIA, UNSPECIFIED TYPE: ICD-10-CM

## 2023-01-27 RX ORDER — ZOLPIDEM TARTRATE 6.25 MG/1
6.25 TABLET, FILM COATED, EXTENDED RELEASE ORAL NIGHTLY PRN
Qty: 30 TABLET | Refills: 0 | Status: CANCELLED | OUTPATIENT
Start: 2023-01-27

## 2023-01-27 NOTE — TELEPHONE ENCOUNTER
No new care gaps identified.  [facility] Embedded Care Gaps. Reference number: [id number]. 1/27/2023   4:39:45 PM CST

## 2023-02-01 ENCOUNTER — PATIENT MESSAGE (OUTPATIENT)
Dept: INTERNAL MEDICINE | Facility: CLINIC | Age: 53
End: 2023-02-01
Payer: COMMERCIAL

## 2023-02-01 DIAGNOSIS — G47.00 INSOMNIA, UNSPECIFIED TYPE: ICD-10-CM

## 2023-02-01 RX ORDER — ZOLPIDEM TARTRATE 6.25 MG/1
6.25 TABLET, FILM COATED, EXTENDED RELEASE ORAL NIGHTLY PRN
Qty: 30 TABLET | Refills: 0 | Status: SHIPPED | OUTPATIENT
Start: 2023-02-01 | End: 2024-01-23 | Stop reason: SDUPTHER

## 2023-02-01 NOTE — TELEPHONE ENCOUNTER
No new care gaps identified.  [facility] Embedded Care Gaps. Reference number: [id number]. 2/01/2023   4:27:07 PM CST

## 2023-02-01 NOTE — TELEPHONE ENCOUNTER
Refill Routing Note   Medication(s) are not appropriate for processing by [facility] for the following reason(s):       Med OP (non-delegated to [facility])    [facility] action(s):  Route                          Appointments  past 12m or future 3m with PCP    Date Provider   Last Visit   1/23/2023 Mega Argueta MD   Next Visit   Visit date not found Mega Argueta MD   ED visits in past 90 days: 0        Note composed:4:31 PM 02/01/2023

## 2023-05-18 ENCOUNTER — PATIENT MESSAGE (OUTPATIENT)
Dept: INTERNAL MEDICINE | Facility: CLINIC | Age: 53
End: 2023-05-18
Payer: COMMERCIAL

## 2023-10-25 DIAGNOSIS — Z00.00 ROUTINE MEDICAL EXAM: Primary | ICD-10-CM

## 2024-01-14 ENCOUNTER — OFFICE VISIT (OUTPATIENT)
Dept: URGENT CARE | Facility: CLINIC | Age: 54
End: 2024-01-14
Payer: COMMERCIAL

## 2024-01-14 VITALS
HEART RATE: 61 BPM | HEIGHT: 74 IN | OXYGEN SATURATION: 98 % | DIASTOLIC BLOOD PRESSURE: 82 MMHG | WEIGHT: 269 LBS | SYSTOLIC BLOOD PRESSURE: 126 MMHG | RESPIRATION RATE: 20 BRPM | BODY MASS INDEX: 34.52 KG/M2 | TEMPERATURE: 98 F

## 2024-01-14 DIAGNOSIS — J06.9 VIRAL URI WITH COUGH: Primary | ICD-10-CM

## 2024-01-14 DIAGNOSIS — J02.9 SORE THROAT: ICD-10-CM

## 2024-01-14 LAB
CTP QC/QA: YES
CTP QC/QA: YES
MOLECULAR STREP A: NEGATIVE
POC MOLECULAR INFLUENZA A AGN: NEGATIVE
POC MOLECULAR INFLUENZA B AGN: NEGATIVE

## 2024-01-14 PROCEDURE — 87502 INFLUENZA DNA AMP PROBE: CPT | Mod: QW,S$GLB,, | Performed by: NURSE PRACTITIONER

## 2024-01-14 PROCEDURE — 99213 OFFICE O/P EST LOW 20 MIN: CPT | Mod: S$GLB,,, | Performed by: NURSE PRACTITIONER

## 2024-01-14 PROCEDURE — 87651 STREP A DNA AMP PROBE: CPT | Mod: QW,S$GLB,, | Performed by: NURSE PRACTITIONER

## 2024-01-14 RX ORDER — FLUTICASONE PROPIONATE 50 MCG
1 SPRAY, SUSPENSION (ML) NASAL DAILY
Qty: 18.2 ML | Refills: 0 | Status: SHIPPED | OUTPATIENT
Start: 2024-01-14 | End: 2024-01-23

## 2024-01-14 RX ORDER — BENZONATATE 200 MG/1
200 CAPSULE ORAL 3 TIMES DAILY PRN
Qty: 30 CAPSULE | Refills: 0 | Status: SHIPPED | OUTPATIENT
Start: 2024-01-14 | End: 2024-01-23

## 2024-01-14 RX ORDER — LORATADINE 10 MG/1
10 TABLET ORAL DAILY
Qty: 7 TABLET | Refills: 0 | Status: SHIPPED | OUTPATIENT
Start: 2024-01-14 | End: 2024-01-23

## 2024-01-14 RX ORDER — CYCLOBENZAPRINE HCL 10 MG
10 TABLET ORAL NIGHTLY
COMMUNITY
Start: 2023-08-02 | End: 2024-01-23

## 2024-01-14 RX ORDER — PREDNISONE 20 MG/1
20 TABLET ORAL DAILY
Qty: 3 TABLET | Refills: 0 | Status: SHIPPED | OUTPATIENT
Start: 2024-01-14 | End: 2024-01-23

## 2024-01-14 NOTE — PATIENT INSTRUCTIONS
A cold is caused by a virus that can settle in your nose, throat or lungs. This causesa runny or stuffy nose and sneezing. You may also have a sore throat, cough, headache, fever and muscle aches. Different cold viruses last different lengthsof time, but the average time is 2 to 14 days.    Seek immediate medical care if you develop fever, chest pain, or shortness of breath.     Treatment: There is no cure for the common cold, there is only symptomatic care.     Antibiotics may be used to treat signs of a secondary infection, but they do not treat  the cold virus. Try these tips to keep yourself comfortable:                   -Get plenty of rest.                   -Drink plenty of fluids, at least 8 large glasses of fluid a day. Good fluid choices are water, fruit juices high in Vitamin C, tea, gelatin, or broths and soups. These help to keep mucus thin and ease congestion.                  -Use salt water gargle, cough drops or throat sprays to relieve throat pain. Mi ¼ to ½ teaspoon of salt in 1 cup of warm water for a salt water gargle  solution.                  -Use petroleum jelly or lip balm around lips and nose to prevent chapping.                  -Use saline nose drops or spray to help ease congestion.    Over the Counter (OTC) Medicines:  Take over the counter medicines as needed to ease your signs.  Read labels carefully.  Use a product that treats only the signs that you have. Ask your pharmacist  for recommendations. Be sure to ask about possible interactions with other  medicines you are taking.  Common medicines used to treat signs of a cold include:    #Antihistamines that dry secretions in your nose and lungs. Some of these  may cause you to feel drowsy. Talk to your pharmacist before use if you  have glaucoma or an enlarged prostate.  Names of some medicines in this group include:  - Diphenhydramine  - Brompheniramine  - Chlorpheniramine  - Clemastine    # Decongestants that tighten blood vessels  in your nose to decrease  stuffiness and pressure. Use nasal spray decongestants for up to three days  only. Longer use can make congestion worse. Talk to your pharmacist  before use if you have high blood pressure, heart disease, diabetes or an  enlarged prostate.    Names of some medicines in this group include:  - Pseudoephedrine (Sudafed)- kept behind the counter and requires identification  to purchase in limited quantities because it can be used to make illegal  drugs  - Phenylephrine  - Oxymetazoline nasal spray (Afrin)  - Cough suppressant, also called antitussive, such as dextromethorphan.  This medicine decreases your reflex and sensitivity to cough. This  medicine may be kept behind the pharmacy counter for purchase.  - Expectorant, sometimes called mucolytic, such as guaifenesin (mucinex). This  medicine thins mucus secretions in the lungs to make it easier for you to  cough up and out. (Be sure to drink plenty of fluids when taking this medication)  Cold and cough medicines often contain more than one type of medicine.  Ask the pharmacist for help to confirm that you are not using more than one  product with the same or similar ingredient. For example, some cold and  cough medicines have acetaminophen or ibuprofen in them to help lower a  fever or ease muscle aches. Do not take extra acetaminophen (Tylenol) or  ibuprofen (Advil, Motrin) if the cold or cough medicine has it as an  ingredient. Too much medicine could be harmful.    Take the correct dose as listed on the package. Do not take more than  recommended.    Use a Humidifier:  A cool mist humidifier can make breathing easier by thinning mucus. Do not use  a steam humidifier as hot water can cause burns if spilled.  Place the humidifier a few feet from the bed. Drain and clean each day with  soap and water to prevent bacteria and mold from growing.  Indoor humidity should not be above 50%. Stop using the humidifier if you  notice moisture on  windows, walls or pictures.  You do not need to add any medicine to the humidifier.  If you cannot get a humidifier, place a pan of water next to heating vents and  refill the water level daily. The water will evaporate and add moisture to the  Room.    How to prevent the spread of colds  -Wash your hands with soap and water or use alcohol based hand   often. Dry hands wet from washing with soap on a paper towel instead of cloth towel.  -Cough or sneeze into your elbow to avoid spreading germs.  -Wipe down common surfaces, such as door knobs and faucet handles, with a disinfectant spray.  -Do not share cups or utensils.        -Flonase daily.  -Claritin or Zyrtec daily.  -Tessalon perles as needed for coughing.  -3 days of steroids.    Please follow up with your Primary care provider within 2-5 days if your signs and symptoms have not resolved or worsen.      If your condition worsens or fails to improve we recommend that you receive another evaluation at the emergency room immediately or contact your primary medical clinic to discuss your concerns.   You must understand that you have received an Urgent Care treatment only and that you may be released before all of your medical problems are known or treated. You, the patient, will arrange for follow up care as instructed.

## 2024-01-14 NOTE — PROGRESS NOTES
"Subjective:      Patient ID: Eve Choi III is a 53 y.o. male.    Vitals:  height is 6' 2" (1.88 m) and weight is 122 kg (269 lb). His temperature is 98.1 °F (36.7 °C). His blood pressure is 126/82 and his pulse is 61. His respiration is 20 and oxygen saturation is 98%.     Chief Complaint: Sore Throat (/)    Pt presents with complaint of sore throat, cough, congestion x3 days.  Pt states his symptoms were mild the last few days but worsened as of this morning.  Pt states he has used throat lozenges for his symptoms.  Pt states his family was positive for covid during tanya time and thinks he had it as well.    Provider note begins below    Patient reports runny nose, cough, sore throat, and headaches.  Patient states he feels like he may have had COVID but did not test for COVID when his daughter had it.  States some of his friends had strep recently.    Sore Throat   This is a new problem. The current episode started in the past 7 days. The problem has been gradually worsening. Neither side of throat is experiencing more pain than the other. There has been no fever. The pain is at a severity of 4/10. The pain is mild. Associated symptoms include congestion, coughing and headaches. Pertinent negatives include no diarrhea, shortness of breath, stridor or vomiting. He has had no exposure to strep. Treatments tried: throat lozenges. The treatment provided mild relief.       Constitution: Negative for sweating, fatigue and fever.   HENT:  Positive for congestion and sore throat.    Cardiovascular:  Negative for chest pain and sob on exertion.   Respiratory:  Positive for cough and sputum production. Negative for shortness of breath, stridor, wheezing and asthma.    Gastrointestinal:  Negative for nausea, vomiting, constipation and diarrhea.   Allergic/Immunologic: Negative for asthma.   Neurological:  Positive for headaches.      Objective:     Physical Exam   Constitutional: He is oriented to person, place, " and time.   HENT:   Head: Normocephalic and atraumatic.   Ears:   Right Ear: Tympanic membrane, external ear and ear canal normal. impacted cerumen  Left Ear: Tympanic membrane, external ear and ear canal normal. impacted cerumen  Nose: Rhinorrhea present. No congestion.   Mouth/Throat: No oropharyngeal exudate or posterior oropharyngeal erythema.   Cardiovascular: Normal rate, regular rhythm and normal heart sounds.   Pulmonary/Chest: Effort normal and breath sounds normal. No stridor. No respiratory distress. He has no wheezes. He has no rhonchi. He has no rales. He exhibits no tenderness.   Abdominal: Normal appearance.   Neurological: He is alert and oriented to person, place, and time.   Skin: Skin is dry.   Psychiatric: His behavior is normal. Mood normal.         Results for orders placed or performed in visit on 01/14/24   POCT Influenza A/B MOLECULAR   Result Value Ref Range    POC Molecular Influenza A Ag Negative Negative, Not Reported    POC Molecular Influenza B Ag Negative Negative, Not Reported     Acceptable Yes    POCT Strep A, Molecular   Result Value Ref Range    Molecular Strep A, POC Negative Negative     Acceptable Yes       Assessment:     1. Viral URI with cough    2. Sore throat        Plan:   Flu test negative   Strep test negative   Declines COVID test   Follow-up in 2 days if symptoms worsen or do not improve       A cold is caused by a virus that can settle in your nose, throat or lungs. This causesa runny or stuffy nose and sneezing. You may also have a sore throat, cough, headache, fever and muscle aches. Different cold viruses last different lengthsof time, but the average time is 2 to 14 days.    Seek immediate medical care if you develop fever, chest pain, or shortness of breath.     Treatment: There is no cure for the common cold, there is only symptomatic care.     Antibiotics may be used to treat signs of a secondary infection, but they do not  treat  the cold virus. Try these tips to keep yourself comfortable:                   -Get plenty of rest.                   -Drink plenty of fluids, at least 8 large glasses of fluid a day. Good fluid choices are water, fruit juices high in Vitamin C, tea, gelatin, or broths and soups. These help to keep mucus thin and ease congestion.                  -Use salt water gargle, cough drops or throat sprays to relieve throat pain. Mi ¼ to ½ teaspoon of salt in 1 cup of warm water for a salt water gargle  solution.                  -Use petroleum jelly or lip balm around lips and nose to prevent chapping.                  -Use saline nose drops or spray to help ease congestion.    Over the Counter (OTC) Medicines:  Take over the counter medicines as needed to ease your signs.  Read labels carefully.  Use a product that treats only the signs that you have. Ask your pharmacist  for recommendations. Be sure to ask about possible interactions with other  medicines you are taking.  Common medicines used to treat signs of a cold include:    #Antihistamines that dry secretions in your nose and lungs. Some of these  may cause you to feel drowsy. Talk to your pharmacist before use if you  have glaucoma or an enlarged prostate.  Names of some medicines in this group include:  - Diphenhydramine  - Brompheniramine  - Chlorpheniramine  - Clemastine    # Decongestants that tighten blood vessels in your nose to decrease  stuffiness and pressure. Use nasal spray decongestants for up to three days  only. Longer use can make congestion worse. Talk to your pharmacist  before use if you have high blood pressure, heart disease, diabetes or an  enlarged prostate.    Names of some medicines in this group include:  - Pseudoephedrine (Sudafed)- kept behind the counter and requires identification  to purchase in limited quantities because it can be used to make illegal  drugs  - Phenylephrine  - Oxymetazoline nasal spray (Afrin)  - Cough  suppressant, also called antitussive, such as dextromethorphan.  This medicine decreases your reflex and sensitivity to cough. This  medicine may be kept behind the pharmacy counter for purchase.  - Expectorant, sometimes called mucolytic, such as guaifenesin (mucinex). This  medicine thins mucus secretions in the lungs to make it easier for you to  cough up and out. (Be sure to drink plenty of fluids when taking this medication)  Cold and cough medicines often contain more than one type of medicine.  Ask the pharmacist for help to confirm that you are not using more than one  product with the same or similar ingredient. For example, some cold and  cough medicines have acetaminophen or ibuprofen in them to help lower a  fever or ease muscle aches. Do not take extra acetaminophen (Tylenol) or  ibuprofen (Advil, Motrin) if the cold or cough medicine has it as an  ingredient. Too much medicine could be harmful.    Take the correct dose as listed on the package. Do not take more than  recommended.    Use a Humidifier:  A cool mist humidifier can make breathing easier by thinning mucus. Do not use  a steam humidifier as hot water can cause burns if spilled.  Place the humidifier a few feet from the bed. Drain and clean each day with  soap and water to prevent bacteria and mold from growing.  Indoor humidity should not be above 50%. Stop using the humidifier if you  notice moisture on windows, walls or pictures.  You do not need to add any medicine to the humidifier.  If you cannot get a humidifier, place a pan of water next to heating vents and  refill the water level daily. The water will evaporate and add moisture to the  Room.    How to prevent the spread of colds  -Wash your hands with soap and water or use alcohol based hand   often. Dry hands wet from washing with soap on a paper towel instead of cloth towel.  -Cough or sneeze into your elbow to avoid spreading germs.  -Wipe down common surfaces, such as  door knobs and faucet handles, with a disinfectant spray.  -Do not share cups or utensils.        -Flonase daily.  -Claritin or Zyrtec daily.  -Tessalon perles as needed for coughing.  -3 days of steroids.       Please follow up with your Primary care provider within 2-5 days if your signs and symptoms have not resolved or worsen.      If your condition worsens or fails to improve we recommend that you receive another evaluation at the emergency room immediately or contact your primary medical clinic to discuss your concerns.   You must understand that you have received an Urgent Care treatment only and that you may be released before all of your medical problems are known or treated. You, the patient, will arrange for follow up care as instructed.              Viral URI with cough  -     benzonatate (TESSALON) 200 MG capsule; Take 1 capsule (200 mg total) by mouth 3 (three) times daily as needed for Cough.  Dispense: 30 capsule; Refill: 0  -     predniSONE (DELTASONE) 20 MG tablet; Take 1 tablet (20 mg total) by mouth once daily. for 3 days  Dispense: 3 tablet; Refill: 0  -     fluticasone propionate (FLONASE) 50 mcg/actuation nasal spray; 1 spray (50 mcg total) by Each Nostril route once daily. for 7 days  Dispense: 18.2 mL; Refill: 0  -     loratadine (CLARITIN) 10 mg tablet; Take 1 tablet (10 mg total) by mouth once daily. for 7 days  Dispense: 7 tablet; Refill: 0    Sore throat  -     POCT Influenza A/B MOLECULAR  -     POCT Strep A, Molecular

## 2024-01-23 ENCOUNTER — CLINICAL SUPPORT (OUTPATIENT)
Dept: INTERNAL MEDICINE | Facility: CLINIC | Age: 54
End: 2024-01-23
Attending: FAMILY MEDICINE
Payer: COMMERCIAL

## 2024-01-23 ENCOUNTER — HOSPITAL ENCOUNTER (OUTPATIENT)
Dept: CARDIOLOGY | Facility: HOSPITAL | Age: 54
Discharge: HOME | End: 2024-01-23
Attending: FAMILY MEDICINE
Payer: COMMERCIAL

## 2024-01-23 VITALS
SYSTOLIC BLOOD PRESSURE: 122 MMHG | HEART RATE: 94 BPM | HEIGHT: 72 IN | OXYGEN SATURATION: 99 % | WEIGHT: 250.88 LBS | BODY MASS INDEX: 33.98 KG/M2 | DIASTOLIC BLOOD PRESSURE: 71 MMHG

## 2024-01-23 DIAGNOSIS — G47.00 INSOMNIA, UNSPECIFIED TYPE: ICD-10-CM

## 2024-01-23 DIAGNOSIS — E78.5 HYPERLIPIDEMIA, UNSPECIFIED HYPERLIPIDEMIA TYPE: ICD-10-CM

## 2024-01-23 DIAGNOSIS — Z00.00 ROUTINE GENERAL MEDICAL EXAMINATION AT A HEALTH CARE FACILITY: Primary | ICD-10-CM

## 2024-01-23 DIAGNOSIS — I70.0 AORTIC ATHEROSCLEROSIS: ICD-10-CM

## 2024-01-23 DIAGNOSIS — Z01.818 PREOPERATIVE CLEARANCE: ICD-10-CM

## 2024-01-23 DIAGNOSIS — M13.80 SERONEGATIVE ARTHRITIS: ICD-10-CM

## 2024-01-23 DIAGNOSIS — E03.9 HYPOTHYROIDISM (ACQUIRED): ICD-10-CM

## 2024-01-23 DIAGNOSIS — F40.248 FEAR OF PUBLIC SPEAKING: ICD-10-CM

## 2024-01-23 DIAGNOSIS — L40.59 OTHER PSORIATIC ARTHROPATHY: ICD-10-CM

## 2024-01-23 DIAGNOSIS — Z00.00 ANNUAL PHYSICAL EXAM: Primary | ICD-10-CM

## 2024-01-23 DIAGNOSIS — Z00.00 ROUTINE MEDICAL EXAM: ICD-10-CM

## 2024-01-23 LAB
ALBUMIN SERPL BCP-MCNC: 3.6 G/DL (ref 3.5–5.2)
ALP SERPL-CCNC: 54 U/L (ref 55–135)
ALT SERPL W/O P-5'-P-CCNC: 25 U/L (ref 10–44)
ANION GAP SERPL CALC-SCNC: 6 MMOL/L (ref 8–16)
AST SERPL-CCNC: 22 U/L (ref 10–40)
BILIRUB SERPL-MCNC: 0.4 MG/DL (ref 0.1–1)
BUN SERPL-MCNC: 14 MG/DL (ref 6–20)
CALCIUM SERPL-MCNC: 8.8 MG/DL (ref 8.7–10.5)
CHLORIDE SERPL-SCNC: 110 MMOL/L (ref 95–110)
CHOLEST SERPL-MCNC: 133 MG/DL (ref 120–199)
CHOLEST/HDLC SERPL: 3.2 {RATIO} (ref 2–5)
CO2 SERPL-SCNC: 22 MMOL/L (ref 23–29)
COMPLEXED PSA SERPL-MCNC: 0.27 NG/ML (ref 0–4)
CREAT SERPL-MCNC: 1.1 MG/DL (ref 0.5–1.4)
CV STRESS BASE HR: 81 BPM
DIASTOLIC BLOOD PRESSURE: 79 MMHG
ERYTHROCYTE [DISTWIDTH] IN BLOOD BY AUTOMATED COUNT: 12.3 % (ref 11.5–14.5)
EST. GFR  (NO RACE VARIABLE): >60 ML/MIN/1.73 M^2
ESTIMATED AVG GLUCOSE: 100 MG/DL (ref 68–131)
GLUCOSE SERPL-MCNC: 101 MG/DL (ref 70–110)
HBA1C MFR BLD: 5.1 % (ref 4–5.6)
HCT VFR BLD AUTO: 41.3 % (ref 40–54)
HDLC SERPL-MCNC: 42 MG/DL (ref 40–75)
HDLC SERPL: 31.6 % (ref 20–50)
HGB BLD-MCNC: 14.5 G/DL (ref 14–18)
LDLC SERPL CALC-MCNC: 47.6 MG/DL (ref 63–159)
MCH RBC QN AUTO: 30.9 PG (ref 27–31)
MCHC RBC AUTO-ENTMCNC: 35.1 G/DL (ref 32–36)
MCV RBC AUTO: 88 FL (ref 82–98)
NONHDLC SERPL-MCNC: 91 MG/DL
OHS CV CPX 1 MINUTE RECOVERY HEART RATE: 153 BPM
OHS CV CPX 85 PERCENT MAX PREDICTED HEART RATE MALE: 140
OHS CV CPX ESTIMATED METS: 15
OHS CV CPX MAX PREDICTED HEART RATE: 165
OHS CV CPX PATIENT IS FEMALE: 0
OHS CV CPX PATIENT IS MALE: 1
OHS CV CPX PEAK DIASTOLIC BLOOD PRESSURE: 69 MMHG
OHS CV CPX PEAK HEAR RATE: 164 BPM
OHS CV CPX PEAK RATE PRESSURE PRODUCT: NORMAL
OHS CV CPX PEAK SYSTOLIC BLOOD PRESSURE: 165 MMHG
OHS CV CPX PERCENT MAX PREDICTED HEART RATE ACHIEVED: 99
OHS CV CPX RATE PRESSURE PRODUCT PRESENTING: NORMAL
PLATELET # BLD AUTO: 342 K/UL (ref 150–450)
PMV BLD AUTO: 9.5 FL (ref 9.2–12.9)
POTASSIUM SERPL-SCNC: 4.2 MMOL/L (ref 3.5–5.1)
PROT SERPL-MCNC: 6.8 G/DL (ref 6–8.4)
RBC # BLD AUTO: 4.69 M/UL (ref 4.6–6.2)
SODIUM SERPL-SCNC: 138 MMOL/L (ref 136–145)
STRESS ECHO POST EXERCISE DUR MIN: 8 MINUTES
STRESS ECHO POST EXERCISE DUR SEC: 37 SECONDS
SYSTOLIC BLOOD PRESSURE: 124 MMHG
TRIGL SERPL-MCNC: 217 MG/DL (ref 30–150)
TSH SERPL DL<=0.005 MIU/L-ACNC: 4 UIU/ML (ref 0.4–4)
WBC # BLD AUTO: 11.08 K/UL (ref 3.9–12.7)

## 2024-01-23 PROCEDURE — 93017 CV STRESS TEST TRACING ONLY: CPT

## 2024-01-23 PROCEDURE — 84153 ASSAY OF PSA TOTAL: CPT | Performed by: FAMILY MEDICINE

## 2024-01-23 PROCEDURE — 99999 PR PBB SHADOW E&M-EST. PATIENT-LVL V: CPT | Mod: PBBFAC,,, | Performed by: FAMILY MEDICINE

## 2024-01-23 PROCEDURE — 3078F DIAST BP <80 MM HG: CPT | Mod: CPTII,S$GLB,, | Performed by: FAMILY MEDICINE

## 2024-01-23 PROCEDURE — 3044F HG A1C LEVEL LT 7.0%: CPT | Mod: CPTII,S$GLB,, | Performed by: FAMILY MEDICINE

## 2024-01-23 PROCEDURE — 99999 PR PBB SHADOW E&M-EST. PATIENT-LVL I: CPT | Mod: PBBFAC,,,

## 2024-01-23 PROCEDURE — 93016 CV STRESS TEST SUPVJ ONLY: CPT | Mod: ,,, | Performed by: INTERNAL MEDICINE

## 2024-01-23 PROCEDURE — 84443 ASSAY THYROID STIM HORMONE: CPT | Performed by: FAMILY MEDICINE

## 2024-01-23 PROCEDURE — 80061 LIPID PANEL: CPT | Performed by: FAMILY MEDICINE

## 2024-01-23 PROCEDURE — 80053 COMPREHEN METABOLIC PANEL: CPT | Performed by: FAMILY MEDICINE

## 2024-01-23 PROCEDURE — 1160F RVW MEDS BY RX/DR IN RCRD: CPT | Mod: CPTII,S$GLB,, | Performed by: FAMILY MEDICINE

## 2024-01-23 PROCEDURE — 85027 COMPLETE CBC AUTOMATED: CPT | Performed by: FAMILY MEDICINE

## 2024-01-23 PROCEDURE — 83036 HEMOGLOBIN GLYCOSYLATED A1C: CPT | Performed by: FAMILY MEDICINE

## 2024-01-23 PROCEDURE — 3074F SYST BP LT 130 MM HG: CPT | Mod: CPTII,S$GLB,, | Performed by: FAMILY MEDICINE

## 2024-01-23 PROCEDURE — 3008F BODY MASS INDEX DOCD: CPT | Mod: CPTII,S$GLB,, | Performed by: FAMILY MEDICINE

## 2024-01-23 PROCEDURE — 93018 CV STRESS TEST I&R ONLY: CPT | Mod: ,,, | Performed by: INTERNAL MEDICINE

## 2024-01-23 PROCEDURE — 99396 PREV VISIT EST AGE 40-64: CPT | Mod: S$GLB,,, | Performed by: FAMILY MEDICINE

## 2024-01-23 PROCEDURE — 1159F MED LIST DOCD IN RCRD: CPT | Mod: CPTII,S$GLB,, | Performed by: FAMILY MEDICINE

## 2024-01-23 RX ORDER — ZOLPIDEM TARTRATE 6.25 MG/1
6.25 TABLET, FILM COATED, EXTENDED RELEASE ORAL NIGHTLY PRN
Qty: 30 TABLET | Refills: 0 | Status: SHIPPED | OUTPATIENT
Start: 2024-01-23

## 2024-01-23 RX ORDER — PROPRANOLOL HYDROCHLORIDE 10 MG/1
10 TABLET ORAL DAILY PRN
Qty: 30 TABLET | Refills: 0 | Status: SHIPPED | OUTPATIENT
Start: 2024-01-23 | End: 2024-03-05

## 2024-01-23 RX ORDER — SECUKINUMAB 150 MG/ML
300 INJECTION SUBCUTANEOUS
COMMUNITY

## 2024-01-23 RX ORDER — ROSUVASTATIN CALCIUM 10 MG/1
10 TABLET, COATED ORAL NIGHTLY
Qty: 90 TABLET | Refills: 3 | Status: SHIPPED | OUTPATIENT
Start: 2024-01-23

## 2024-01-23 RX ORDER — ZOLPIDEM TARTRATE 6.25 MG/1
6.25 TABLET, FILM COATED, EXTENDED RELEASE ORAL NIGHTLY PRN
Qty: 30 TABLET | Refills: 0 | Status: SHIPPED | OUTPATIENT
Start: 2024-01-23 | End: 2024-01-23 | Stop reason: SDUPTHER

## 2024-01-23 RX ORDER — LEVOTHYROXINE SODIUM 100 UG/1
100 TABLET ORAL DAILY
Qty: 90 TABLET | Refills: 3 | Status: SHIPPED | OUTPATIENT
Start: 2024-01-23 | End: 2024-04-22

## 2024-01-23 RX ORDER — PROPRANOLOL HYDROCHLORIDE 10 MG/1
TABLET ORAL
Qty: 90 TABLET | OUTPATIENT
Start: 2024-01-23

## 2024-01-23 NOTE — PROGRESS NOTES
Subjective:       Patient ID: Eve Choi III is a 53 y.o. male.    Chief Complaint: Executive Health    New patient to establish care and annual wellness check, physical exam; and also chronic disease management. Specific complaints - see dictation, M*model entries and please see ROS.  Past, Surgical, Family, Social Histories; Medications, Allergies reviewed and reconciled.  Health maintenance file reviewed and addressed items due. Recent applicable lab, imaging and cardiovascular results reviewed.  Problem list items reviewed and modified or added entries (in the overview section) may not be transcribed into this encounter note due to note writer format.    NSIHx        Review of Systems   Constitutional:  Negative for appetite change, chills, diaphoresis, fatigue and fever.   HENT:  Negative for congestion, postnasal drip, rhinorrhea, sore throat and trouble swallowing.    Eyes:  Negative for visual disturbance.   Respiratory:  Negative for cough, choking, chest tightness, shortness of breath and wheezing.    Cardiovascular:  Negative for chest pain and leg swelling.   Gastrointestinal:  Negative for abdominal distention, abdominal pain, diarrhea, nausea and vomiting.   Genitourinary:  Negative for difficulty urinating and hematuria.   Musculoskeletal:  Negative for arthralgias and myalgias.   Skin:  Negative for rash.   Neurological:  Negative for weakness, light-headedness and headaches.   Psychiatric/Behavioral:  Negative for confusion and dysphoric mood.        Objective:      Physical Exam  Vitals and nursing note reviewed.   Constitutional:       Appearance: He is well-developed. He is not diaphoretic.   Eyes:      General: No scleral icterus.  Neck:      Thyroid: No thyromegaly.      Vascular: No carotid bruit or JVD.      Trachea: No tracheal deviation.   Cardiovascular:      Rate and Rhythm: Normal rate and regular rhythm.      Heart sounds: Normal heart sounds. No murmur heard.     No friction rub.  No gallop.   Pulmonary:      Effort: Pulmonary effort is normal. No respiratory distress.      Breath sounds: Normal breath sounds. No wheezing or rales.   Abdominal:      General: There is no distension.      Palpations: Abdomen is soft. There is no mass.      Tenderness: There is no abdominal tenderness. There is no guarding or rebound.   Musculoskeletal:      Cervical back: Normal range of motion and neck supple.   Lymphadenopathy:      Cervical: No cervical adenopathy.   Skin:     General: Skin is warm and dry.      Findings: No erythema or rash.   Neurological:      Mental Status: He is alert and oriented to person, place, and time.      Cranial Nerves: No cranial nerve deficit.      Motor: No tremor.      Coordination: Coordination normal.      Gait: Gait normal.   Psychiatric:         Behavior: Behavior normal.         Thought Content: Thought content normal.         Judgment: Judgment normal.         Assessment:       1. Annual physical exam    2. Other psoriatic arthropathy    3. Aortic atherosclerosis    4. Hypothyroidism (acquired)    5. Hyperlipidemia, unspecified hyperlipidemia type    6. Seronegative arthritis    7. Insomnia, unspecified type    8. Fear of public speaking    9. Preoperative clearance        Plan:     Medication List with Changes/Refills   Current Medications    CELECOXIB (CELEBREX) 100 MG CAPSULE    Take 100 mg by mouth.    MELOXICAM (MOBIC) 15 MG TABLET    TAKE 1 TABLET(15 MG) BY MOUTH EVERY DAY    MULTIVITAMIN CAPSULE    Take 1 capsule by mouth once daily.    SECUKINUMAB (COSENTYX, 2 SYRINGES,) 150 MG/ML SYRG    Inject 300 mg into the skin.    SEMAGLUTIDE, WEIGHT LOSS, (WEGOVY) 0.25 MG/0.5 ML PNIJ    Inject 0.25 mg into the skin every 7 days.   Changed and/or Refilled Medications    Modified Medication Previous Medication    LEVOTHYROXINE (SYNTHROID) 100 MCG TABLET levothyroxine (SYNTHROID) 100 MCG tablet       Take 1 tablet (100 mcg total) by mouth once daily.    Take 1 tablet (100  mcg total) by mouth once daily.    PROPRANOLOL (INDERAL) 10 MG TABLET propranolol (INDERAL) 10 MG tablet       Take 1 tablet (10 mg total) by mouth daily as needed (public speaking).    Take 1 tablet (10 mg total) by mouth 2 (two) times daily as needed.    ROSUVASTATIN (CRESTOR) 10 MG TABLET rosuvastatin (CRESTOR) 10 MG tablet       Take 1 tablet (10 mg total) by mouth every evening.    Take 1 tablet (10 mg total) by mouth every evening.    ZOLPIDEM (AMBIEN CR) 6.25 MG CR TABLET zolpidem (AMBIEN CR) 6.25 MG CR tablet       Take 1 tablet (6.25 mg total) by mouth nightly as needed for Insomnia.    Take 1 tablet (6.25 mg total) by mouth nightly as needed for Insomnia.   Discontinued Medications    BENZONATATE (TESSALON) 200 MG CAPSULE    Take 1 capsule (200 mg total) by mouth 3 (three) times daily as needed for Cough.    CYCLOBENZAPRINE (FLEXERIL) 10 MG TABLET    Take 10 mg by mouth every evening.    FLUTICASONE PROPIONATE (FLONASE) 50 MCG/ACTUATION NASAL SPRAY    1 spray (50 mcg total) by Each Nostril route once daily. for 7 days    LORATADINE (CLARITIN) 10 MG TABLET    Take 1 tablet (10 mg total) by mouth once daily. for 7 days    PREDNISONE (DELTASONE) 20 MG TABLET    Take 1 tablet (20 mg total) by mouth once daily. for 3 days     1. Annual physical exam    2. Other psoriatic arthropathy  Overview:  -followed by Dr. Fernandes locally, has been evaluated @ [facility] [place], psoriatic arthritis. Records NA.      3. Aortic atherosclerosis  Overview:  -incidental on 10/19/2022 CT renal stone - 'Mild scattered calcific atherosclerosis of the abdominal aorta which otherwise tapers normally'  -on statin    Orders:  -     Ambulatory referral/consult to Cardiology; Future; Expected date: 01/30/2024    4. Hypothyroidism (acquired)  Overview:  -diagnosed many years ago, treatment initiation data not available  -seen by endocrinologist 10/4/2022    Assessment & Plan:  -antithyroglobulin AB pos last year, inquiry to endo  sent  -will consult for review this year    Orders:  -     Ambulatory referral/consult to Endocrinology; Future; Expected date: 01/30/2024  -     levothyroxine (SYNTHROID) 100 MCG tablet; Take 1 tablet (100 mcg total) by mouth once daily.  Dispense: 90 tablet; Refill: 3    5. Hyperlipidemia, unspecified hyperlipidemia type  -     rosuvastatin (CRESTOR) 10 MG tablet; Take 1 tablet (10 mg total) by mouth every evening.  Dispense: 90 tablet; Refill: 3    6. Seronegative arthritis  Overview:  -followed by Dr. Fernandes locally, has been evaluated @ [facility] [place], psoriatic arthritis. Records NA.      7. Insomnia, unspecified type  Overview:  -occasional, states uses ambien cr twice a month    Orders:  -     zolpidem (AMBIEN CR) 6.25 MG CR tablet; Take 1 tablet (6.25 mg total) by mouth nightly as needed for Insomnia.  Dispense: 30 tablet; Refill: 0    8. Fear of public speaking  -     propranoloL (INDERAL) 10 MG tablet; Take 1 tablet (10 mg total) by mouth daily as needed (public speaking).  Dispense: 30 tablet; Refill: 0    9. Preoperative clearance  Assessment & Plan:  L TKA march pending, Dr. Berman        See meds, orders, follow up, routing and instructions sections of encounter and AVS. Discussed with patient and provided on AVS.    Discussed diet and exercise as therapeutic modalities for metabolic and other conditions. Provided patient information, which are included as links on the AVS for detailed information.    Lab Results   Component Value Date     01/23/2024    K 4.2 01/23/2024     01/23/2024    BUN 14 01/23/2024    CREATININE 1.1 01/23/2024     01/23/2024    HGBA1C 5.1 01/23/2024    AST 22 01/23/2024    ALT 25 01/23/2024    ALBUMIN 3.6 01/23/2024    PROT 6.8 01/23/2024    BILITOT 0.4 01/23/2024    CHOL 133 01/23/2024    HDL 42 01/23/2024    LDLCALC 47.6 (L) 01/23/2024    TRIG 217 (H) 01/23/2024    WBC 11.08 01/23/2024    HGB 14.5 01/23/2024    HCT 41.3 01/23/2024     01/23/2024     PSA 0.27 01/23/2024    TSH 3.997 01/23/2024    URICACID 6.2 01/11/2018       15 ETT non-ischemic    Surgical Risk Assessment     Active cardiac issues:  Active decompensated heart failure? No   Unstable angina?  No   Significant uncontrolled arrhythmias? No   Severe valvular heart disease-Aortic or Mitral Stenosis? No   Recent MI or coronary revascularization < 30 days? No     Clinical risk factors predicting perioperative major adverse cardiac events per RCRI  High risk surgery (suprainguinal vascular, intraperitoneal, or intrathoracic surgery)? No   History of CAD/ischemic heart disease? No   History of cerebrovascular disease (CVA or TIA)? No   History of compensated heart failure? No   Type 2 diabetes requiring insulin? No   Serum Creatinine > 2? No   Total cardiac risk factors 0     0 predictors = 3.9%, 1 predictor = 6.0%, 2 predictors = 10.1%, ?3 predictors = >15%    According to the revised cardiac risk index, the risk of huy-procedural major cardiac complications (cardiac death, nonfatal MI, nonfatal cardiac arrest, postoperative cardiogenic pulmonary edema, complete heart block) is: 3.9 .     From Duceppe 2017, based on pooled data from 5 high quality external validations (4 prospective). These numbers are higher than those often quoted from the now-outdated original study (Maulik 1999).    If patient has a low risk of MACE (<1%), proceed to surgery. If the patient is at elevated risk of MACE, then determine functional capacity (pt reported activity or DASI model).     If the patient has moderate, good, or excellent functional capacity (?4 METs), then proceed to surgery without further evaluation. If patient has poor or unknown functional capacity, will further testing impact decision making or perioperative care? If yes, then pharmacological stress testing is appropriate. In those patients with unknown functional capacity, exercise stress testing may be reasonable to perform.     Patient's functional  mets: well over 4, circuit training. Nml ETT today.    < 4 METs -unable to walk > 2 blocks on level ground without stopping due to symptoms  - eating, dressing, toileting, walking indoors, light housework. POOR   > 4 METs -climbing > 1 flight of stairs without stopping  -walking up hill > 1-2 blocks  -scrubbing floors  -moving furniture  - golf, bowling, dancing or tennis  -running short distance MODERATE to EXCELLENT     OR   [email or web address]-activity-[email or web address]-[email or web address]    Clear for surgery and cc to referring physician.

## 2024-01-23 NOTE — PATIENT INSTRUCTIONS
Information about cholesterol, high blood pressure and healthy diet and activity recommendations can be found at the following links on the Internet:    Cholesterol  https://www.nhlbi.nih.gov/resources/your-guide-lowering-cholesterol-therapeutic-lifestyle-changes-tlc    Weight loss  http://www.nhlbi.nih.gov/health/educational/lose_wt/index.htm    High Blood Pressure  https://www.nhlbi.nih.gov/files/docs/public/heart/new_dash.pdf    Cardiovascular General  http://www.heart.org/HEARTORG/    Diabetes General  http://diabetes.org/    CDC  https://www.cdc.gov/    Healthfinder  Https://healthfinder.gov/    Dietary Guide - Comprehensive  https://health.gov/dietaryguidelines/2015/guidelines/    Physical Activity and Exercise  https://health.gov/paguidelines/second-edition/pdf/Physical_Activity_Guidelines_2nd_edition.pdf    Choosing Wisely  https://www.choosingwisely.org/patient-resources/

## 2024-01-23 NOTE — TELEPHONE ENCOUNTER
Refill Decision Note   Eve Choi  is requesting a refill authorization.  Brief Assessment and Rationale for Refill:  Quick Discontinue     Medication Therapy Plan:         Comments:     Note composed:12:55 PM 01/23/2024             Appointments     Last Visit   1/23/2024 Mega Argueta MD   Next Visit   Visit date not found Mega Argueta MD

## 2024-01-23 NOTE — TELEPHONE ENCOUNTER
No care due was identified.  [facility] Embedded Care Due Messages. Reference number: [id number].   1/23/2024 11:24:25 AM CST

## 2024-01-23 NOTE — PROGRESS NOTES
"ENDOCRINOLOGY INITIAL VISIT  01/24/2024    Reason for Visit:  referred by Mega Argueta MD for evaluation of weight gain    HPI:  Eve Choi III is a 53 y.o. male with hx of RA (periodically treated with short courses of prednisone), hyperlipidemia, hypothyroidism, GERD who presents for evaluation of weight gain.    Previous patient of Dr Soto. Last visit in Sept 2022.    At his last visit we added wegovy but he was unable to obtain from pharmacy. He is now on compounded version of Ozempic 1.75mg ~ lost around 25-30 pounds over 8 months. He would like to lose an additional 15-20 pounds. Considering increase of Ozempic with compounded pharmacy.     Wt Readings from Last 3 Encounters:   01/24/24 1333 115.2 kg (254 lb 1.3 oz)   01/23/24 1007 113.8 kg (250 lb 14.1 oz)   01/14/24 0901 122 kg (269 lb)     Regarding weight gain:    Patient reports that weight has increased gradually over time.   Baseline wt is about 210, goal is 230, now at 274    Previous wt management strategies:  Tried ideal protein, intermittent fasting, atkins, exercise    Current diet:  none    Physical Activity:  2-3 days/wk circuit training for 50 min     Denies any changes to medications recently (only occasionally getting short courses of prednisone for arthritis)  Not on any meds that cause wt gain.    Wt Readings from Last 3 Encounters:   01/24/24 115.2 kg (254 lb 1.3 oz)   01/23/24 113.8 kg (250 lb 14.1 oz)   01/14/24 122 kg (269 lb)      Symptoms of hypercortisolemia:     Yes  No   Easy bruising:  []   [x]    Fragility fracture: []   [x]  Only hx of fractures when younger from trauma  Violaceous Striae: []   [x]       Random cortisol  No results found for: "CORTISOL", "LABCORT"  Lab Results   Component Value Date    HGBA1C 5.1 01/23/2024     Denies hx of pancreatitis or MTC    With regards to hypothyroidism,     Dx with hypothyroidism >20 years ago  Does not remember symptoms on Dx but had blood work and was put on medication  + " Tg Ab  - TPO  Was on LT4 100 mcg daily but ran out >1 month ago and had labs this week that were normal    He is exercising circuit training 2 days weekly and cardio    current symptoms:     [x] weight gain and difficulty losing weight  [x] Fatigue  [] Constipation           [] Hair loss  [] Brittle nails  [] Mental fog [] Chest pain, palpations, or sob   [x]  cold intolerance  [] Denies complaints     Denies selenium   Denies Biotin usage      Lab Results   Component Value Date    TSH 3.997 01/23/2024    FREET4 0.94 05/25/2011      Latest Reference Range & Units 01/23/23 10:24   Thyroglobulin Ab Screen 0.0 - 3.9 IU/mL 52.5 (H)   Thyroperoxidase Antibodies <6.0 IU/mL <6.0   (H): Data is abnormally high    Lab Results   Component Value Date    TSH 3.997 01/23/2024    FREET4 0.94 05/25/2011        Latest Reference Range & Units 01/27/22 08:55 01/23/23 08:20 01/23/23 10:24 01/23/24 08:11   TSH 0.400 - 4.000 uIU/mL 3.300 2.634  3.997   Thyroglobulin Ab Screen 0.0 - 3.9 IU/mL   52.5 (H)    Thyroperoxidase Antibodies <6.0 IU/mL   <6.0    (H): Data is abnormally high    Review of patient's allergies indicates:  No Known Allergies      Current Outpatient Medications:     celecoxib (CELEBREX) 100 MG capsule, Take 100 mg by mouth., Disp: , Rfl:     levothyroxine (SYNTHROID) 100 MCG tablet, Take 1 tablet (100 mcg total) by mouth once daily., Disp: 90 tablet, Rfl: 3    meloxicam (MOBIC) 15 MG tablet, TAKE 1 TABLET(15 MG) BY MOUTH EVERY DAY, Disp: 90 tablet, Rfl: 3    multivitamin capsule, Take 1 capsule by mouth once daily., Disp: , Rfl:     propranoloL (INDERAL) 10 MG tablet, Take 1 tablet (10 mg total) by mouth daily as needed (public speaking)., Disp: 30 tablet, Rfl: 0    rosuvastatin (CRESTOR) 10 MG tablet, Take 1 tablet (10 mg total) by mouth every evening., Disp: 90 tablet, Rfl: 3    secukinumab (COSENTYX, 2 SYRINGES,) 150 mg/mL Syrg, Inject 300 mg into the skin., Disp: , Rfl:     semaglutide, weight loss, (WEGOVY) 0.25  mg/0.5 mL PnIj, Inject 0.25 mg into the skin every 7 days., Disp: 2 mL, Rfl: 0    zolpidem (AMBIEN CR) 6.25 MG CR tablet, Take 1 tablet (6.25 mg total) by mouth nightly as needed for Insomnia., Disp: 30 tablet, Rfl: 0      ROS: see HPI     PHYSICAL EXAM  /80   Pulse 82   Wt 115.2 kg (254 lb 1.3 oz)   SpO2 97%   BMI 34.46 kg/m²   Wt Readings from Last 3 Encounters:   01/24/24 115.2 kg (254 lb 1.3 oz)   01/23/24 113.8 kg (250 lb 14.1 oz)   01/14/24 122 kg (269 lb)   ]    Constitutional:  Pleasant,  in no acute distress.   HENT:   Eyes:     No scleral icterus.   Respiratory:   Effort normal   Neurological:  normal speech  Psych:  Normal mood and affect.    No bruising on extremities      IMAGING STUDIES    ASSESSMENT/PLAN  1. Hypothyroidism (acquired)  TSH    TSH    CANCELED: TSH      2. Weight gain        3. Hyperlipidemia, unspecified hyperlipidemia type          Hypothyroidism (acquired)  -antithyroglobulin AB pos in Jan 2023  - Was on LT4 100 mcg daily but ran out >1 month ago and had labs this week that were normal  -- Normal labs   -- Check TSH in 8 weeks     Weight gain  Now seeing bariatric for compounded ozempic     Hyperlipidemia  Continue statin    No follow-ups on file.    Berta Shah NP

## 2024-01-24 ENCOUNTER — OFFICE VISIT (OUTPATIENT)
Dept: ENDOCRINOLOGY | Facility: CLINIC | Age: 54
End: 2024-01-24
Attending: FAMILY MEDICINE
Payer: COMMERCIAL

## 2024-01-24 VITALS
OXYGEN SATURATION: 97 % | BODY MASS INDEX: 34.46 KG/M2 | WEIGHT: 254.06 LBS | SYSTOLIC BLOOD PRESSURE: 122 MMHG | HEART RATE: 82 BPM | DIASTOLIC BLOOD PRESSURE: 80 MMHG

## 2024-01-24 DIAGNOSIS — E03.9 HYPOTHYROIDISM (ACQUIRED): Primary | ICD-10-CM

## 2024-01-24 DIAGNOSIS — R63.5 WEIGHT GAIN: ICD-10-CM

## 2024-01-24 DIAGNOSIS — E78.5 HYPERLIPIDEMIA, UNSPECIFIED HYPERLIPIDEMIA TYPE: ICD-10-CM

## 2024-01-24 PROCEDURE — 1160F RVW MEDS BY RX/DR IN RCRD: CPT | Mod: CPTII,S$GLB,, | Performed by: NURSE PRACTITIONER

## 2024-01-24 PROCEDURE — 1159F MED LIST DOCD IN RCRD: CPT | Mod: CPTII,S$GLB,, | Performed by: NURSE PRACTITIONER

## 2024-01-24 PROCEDURE — 99214 OFFICE O/P EST MOD 30 MIN: CPT | Mod: S$GLB,,, | Performed by: NURSE PRACTITIONER

## 2024-01-24 PROCEDURE — 3079F DIAST BP 80-89 MM HG: CPT | Mod: CPTII,S$GLB,, | Performed by: NURSE PRACTITIONER

## 2024-01-24 PROCEDURE — 3008F BODY MASS INDEX DOCD: CPT | Mod: CPTII,S$GLB,, | Performed by: NURSE PRACTITIONER

## 2024-01-24 PROCEDURE — 3044F HG A1C LEVEL LT 7.0%: CPT | Mod: CPTII,S$GLB,, | Performed by: NURSE PRACTITIONER

## 2024-01-24 PROCEDURE — 3074F SYST BP LT 130 MM HG: CPT | Mod: CPTII,S$GLB,, | Performed by: NURSE PRACTITIONER

## 2024-01-24 PROCEDURE — 99999 PR PBB SHADOW E&M-EST. PATIENT-LVL III: CPT | Mod: PBBFAC,,, | Performed by: NURSE PRACTITIONER

## 2024-01-24 NOTE — ASSESSMENT & PLAN NOTE
-antithyroglobulin AB pos in Jan 2023  - Was on LT4 100 mcg daily but ran out >1 month ago and had labs this week that were normal  -- Normal labs   -- Check TSH in 8 weeks

## 2024-01-24 NOTE — PATIENT INSTRUCTIONS
Check TSH in 8 weeks     Definition  Hashimoto's disease is a disorder that affects your thyroid, a small gland at the base of your neck, below your Sudeep's apple. The thyroid gland is part of your endocrine system, which produces hormones that coordinate many of your body's activities.   In Hashimoto's disease, also known as chronic lymphocytic thyroiditis, your immune system attacks your thyroid gland. The resulting inflammation often leads to an underactive thyroid gland (hypothyroidism). Hashimoto's disease is the most common cause of hypothyroidism in the [place]. It primarily affects middle-aged women, but also can occur in men and women of any age and in children.   Doctors test your thyroid function to help detect Hashimoto's disease. Treatment of Hashimoto's disease with thyroid hormone replacement usually is simple and effective.     Symptoms  Hashimoto's disease does not have unique signs and symptoms. The disease typically progresses slowly over a number of years and causes chronic thyroid damage, leading to a drop in thyroid hormone levels in your blood. The signs and symptoms are mainly those of an underactive thyroid gland (hypothyroidism).   The signs and symptoms of hypothyroidism vary widely, depending on the severity of hormone deficiency. At first, you may barely notice any symptoms, such as fatigue and sluggishness, or you may simply attribute them to getting older. But as the disease progresses, you may develop more-obvious signs and symptoms. Signs and symptoms of hypothyroidism include:   Fatigue and sluggishness   Increased sensitivity to cold   Constipation   Pale, dry skin   A puffy face   Hoarse voice   An elevated blood cholesterol level   Unexplained weight gain -- occurring infrequently and rarely exceeding 10 to 20 pounds, most of which is fluid   Muscle aches, tenderness and stiffness, especially in your shoulders and hips   Pain and stiffness in your joints and swelling in your  knees or the small joints in your hands and feet   Muscle weakness, especially in your lower extremities   Excessive or prolonged menstrual bleeding (menorrhagia)   Depression  Without treatment, signs and symptoms gradually become more severe and your thyroid gland may become enlarged (goiter). In addition, you may become more forgetful, your thought processes may slow or you may feel depressed.     When to see a doctor   See your doctor if you develop these signs and symptoms:   Tiredness for no apparent reason   Dry skin   Pale, puffy face   Constipation   Hoarse voice  You'll also need to see your doctor for periodic testing of your thyroid function if:   You've had thyroid surgery   You've had treatment with radioactive iodine or anti-thyroid medications   You've had radiation therapy to your head, neck or upper chest  If you have high blood cholesterol, talk to your doctor about whether hypothyroidism may be a cause. And if you're receiving hormone therapy for hypothyroidism caused by Hashimoto's disease, schedule follow-up visits as often as your doctor recommends. Initially, it's important to make sure you're receiving the correct dose of medicine. And over time, the dose you need to adequately replace your thyroid function may change.     Causes  Hashimoto's disease is an autoimmune disorder in which your immune system creates antibodies that damage your thyroid gland. Doctors don't know what causes your immune system to attack your thyroid gland. Some scientists think a virus or bacterium might trigger the response, while others believe a genetic flaw may be involved.   A combination of factors, including heredity, sex and age, may determine your likelihood of developing the disorder. Hashimoto's disease is most common in middle-aged women and tends to run in families.     Complications  Left untreated, an underactive thyroid gland (hypothyroidism) caused by Hashimoto's disease can lead to a number of  "health problems:   Goiter. Constant stimulation of your thyroid to release more hormones may cause the gland to become enlarged, a condition known as goiter. Hypothyroidism is one of the most common causes of goiter. Although generally not uncomfortable, a very large goiter can affect your appearance and may interfere with swallowing or breathing.   Heart problems. Hashimoto's disease also may be associated with an increased risk of heart disease, primarily because high levels of low-density lipoprotein (LDL) cholesterol -- the "bad" cholesterol -- can occur in people with an underactive thyroid gland (hypothyroidism). If left untreated, hypothyroidism can lead to an enlarged heart and, in rare cases, heart failure.   Mental health issues. Depression may occur early in Hashimoto's disease and may become more severe over time. Hashimoto's disease can also cause sexual desire (libido) to decrease in both men and women and can lead to slowed mental functioning.   Myxedema ([id number]). This rare, life-threatening condition can develop due to long-term hypothyroidism as a result of untreated Hashimoto's disease. Its signs and symptoms include intense cold intolerance and drowsiness followed by profound lethargy and unconsciousness. A myxedema coma may be triggered by sedatives, infection or other stress on your body. Myxedema requires immediate emergency medical treatment.   Birth defects. Babies born to women with untreated hypothyroidism due to Hashimoto's disease may have a higher risk of birth defects than do babies born to healthy mothers. Doctors have long known that these children are more prone to intellectual and developmental problems. There may be a link between hypothyroid pregnancies and birth defects, such as cleft palate. A connection also exists between hypothyroid pregnancies and heart, brain and kidney problems in infants. If you're planning to get pregnant or if you're in early pregnancy, be sure " to have your thyroid level checked.  Have you noticed a change in your sensitivity to cold?   Have you felt more forgetful than usual?   Has your interest in sex decreased? If you're a woman, has your menstrual cycle changed?   Are you being treated or have you recently been treated for any other medical conditions?   Do any of your family members have thyroid disease?    Tests and diagnosis  In general, your doctor may test for Hashimoto's disease if you're feeling increasingly tired or sluggish, have dry skin, constipation and a hoarse voice, or have had previous thyroid problems or goiter.   Diagnosis of Hashimoto's disease is based on your signs and symptoms and the results of blood tests that measure levels of thyroid hormone and thyroid-stimulating hormone (TSH). These may include:   A hormone test. Blood tests can determine the amount of hormones produced by your thyroid and pituitary glands. If your thyroid is underactive, the level of thyroid hormone is low. At the same time, the level of TSH is elevated because your pituitary gland tries to stimulate your thyroid gland to produce more thyroid hormone.   An antibody test. Because Hashimoto's disease is an autoimmune disorder, the cause involves production of abnormal antibodies. A blood test may confirm the presence of antibodies against thyroid peroxidase, an enzyme normally found in the thyroid gland that plays an important role in the production of thyroid hormones.  In the past, doctors weren't able to detect underactive thyroid (hypothyroidism), the main indicator of Hashimoto's disease, until symptoms were fairly advanced. But by using the sensitive TSH test, doctors can diagnose thyroid disorders much earlier, often before you experience symptoms. Because the TSH test is the best screening test, your doctor will likely check TSH first and follow with a thyroid hormone test if needed. TSH tests also play an important role in managing hypothyroidism.  These tests also help your doctor determine the right dosage of medication, both initially and over time.     Treatments and drugs  Treatment for Hashimoto's disease may include observation and use of medications. If there's no evidence of hormone deficiency and your thyroid is functioning normally, your doctor may suggest a wait-and-see approach. If you do need medication, chances are you'll need it for the rest of your life.     Synthetic hormones   If Hashimoto's disease causes thyroid hormone deficiency, you may need replacement therapy with thyroid hormone. This usually involves daily use of the synthetic thyroid hormone levothyroxine (Levothroid, Levoxyl, Synthroid). Synthetic levothyroxine is identical to thyroxine, the natural version of this hormone made by your thyroid gland. The oral medication restores adequate hormone levels and reverses all the symptoms of hypothyroidism.   Soon after starting treatment, you'll notice that you're feeling less fatigued. The medication also gradually lowers cholesterol levels elevated by the disease and may reverse any weight gain. Treatment with levothyroxine is usually lifelong, but because the dosage you need may change, your doctor is likely to check your TSH level every six to 12 months.     Monitoring the dosage   To determine the right dosage of levothyroxine initially, your doctor generally checks your level of TSH after a few weeks of treatment. Excessive amounts of the hormone can accelerate bone loss, which may make osteoporosis worse or add to your risk of this disease. Overtreatment with levothyroxine also can cause heart rhythm disorders (arrhythmias).   If you have coronary artery disease or severe hypothyroidism, your doctor may start treatment with a smaller amount of medication and gradually increase the dosage. Progressive hormone replacement allows your heart to adjust to the increase in metabolism.   Levothyroxine causes virtually no side effects  when used in the appropriate dose and is relatively inexpensive. If you change brands, let your doctor know to ensure you're still receiving the right dosage. Also, don't skip doses or stop taking the drug because you're feeling better. If you do, signs and symptoms will gradually return.     Effects of other substances   Certain medications, supplements and even some foods may affect your ability to absorb levothyroxine. Talk to your doctor if you eat large amounts of soy products or a high-fiber diet, or if you take any of the following:   Iron supplements, including multivitamins that contain iron   Cholestyramine (Questran), a medication used to lower blood cholesterol levels   Aluminum hydroxide, which is found in some antacids   Sodium polystyrene sulfonate (Kayexalate), used to prevent high blood potassium levels   Sucralfate, an ulcer medication   Calcium supplements    Alternative medicine  Most doctors recommend levothyroxine, the synthetic form of thyroxine (T-4). However, natural extracts are available that contain thyroid hormone derived from the thyroid glands of pigs. These products -- [employer] Thyroid, for example -- contain both levothyroxine and triiodothyronine (T-3).   Doctors have a number of concerns about natural thyroid hormone extracts such as [employer] Thyroid, including:   The balance of T-4 and T-3 in animals isn't the same as in humans.   The exact amount of T-4 and T-3 in each batch of a natural extract product can vary, leading to unpredictable levels of these hormones in your blood.  Still, researchers have investigated whether adjusting standard hypothyroidism treatment to replace some T-4 with T-3 may offer benefit. The majority of studies have determined that the addition of T-3 does not offer any advantage over treatment with T-4 alone.   However, there is some evidence that T-3 may offer benefit to certain subsets of people, such as people who have had their thyroid surgically removed  (thyroidectomy). Research is ongoing.

## 2024-02-19 ENCOUNTER — PATIENT MESSAGE (OUTPATIENT)
Dept: INTERNAL MEDICINE | Facility: CLINIC | Age: 54
End: 2024-02-19
Payer: COMMERCIAL

## 2024-03-03 DIAGNOSIS — F40.248 FEAR OF PUBLIC SPEAKING: ICD-10-CM

## 2024-03-03 NOTE — TELEPHONE ENCOUNTER
No care due was identified.  [facility] Embedded Care Due Messages. Reference number: [id number].   3/03/2024 2:49:53 PM CST

## 2024-03-03 NOTE — TELEPHONE ENCOUNTER
Refill Routing Note   Medication(s) are not appropriate for processing by [facility] for the following reason(s):        New or recently adjusted medication    [facility] action(s):  Defer             Appointments  past 12m or future 3m with PCP    Date Provider   Last Visit   1/23/2024 Mega Argueta MD   Next Visit   Visit date not found Mega Argueta MD   ED visits in past 90 days: 0        Note composed:4:16 PM 03/03/2024

## 2024-03-05 RX ORDER — PROPRANOLOL HYDROCHLORIDE 10 MG/1
TABLET ORAL
Qty: 30 TABLET | Refills: 0 | Status: SHIPPED | OUTPATIENT
Start: 2024-03-05

## 2024-03-07 DIAGNOSIS — F40.248 FEAR OF PUBLIC SPEAKING: ICD-10-CM

## 2024-03-07 RX ORDER — PROPRANOLOL HYDROCHLORIDE 10 MG/1
TABLET ORAL
Qty: 90 TABLET | OUTPATIENT
Start: 2024-03-07

## 2024-03-07 NOTE — TELEPHONE ENCOUNTER
Refill Decision Note  Quick DC. Request already responded to by other means (e.g. phone or fax)      Eve Choi  is requesting a refill authorization.  Brief Assessment and Rationale for Refill:  Quick Discontinue     Medication Therapy Plan:  Receipt confirmed by pharmacy (3/5/2024 10:25 PM CST) yamile      Comments:     Note composed:10:42 AM 03/07/2024

## 2024-03-07 NOTE — TELEPHONE ENCOUNTER
No care due was identified.  [facility] Embedded Care Due Messages. Reference number: [id number].   3/07/2024 10:34:50 AM CST

## 2024-03-18 ENCOUNTER — TELEPHONE (OUTPATIENT)
Dept: CARDIOLOGY | Facility: CLINIC | Age: 54
End: 2024-03-18
Payer: COMMERCIAL

## 2024-03-19 ENCOUNTER — OFFICE VISIT (OUTPATIENT)
Dept: CARDIOLOGY | Facility: CLINIC | Age: 54
End: 2024-03-19
Attending: FAMILY MEDICINE
Payer: COMMERCIAL

## 2024-03-19 VITALS
OXYGEN SATURATION: 97 % | WEIGHT: 244.94 LBS | SYSTOLIC BLOOD PRESSURE: 118 MMHG | HEART RATE: 80 BPM | BODY MASS INDEX: 33.18 KG/M2 | HEIGHT: 72 IN | DIASTOLIC BLOOD PRESSURE: 80 MMHG

## 2024-03-19 DIAGNOSIS — E78.2 MIXED HYPERLIPIDEMIA: ICD-10-CM

## 2024-03-19 DIAGNOSIS — I70.0 AORTIC ATHEROSCLEROSIS: Primary | ICD-10-CM

## 2024-03-19 PROCEDURE — 1160F RVW MEDS BY RX/DR IN RCRD: CPT | Mod: CPTII,S$GLB,, | Performed by: INTERNAL MEDICINE

## 2024-03-19 PROCEDURE — 3074F SYST BP LT 130 MM HG: CPT | Mod: CPTII,S$GLB,, | Performed by: INTERNAL MEDICINE

## 2024-03-19 PROCEDURE — 3079F DIAST BP 80-89 MM HG: CPT | Mod: CPTII,S$GLB,, | Performed by: INTERNAL MEDICINE

## 2024-03-19 PROCEDURE — 3008F BODY MASS INDEX DOCD: CPT | Mod: CPTII,S$GLB,, | Performed by: INTERNAL MEDICINE

## 2024-03-19 PROCEDURE — 99204 OFFICE O/P NEW MOD 45 MIN: CPT | Mod: S$GLB,,, | Performed by: INTERNAL MEDICINE

## 2024-03-19 PROCEDURE — 1159F MED LIST DOCD IN RCRD: CPT | Mod: CPTII,S$GLB,, | Performed by: INTERNAL MEDICINE

## 2024-03-19 PROCEDURE — 3044F HG A1C LEVEL LT 7.0%: CPT | Mod: CPTII,S$GLB,, | Performed by: INTERNAL MEDICINE

## 2024-03-19 PROCEDURE — 99999 PR PBB SHADOW E&M-EST. PATIENT-LVL V: CPT | Mod: PBBFAC,,, | Performed by: INTERNAL MEDICINE

## 2024-03-19 NOTE — PROGRESS NOTES
Subjective:   Patient ID:  Eve Choi III is a 53 y.o. male who presents for follow-up of aortic calcification      Assessment/Plan:     1. Aortic atherosclerosis - on statin.  Stress testing normal.  Reassurance    2. Mixed hyperlipidemia - LDL at goal.  TG slightly elevated. Continue weight loss.  Add OTC fish oil.              No orders of the defined types were placed in this encounter.          ___________________________________________________________________________________________    HPI:   Pt is here for aortic calcifications noted on CT for renal stones.  He has mixed hyperlipidemia and obesity.  Currently losing weight on GLP-1.  He denies any exertional chest discomfort, exertional dyspnea, orthopnea, PND, palpitations, TIA's, syncope or presyncope.  He exercises routinely and denies any CP sxs.   Normal GXT in 1-2024.  Currently on R10 with LDL<50.  TG slightly elevated.     Review CT scans from 10-22 and 1-2028 - both with mild descending aorta Ca.       Vitals:    03/19/24 0751   BP: 118/80   Pulse: 80   SpO2: 97%   Weight: 111.1 kg (244 lb 14.9 oz)   Height: 6' (1.829 m)     Body mass index is 33.22 kg/m².  CrCl cannot be calculated (Patient's most recent lab result is older than the maximum 7 days allowed.).    Lab Results   Component Value Date     01/23/2024    K 4.2 01/23/2024     01/23/2024    CO2 22 (L) 01/23/2024    BUN 14 01/23/2024    CREATININE 1.1 01/23/2024     01/23/2024    HGBA1C 5.1 01/23/2024    AST 22 01/23/2024    ALT 25 01/23/2024    ALBUMIN 3.6 01/23/2024    PROT 6.8 01/23/2024    BILITOT 0.4 01/23/2024    WBC 11.08 01/23/2024    HGB 14.5 01/23/2024    HCT 41.3 01/23/2024    MCV 88 01/23/2024     01/23/2024    INR 1.1 02/19/2024    PSA 0.27 01/23/2024    TSH 3.997 01/23/2024    CHOL 133 01/23/2024    HDL 42 01/23/2024    LDLCALC 47.6 (L) 01/23/2024    TRIG 217 (H) 01/23/2024       Current Outpatient Medications   Medication Sig    celecoxib  (CELEBREX) 100 MG capsule Take 100 mg by mouth.    levothyroxine (SYNTHROID) 100 MCG tablet Take 1 tablet (100 mcg total) by mouth once daily.    multivitamin capsule Take 1 capsule by mouth once daily.    rosuvastatin (CRESTOR) 10 MG tablet Take 1 tablet (10 mg total) by mouth every evening.    secukinumab (COSENTYX, 2 SYRINGES,) 150 mg/mL Syrg Inject 300 mg into the skin.    semaglutide, weight loss, (WEGOVY) 0.25 mg/0.5 mL PnIj Inject 0.25 mg into the skin every 7 days.    zolpidem (AMBIEN CR) 6.25 MG CR tablet Take 1 tablet (6.25 mg total) by mouth nightly as needed for Insomnia.    meloxicam (MOBIC) 15 MG tablet TAKE 1 TABLET(15 MG) BY MOUTH EVERY DAY (Patient not taking: Reported on 3/19/2024)    propranoloL (INDERAL) 10 MG tablet TAKE 1 TABLET BY MOUTH EVERY DAY AS NEEDED FOR PUBLIC SPEAKING     No current facility-administered medications for this visit.       Review of Systems   Constitutional: Negative for decreased appetite, malaise/fatigue, weight gain and weight loss.   Eyes:  Negative for visual disturbance.   Cardiovascular:  Negative for chest pain, claudication, dyspnea on exertion, irregular heartbeat, orthopnea, palpitations, paroxysmal nocturnal dyspnea and syncope.   Respiratory:  Negative for cough, shortness of breath and snoring.    Skin:  Negative for rash.   Musculoskeletal:  Negative for arthritis, muscle cramps, muscle weakness and myalgias.   Gastrointestinal:  Negative for abdominal pain, anorexia, change in bowel habit and nausea.   Genitourinary:  Negative for dysuria and frequency.   Neurological:  Negative for excessive daytime sleepiness, dizziness, headaches, loss of balance, numbness and weakness.   Psychiatric/Behavioral:  Negative for depression.        Objective:   Physical Exam  Constitutional:       Appearance: Normal appearance. He is well-developed.   HENT:      Head: Normocephalic and atraumatic.      Nose: Nose normal.   Cardiovascular:      Pulses: Intact distal pulses.    Pulmonary:      Effort: Pulmonary effort is normal. No accessory muscle usage or respiratory distress.   Skin:     General: Skin is dry.   Neurological:      General: No focal deficit present.      Mental Status: He is alert and oriented to person, place, and time.   Psychiatric:         Attention and Perception: Attention normal.         Mood and Affect: Mood and affect normal.         Speech: Speech normal.         Behavior: Behavior normal. Behavior is cooperative.         Thought Content: Thought content normal.         Judgment: Judgment normal.

## 2024-04-19 ENCOUNTER — PATIENT MESSAGE (OUTPATIENT)
Dept: ENDOCRINOLOGY | Facility: CLINIC | Age: 54
End: 2024-04-19
Payer: COMMERCIAL

## 2024-04-22 DIAGNOSIS — E03.9 HYPOTHYROIDISM (ACQUIRED): Primary | ICD-10-CM

## 2024-04-22 RX ORDER — LEVOTHYROXINE SODIUM 50 UG/1
50 TABLET ORAL DAILY
Qty: 30 TABLET | Refills: 11 | Status: SHIPPED | OUTPATIENT
Start: 2024-04-22 | End: 2025-04-22

## 2024-06-10 ENCOUNTER — PATIENT MESSAGE (OUTPATIENT)
Dept: INTERNAL MEDICINE | Facility: CLINIC | Age: 54
End: 2024-06-10
Payer: COMMERCIAL

## 2024-08-15 ENCOUNTER — TELEPHONE (OUTPATIENT)
Dept: PHARMACY | Facility: CLINIC | Age: 54
End: 2024-08-15
Payer: COMMERCIAL

## 2024-08-16 NOTE — TELEPHONE ENCOUNTER
[facility]/Population Health Chart Review & Patient Outreach Details For Medication Adherence Project    Reason for Outreach Encounter: 3rd Party payor non-compliance report ([employer], [employer], C, etc)  2.  Patient Outreach Method: Sokooshart message  3.   Medication in question: Rosuvastatin 10 mg    LAST FILLED: 1/23/24 for 90 day supply  Hyperlipidemia Medications               rosuvastatin (CRESTOR) 10 MG tablet Take 1 tablet (10 mg total) by mouth every evening.               4.  Reviewed and or Updates Made To: Patient Chart  5. Outreach Outcomes and/or actions taken: Sent inquiry to patient: Waiting for response.

## 2024-11-20 DIAGNOSIS — Z00.00 ROUTINE MEDICAL EXAM: Primary | ICD-10-CM

## 2024-12-04 ENCOUNTER — TELEPHONE (OUTPATIENT)
Dept: UROLOGY | Facility: CLINIC | Age: 54
End: 2024-12-04
Payer: COMMERCIAL

## 2024-12-04 DIAGNOSIS — N22 CALCULUS OF URINARY TRACT IN DISEASES CLASSIFIED ELSEWHERE: Primary | ICD-10-CM

## 2024-12-05 ENCOUNTER — TELEPHONE (OUTPATIENT)
Dept: UROLOGY | Facility: CLINIC | Age: 54
End: 2024-12-05
Payer: COMMERCIAL

## 2024-12-05 NOTE — TELEPHONE ENCOUNTER
Spoke w/patient- appointment confirmed.    ----- Message from Yany sent at 12/5/2024  7:39 AM CST -----  Regarding: FW: miss call  Patient returned your call please call him and done the message  ----- Message -----  From: Winsome Petty  Sent: 12/4/2024   4:46 PM CST  To: Shyam Nair Staff  Subject: miss call                                        Type:  Patient Returning Call     Who Called:pt    Who Left Message for Patient:Domenica Neely    Does the patient know what this is regarding?:yes    Would the patient rather a call back or a response via [facility]?     Best Call Back Number:[phone]    Additional Information:

## 2024-12-06 NOTE — PROGRESS NOTES
Subjective:      Eve Choi III is a 54 y.o. male who returns today regarding his     The patient location is: home  The chief complaint leading to consultation is: renal stone follow up    Visit type: audiovisual    Face to Face time with patient: 15 min  25 minutes of total time spent on the encounter, which includes face to face time and non-face to face time preparing to see the patient (eg, review of tests), Obtaining and/or reviewing separately obtained history, Documenting clinical information in the electronic or other health record, Independently interpreting results (not separately reported) and communicating results to the patient/family/caregiver, or Care coordination (not separately reported).         Each patient to whom he or she provides medical services by telemedicine is:  (1) informed of the relationship between the physician and patient and the respective role of any other health care provider with respect to management of the patient; and (2) notified that he or she may decline to receive medical services by telemedicine and may withdraw from such care at any time.    Notes:     Recent back pain resolved  On weight loss meds  Doing well    Has not seen any stones pass recently    The following portions of the patient's history were reviewed and updated as appropriate: allergies, current medications, past family history, past medical history, past social history, past surgical history and problem list.    Review of Systems  Pertinent items are noted in HPI.  A comprehensive multipoint review of systems was negative except as otherwise stated in the HPI.    Past Medical History:   Diagnosis Date    Arthritis     Hypercholesteremia     Thyroid disease      Past Surgical History:   Procedure Laterality Date    COLONOSCOPY      COLONOSCOPY N/A 3/25/2021    Procedure: COLONOSCOPY;  Surgeon: Jd Bundy MD;  Location: [address];  Service: Endoscopy;  Laterality: N/A;  covid test  3/22 primary care, instructions emailed-[facility]    HAND SURGERY  1990    right    KNEE CARTILAGE SURGERY  1993    5 procedures starting in 1985     Diabetes Medications               semaglutide, weight loss, (WEGOVY) 0.25 mg/0.5 mL PnIj Inject 0.25 mg into the skin every 7 days.          Review of patient's allergies indicates:  No Known Allergies       Objective:   Vitals: There were no vitals taken for this visit.    Physical Exam   General: alert and oriented, no acute distress  Respiratory: Symmetric expansion, non-labored breathing  Cardiovascular: no peripheral edema  Abdomen: non distended  Skin: normal coloration and turgor, no rashes, no suspicious skin lesions noted  Neuro: no gross deficits  Psych: normal judgment and insight, normal mood/affect, and non-anxious    Physical Exam    Lab Review   Urinalysis demonstrates no specimen    Lab Results   Component Value Date    WBC 11.08 01/23/2024    HGB 14.5 01/23/2024    HCT 41.3 01/23/2024    MCV 88 01/23/2024     01/23/2024     Lab Results   Component Value Date    CREATININE 1.1 01/23/2024    BUN 14 01/23/2024       Imaging  CT RENAL STONE STUDY ABD PELVIS WO     CLINICAL HISTORY:  Nephrolithiasis, uncomplicated; Calculus of urinary tract in diseases classified elsewhere     TECHNIQUE:  Low dose axial images, sagittal and coronal reformations were obtained from the lung bases to the pubic symphysis.  Contrast was not administered.     COMPARISON:  10/19/2022     FINDINGS:  :     -on the right, approximately 3 intrarenal stones.  Largest in the lower pole estimated at 4 mm.  On the prior study I see only the largest stone.  No hydronephrosis, hydroureter, or intraureteral stone.     -on the left, approximately 6 intrarenal stones, largest in the lower pole measuring 3 mm.  On the prior study, I see 3 or 4 stones in the collecting system.  No hydronephrosis, hydroureter, or intraureteral stone.     -urinary bladder is mostly collapsed.     Lung bases  are clear.  Limited evaluation of solid organs due to lack of intravenous contrast.  Allowing for this, the liver, spleen, and pancreas are unremarkable on this noncontrast study.  Gallbladder is contracted.     Aortic atherosclerosis.     Stomach and loops of bowel are normal in caliber.  Appendix is normal.  Colonic diverticulosis.  No free fluid in the pelvis.     Regional skeleton shows degenerative change.     Impression:     Bilateral nephrolithiasis without obstructive uropathy.  Stone burden is mildly increased compared to prior.        Electronically signed by:Tamera Bradley  Date:                                            12/09/2024  Time:                                           15:21  '     KUB     CLINICAL HISTORY:  Calculus of urinary tract in diseases classified elsewhere     TECHNIQUE:  Single AP supine view of the abdomen (KUB) was performed     COMPARISON:  None     FINDINGS:  Nonspecific, nonobstructive bowel gas pattern.  Mild retained stool in the colon.     At least 1 calcified stone seen along the lower pole right kidney.  No suspicious calcifications along the course of either ureter.     Dextroconvex curvature thoracolumbar junction.     Impression:     Nonobstructive bowel gas pattern.        Electronically signed by:Tamera Bradley  Date:                                            12/09/2024  Time:                                           16:09      Assessment and Plan:   Renal stones  Most of his stones appear to be uric acid but at least 1 of the stones in the lower pole of the right kidney appears to be calcified.  He will follow up with Nephrology to optimize medical management of his hyperuricosuria.  We discussed observation extracorporeal shockwave lithotripsy and ureteroscopy.     High fluid, low salt diet  Avoid coffee, tea and cola  Drink water and diet lemonade    Discuss the importance of hydration to prevent stones from worsening    Discussed urocit K; he wishes to avoid  taking any additional meds at this time    CMP and Uric acid and UA (will have these drawn with his labs in Jan 2025 when he sees Dr Argueta in [facility])    24 hour urine then see endocrine or nephrology for metabolic management of his stone disease    Will ask Dr Argueta to help coordinate referral for metabolic management of his stone disease    See Dr Howe in 6 months with KUB    Obesity  On Semiglutide per [facility]  High fluid, low salt diet  Avoid coffee, tea and cola  Drink water and diet lemonade

## 2024-12-09 ENCOUNTER — HOSPITAL ENCOUNTER (OUTPATIENT)
Dept: RADIOLOGY | Facility: OTHER | Age: 54
Discharge: HOME | End: 2024-12-09
Attending: UROLOGY
Payer: COMMERCIAL

## 2024-12-09 DIAGNOSIS — N22 CALCULUS OF URINARY TRACT IN DISEASES CLASSIFIED ELSEWHERE: ICD-10-CM

## 2024-12-09 PROCEDURE — 74176 CT ABD & PELVIS W/O CONTRAST: CPT | Mod: 26,,, | Performed by: RADIOLOGY

## 2024-12-09 PROCEDURE — 74018 RADEX ABDOMEN 1 VIEW: CPT | Mod: TC,FY

## 2024-12-09 PROCEDURE — 74018 RADEX ABDOMEN 1 VIEW: CPT | Mod: 26,,, | Performed by: RADIOLOGY

## 2024-12-09 PROCEDURE — 74176 CT ABD & PELVIS W/O CONTRAST: CPT | Mod: TC

## 2024-12-10 ENCOUNTER — OFFICE VISIT (OUTPATIENT)
Dept: UROLOGY | Facility: CLINIC | Age: 54
End: 2024-12-10
Payer: COMMERCIAL

## 2024-12-10 ENCOUNTER — TELEPHONE (OUTPATIENT)
Dept: UROLOGY | Facility: CLINIC | Age: 54
End: 2024-12-10

## 2024-12-10 DIAGNOSIS — N20.0 RENAL STONES: Primary | ICD-10-CM

## 2024-12-10 PROCEDURE — 99214 OFFICE O/P EST MOD 30 MIN: CPT | Mod: 95,,, | Performed by: UROLOGY

## 2024-12-10 PROCEDURE — 3044F HG A1C LEVEL LT 7.0%: CPT | Mod: CPTII,95,, | Performed by: UROLOGY

## 2024-12-10 NOTE — Clinical Note
24 hour urine now CMP, uric acid, and UA at his [facility] appt in Jan 2025 KUB in 6 months then see Dr Shyam Argueta, can you help Mr Choi get an appt to endocrine or nephrology for metabolic management of his stone disease after he sees you in January?  Thanks.  Korey [phone]

## 2024-12-10 NOTE — TELEPHONE ENCOUNTER
[employer] faxed.    ----- Message from Korey Howe MD sent at 12/10/2024  9:27 AM CST -----  24 hour urine now  CMP, uric acid, and UA at his executive [facility] appt in Jan 2025  KUB in 6 months then see Dr Shyam Argueta, can you help Mr Choi get an appt to endocrine or nephrology for metabolic management of his stone disease after he sees you in January?  Thanks.  Korey [phone]

## 2024-12-17 ENCOUNTER — CLINICAL SUPPORT (OUTPATIENT)
Dept: OTOLARYNGOLOGY | Facility: CLINIC | Age: 54
End: 2024-12-17
Payer: COMMERCIAL

## 2024-12-17 ENCOUNTER — PATIENT MESSAGE (OUTPATIENT)
Dept: OTOLARYNGOLOGY | Facility: CLINIC | Age: 54
End: 2024-12-17

## 2024-12-17 ENCOUNTER — OFFICE VISIT (OUTPATIENT)
Dept: OTOLARYNGOLOGY | Facility: CLINIC | Age: 54
End: 2024-12-17
Payer: COMMERCIAL

## 2024-12-17 VITALS
WEIGHT: 253.5 LBS | SYSTOLIC BLOOD PRESSURE: 110 MMHG | BODY MASS INDEX: 34.34 KG/M2 | HEART RATE: 87 BPM | DIASTOLIC BLOOD PRESSURE: 75 MMHG | HEIGHT: 72 IN

## 2024-12-17 DIAGNOSIS — H90.A22 SENSORINEURAL HEARING LOSS (SNHL) OF LEFT EAR WITH RESTRICTED HEARING OF RIGHT EAR: Primary | ICD-10-CM

## 2024-12-17 DIAGNOSIS — H93.13 TINNITUS OF BOTH EARS: Chronic | ICD-10-CM

## 2024-12-17 DIAGNOSIS — H90.3 SENSORINEURAL HEARING LOSS (SNHL) OF BOTH EARS: Chronic | ICD-10-CM

## 2024-12-17 DIAGNOSIS — H91.8X3 ASYMMETRICAL HEARING LOSS: Primary | Chronic | ICD-10-CM

## 2024-12-17 DIAGNOSIS — J31.0 CHRONIC RHINITIS: Chronic | ICD-10-CM

## 2024-12-17 PROCEDURE — 3044F HG A1C LEVEL LT 7.0%: CPT | Mod: CPTII,S$GLB,, | Performed by: OTOLARYNGOLOGY

## 2024-12-17 PROCEDURE — 92567 TYMPANOMETRY: CPT | Mod: S$GLB,,, | Performed by: AUDIOLOGIST

## 2024-12-17 PROCEDURE — 92557 COMPREHENSIVE HEARING TEST: CPT | Mod: S$GLB,,, | Performed by: AUDIOLOGIST

## 2024-12-17 PROCEDURE — 3074F SYST BP LT 130 MM HG: CPT | Mod: CPTII,S$GLB,, | Performed by: OTOLARYNGOLOGY

## 2024-12-17 PROCEDURE — 99999 PR PBB SHADOW E&M-EST. PATIENT-LVL I: CPT | Mod: PBBFAC,,, | Performed by: AUDIOLOGIST

## 2024-12-17 PROCEDURE — 3078F DIAST BP <80 MM HG: CPT | Mod: CPTII,S$GLB,, | Performed by: OTOLARYNGOLOGY

## 2024-12-17 PROCEDURE — 99999 PR PBB SHADOW E&M-EST. PATIENT-LVL III: CPT | Mod: PBBFAC,,, | Performed by: OTOLARYNGOLOGY

## 2024-12-17 PROCEDURE — 99204 OFFICE O/P NEW MOD 45 MIN: CPT | Mod: S$GLB,,, | Performed by: OTOLARYNGOLOGY

## 2024-12-17 PROCEDURE — 1159F MED LIST DOCD IN RCRD: CPT | Mod: CPTII,S$GLB,, | Performed by: OTOLARYNGOLOGY

## 2024-12-17 PROCEDURE — 3008F BODY MASS INDEX DOCD: CPT | Mod: CPTII,S$GLB,, | Performed by: OTOLARYNGOLOGY

## 2024-12-17 RX ORDER — UPADACITINIB 15 MG/1
TABLET, EXTENDED RELEASE ORAL
COMMUNITY
Start: 2024-10-17

## 2024-12-17 RX ORDER — CELECOXIB 200 MG/1
200 CAPSULE ORAL
COMMUNITY
Start: 2024-12-10

## 2024-12-17 NOTE — PATIENT INSTRUCTIONS
We reviewed the recent audiogram, and the fact that there is a distinct asymmetry of at least 30 dB across a few frequencies.  We discussed that as humans we are not entirely symmetric, and that many people have one ear that hears better than the other.  However, considering the degree of asymmetry, I would recommend and MRI of the IAC with contrast to evaluate for any retrocochlear lesions.  If that is normal, the patient will continue to follow with annual audiograms.      Try using OTC nasacort or flonase for intermittent nasal symptoms.  If this is not helping, we can discuss further options for treatment of nasal issues.     How do you use a Nasal Corticosteroid Spray?    Make sure you understand your dosing instructions. Spray only the number of prescribed sprays in each nostril. Read the package instructions before using your spray the first time.    Most corticosteroid sprays suggest the following steps:    Wash your hands well.    Gently blow your nose to clear the passageway.    Shake the container several times.    Tilt your head slightly downward.  Use the opposite hand from the nostril you will be spraying to hold the spray bottle.    Block one nostril with your finger.  Insert the nasal applicator into the other nostril.    Aim the spray toward the outer wall of the nostril.  Inhale slowly through the nose and press the .    Breathe out and repeat to apply the prescribed number of sprays.  Repeat these steps for the other nostril.     Avoid sneezing or blowing your nose right after spraying.            WHAT IS TINNITUS?  The perception of sound heard in one or both sides of the head. Some refer to it as a ringing, noise, buzzing, roaring, clicking, wooshing, crickets, heartbeat, music, or other noises. There are varying levels of severity. The tinnitus may be constant or periodic. Common complaints include difficulty sleeping, struggling to understand other's speech, depression, and  problems focusing.  Tinnitus is a symptom, not a disease. It affects 10-15% of adults in the [place].    WHAT CAN YOU DO?  You should seek medical care if you suspect you have tinnitus and tell your physician if your symptoms are affecting your daily life. Tinnitus may cause anxiety, depression, insomnia, negative emotions, and withdrawal. It's okay to seek help as your symptoms may be managed, and common.    HOW IS TINNITUS DIAGNOSED?  A doctor can diagnose tinnitus after a physical examination and interview. The examination may rule out conditions causing the tinnitus such as wax impaction or fluid behind the eardrum. Tinnitus may also be related to hearing loss, especially hearing loss from frequent noise exposure. A hearing test may be performed if you are experiencing tinnitus.    TREATMENT FOR TINNITUS?  Treatment may improve on its own but if it doesn't there are several ways to manage your symptoms although there is no cure. Possible treatment options include amplification (hearing aids), sound therapy (maskers,white noise,etc.), TMJ treatment, cognitive behavioral therapy, relaxation exercises, and managing your medications.  There is not enough evidence to suggest that over the counter products help patients with tinnitus. Acupuncture can neither be recommended or discouraged due to lack of evidence as well.    Source: American Academy of Otolaryngology-Head And Neck Surgery    ORGANIZATIONS AND LINKS FOR TINNITUS AND HEARING LOSS    American Academy of Audiology      www.audiology.org  American Tinnitus Associate        www.abdirizak.org  American Academy of Otolaryngology, Head & Neck Surgery www.entnet.org  American Auditory Society     www.amauditorysoc.org  Better Hearing [place]      www.betterhearing.org  EarHelp        www.earhelp.co.uk/  Hearing Education and Awareness for [place] (HEAR)  www.hearnet.com  Hearing Loss Association of [place]    www.hearingloss.com    Hear-It        www.hear-it.org  Healthy Hearing       www.[place]rive.[employer] [facility] [place]   Sight and Hearing Association     www.sightandhearing.org

## 2024-12-17 NOTE — PROGRESS NOTES
"Eve"Luis Choi III was seen today in the clinic for an audiologic evaluation.  His main complaint was decreased hearing sensitivity.  He reported having a recent audiogram at [facility] showing a left ear asymmetry and was referred to ENT for the asymmetry in hearing.  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    Tympanometry revealed a Type A tympanogram for both ears.  Audiogram results revealed a mild high frequency sensorineural hearing loss for the right ear and a severe high frequency sensorineural hearing loss for the left ear.  Speech reception thresholds were obtained at 0dB for both ears and speech discrimination scores were 92% for the right ear and 88% for the left ear.    Recommendations:  Otologic evaluation  Annual evaluation or sooner if needed  Hearing aid consult after medical clearance  Hearing protection in noise         "

## 2024-12-17 NOTE — PROGRESS NOTES
Chief Complaint   Patient presents with    Hearing Loss        HPI: Patient is a very pleasant 54 y.o. male here to see me today for the first time for evaluation of hearing loss.  He reports hearing loss that has been gradually progressing over the last few year(s).  Is more issues when he is in a crowd or with background noise.  He has not noted any difference in hearing between the ears, with both ears being the better hearing ear.  He has noted any tinnitus in both ears.  He has not had any recent issues with ear pain or ear drainage.  He has a family history of hearing loss, and has not had any previous otologic surgery.  He has any history of significant loud noise exposure- shooting- right handed.He denies issues with dizziness.    Patient also notes occasional issues with nasal congestion and inquires about whether or not he may have a deviated septum.              Past Medical History:   Diagnosis Date    Arthritis     Hypercholesteremia     Thyroid disease      Social History     Socioeconomic History    Marital status:    Tobacco Use    Smoking status: Never    Smokeless tobacco: Never   Substance and Sexual Activity    Alcohol use: Yes     Alcohol/week: 4.2 - 5.8 standard drinks of alcohol     Types: 5 - 7 Standard drinks or equivalent per week     Comment: couple times a week     Drug use: No    Sexual activity: Yes     Partners: Female     Social Drivers of Health     Financial Resource Strain: Low Risk  (1/18/2024)    Overall Financial Resource Strain (CARDIA)     Difficulty of Paying Living Expenses: Not hard at all   Food Insecurity: No Food Insecurity (3/4/2024)    Received from [facility]    Hunger Vital Sign     Worried About Running Out of Food in the Last Year: Never true     Ran Out of Food in the Last Year: Never true   Transportation Needs: No Transportation Needs (3/4/2024)    Received from [facility]    PRAPARE - Transportation     Lack of  Transportation (Medical): No     Lack of Transportation (Non-Medical): No   Physical Activity: Insufficiently Active (1/18/2024)    Exercise Vital Sign     Days of Exercise per Week: 2 days     Minutes of Exercise per Session: 50 min   Stress: Stress Concern Present (1/18/2024)    [employer] [place] [employer] - Occupational Stress Questionnaire     Feeling of Stress : To some extent   Housing Stability: Low Risk  (3/4/2024)    [facility]    Housing Stability Vital Sign     Unable to Pay for Housing in the Last Year: No     Number of Places Lived in the Last Year: 1     Unstable Housing in the Last Year: No     Past Surgical History:   Procedure Laterality Date    COLONOSCOPY      COLONOSCOPY N/A 3/25/2021    Procedure: COLONOSCOPY;  Surgeon: Jd Bundy MD;  [facility] ([address] [place] [address]);  Service: Endoscopy;  Laterality: N/A;  covid test 3/22 primary care, instructions emailed-[facility]    HAND SURGERY  1990    right    KNEE CARTILAGE SURGERY  1993    5 procedures starting in 1985     Family History   Problem Relation Name Age of Onset    Cancer Mother          bladder, ovarian, melanoma, colon    Coronary artery disease Father      Cancer Maternal Grandmother      Coronary artery disease Paternal Grandmother      Coronary artery disease Paternal Grandfather      Cancer Maternal Uncle          colon cancer     Cancer Maternal Aunt          ovarian, ?    Cancer Maternal Uncle          stomache, prostate           Review of Systems  General: negative for chills, fever or weight loss  Psychological: negative for mood changes or depression  Ophthalmic: negative for blurry vision, photophobia or eye pain  ENT: see HPI  Respiratory: no cough, shortness of breath, or wheezing  Cardiovascular: no chest pain or dyspnea on exertion  Gastrointestinal: no abdominal pain, change in bowel habits, or black/ bloody stools  Musculoskeletal: negative for gait disturbance or muscular  weakness  Neurological: no syncope or seizures; no ataxia  Dermatological: negative for pruritis,  rash and jaundice  Hematologic/lymphatic: no easy bruising, no new adenopathy      Physical Exam:    Vitals:    12/17/24 0848   BP: 110/75   Pulse: 87         Constitutional:   He is oriented to person, place, and time. Vital signs are normal. He appears well-developed and well-nourished. He appears alert. He is cooperative.  Non-toxic appearance. Normal speech.      Head:  Normocephalic and atraumatic. Salivary glands normal.  Facial strength is normal.      Ears:    Right Ear: Tympanic membrane is not perforated, not erythematous and not retracted. No middle ear effusion.   Left Ear: Tympanic membrane is not perforated, not erythematous and not retracted.  No middle ear effusion.     Nose:  Mucosal edema present. No rhinorrhea, septal deviation (no significant septal deviation noted anteriorly), nasal septal hematoma or polyps. No epistaxis. No turbinate masses and no turbinate hypertrophy (2+ size).  Right sinus exhibits no maxillary sinus tenderness and no frontal sinus tenderness. Left sinus exhibits no maxillary sinus tenderness and no frontal sinus tenderness.     Mouth/Throat  Oropharynx clear and moist without lesions or asymmetry, normal uvula midline, lips, teeth, and gums normal and oropharynx normal. Normal dentition. No uvula swelling or oral lesions. No oropharyngeal exudate, posterior oropharyngeal edema or posterior oropharyngeal erythema. Mirror exam not performed due to patient tolerance.  Mirror exam not performed due to patient tolerance.      Neck:  Neck normal without thyromegaly masses, asymmetry, normal tracheal structure, crepitus, and tenderness, thyroid normal, trachea normal, full range of motion with neck supple and no adenopathy. No JVD present. Carotid bruit is not present. Thyroid tenderness is present. No edema and no erythema present. No thyroid mass and no thyromegaly present.     He  has no cervical adenopathy.     Cardiovascular:    Normal rate, regular rhythm, normal heart sounds and rate and rhythm, heart sounds, and pulses normal.              Pulmonary/Chest:   Effort and breath sounds normal.     Psychiatric:   He has a normal mood and affect. His speech is normal and behavior is normal.     Neurological:   He is alert and oriented to person, place, and time. He has neurological normal, alert and oriented. No cranial nerve deficit.     Skin:   No abrasions, lacerations, lesions, or rashes.         Audiogram: Interpreted by me and reviewed with the patient today.  Sensorineural hearing loss noted high frequencies, asymmetric with left side being much worse than right.  Tympanograms type a bilaterally.        Assessment:    ICD-10-CM ICD-9-CM    1. Asymmetrical hearing loss  H91.8X3 389.9 MRI IAC/Temporal Bones W W/O Contrast      2. Tinnitus of both ears  H93.13 388.30       3. Chronic rhinitis  J31.0 472.0       4. Sensorineural hearing loss (SNHL) of both ears  H90.3 389.18         The primary encounter diagnosis was Asymmetrical hearing loss. Diagnoses of Tinnitus of both ears, Chronic rhinitis, and Sensorineural hearing loss (SNHL) of both ears were also pertinent to this visit.      Plan:  We reviewed the recent audiogram, and the fact that there is a distinct asymmetry of at least 30 dB across a few frequencies.  We discussed that as humans we are not entirely symmetric, and that many people have one ear that hears better than the other.  However, considering the degree of asymmetry, I would recommend and MRI of the IAC with contrast to evaluate for any retrocochlear lesions.  If that is normal, the patient will continue to follow with annual audiograms.    Patient was given information on tinnitus and coping strategies.    Try using OTC nasacort or flonase for intermittent nasal symptoms.  If this is not helping, we can discuss further options for treatment of nasal issues.      Follow-up in 1 year sooner if needed.    Lenora Trinh MD

## 2024-12-26 ENCOUNTER — HOSPITAL ENCOUNTER (OUTPATIENT)
Dept: RADIOLOGY | Facility: HOSPITAL | Age: 54
Discharge: HOME | End: 2024-12-26
Attending: OTOLARYNGOLOGY
Payer: COMMERCIAL

## 2024-12-26 ENCOUNTER — TELEPHONE (OUTPATIENT)
Dept: OTOLARYNGOLOGY | Facility: CLINIC | Age: 54
End: 2024-12-26
Payer: COMMERCIAL

## 2024-12-26 DIAGNOSIS — H91.8X3 ASYMMETRICAL HEARING LOSS: Chronic | ICD-10-CM

## 2024-12-26 PROCEDURE — 70553 MRI BRAIN STEM W/O & W/DYE: CPT | Mod: 26,,, | Performed by: STUDENT IN AN ORGANIZED HEALTH CARE EDUCATION/TRAINING PROGRAM

## 2024-12-26 PROCEDURE — 25500020 PHARM REV CODE 255: Performed by: OTOLARYNGOLOGY

## 2024-12-26 PROCEDURE — 70553 MRI BRAIN STEM W/O & W/DYE: CPT | Mod: TC

## 2024-12-26 PROCEDURE — A9585 GADOBUTROL INJECTION: HCPCS | Performed by: OTOLARYNGOLOGY

## 2024-12-26 RX ORDER — GADOBUTROL 604.72 MG/ML
10 INJECTION INTRAVENOUS
Status: COMPLETED | OUTPATIENT
Start: 2024-12-26 | End: 2024-12-26

## 2024-12-26 RX ADMIN — GADOBUTROL 10 ML: 604.72 INJECTION INTRAVENOUS at 03:12

## 2024-12-26 NOTE — PROGRESS NOTES
MRI studies reviewed and is without evidence of abnormality of the 7th or 8th cranial nerves.  No evidence of intracranial pathology.  Negative MRI.    Patient should continue to follow-up with annual audiograms to monitor hearing loss.    Lenora Trinh MD  [facility]

## 2024-12-26 NOTE — TELEPHONE ENCOUNTER
Inform pt of his MRI results as noted by Dr Trinh , pt gives verbal understanding and said thanks.  Aline

## 2025-01-07 ENCOUNTER — HOSPITAL ENCOUNTER (OUTPATIENT)
Dept: CARDIOLOGY | Facility: CLINIC | Age: 55
Discharge: HOME | End: 2025-01-07
Attending: FAMILY MEDICINE
Payer: COMMERCIAL

## 2025-01-07 ENCOUNTER — CLINICAL SUPPORT (OUTPATIENT)
Dept: INTERNAL MEDICINE | Facility: CLINIC | Age: 55
End: 2025-01-07
Attending: FAMILY MEDICINE
Payer: COMMERCIAL

## 2025-01-07 VITALS
WEIGHT: 252.44 LBS | SYSTOLIC BLOOD PRESSURE: 110 MMHG | BODY MASS INDEX: 34.19 KG/M2 | HEART RATE: 91 BPM | HEIGHT: 72 IN | OXYGEN SATURATION: 97 % | DIASTOLIC BLOOD PRESSURE: 72 MMHG

## 2025-01-07 DIAGNOSIS — E78.5 HYPERLIPIDEMIA, UNSPECIFIED HYPERLIPIDEMIA TYPE: ICD-10-CM

## 2025-01-07 DIAGNOSIS — F40.248 FEAR OF PUBLIC SPEAKING: ICD-10-CM

## 2025-01-07 DIAGNOSIS — Z00.00 ROUTINE MEDICAL EXAM: ICD-10-CM

## 2025-01-07 DIAGNOSIS — Z00.00 ANNUAL PHYSICAL EXAM: Primary | ICD-10-CM

## 2025-01-07 DIAGNOSIS — L40.59 OTHER PSORIATIC ARTHROPATHY: ICD-10-CM

## 2025-01-07 DIAGNOSIS — Z00.00 ROUTINE GENERAL MEDICAL EXAMINATION AT A HEALTH CARE FACILITY: Primary | ICD-10-CM

## 2025-01-07 DIAGNOSIS — E03.9 HYPOTHYROIDISM (ACQUIRED): ICD-10-CM

## 2025-01-07 DIAGNOSIS — G47.00 INSOMNIA, UNSPECIFIED TYPE: ICD-10-CM

## 2025-01-07 PROBLEM — R63.5 WEIGHT GAIN: Status: RESOLVED | Noted: 2022-10-04 | Resolved: 2025-01-07

## 2025-01-07 PROBLEM — Z96.652 S/P TOTAL KNEE ARTHROPLASTY, LEFT: Status: ACTIVE | Noted: 2024-03-04

## 2025-01-07 PROBLEM — Z01.818 PREOPERATIVE CLEARANCE: Status: RESOLVED | Noted: 2024-01-23 | Resolved: 2025-01-07

## 2025-01-07 LAB
ALBUMIN SERPL BCP-MCNC: 3.9 G/DL (ref 3.5–5.2)
ALP SERPL-CCNC: 45 U/L (ref 40–150)
ALT SERPL W/O P-5'-P-CCNC: 27 U/L (ref 10–44)
ANION GAP SERPL CALC-SCNC: 7 MMOL/L (ref 8–16)
AST SERPL-CCNC: 26 U/L (ref 10–40)
BILIRUB SERPL-MCNC: 0.5 MG/DL (ref 0.1–1)
BUN SERPL-MCNC: 14 MG/DL (ref 6–20)
CALCIUM SERPL-MCNC: 9.4 MG/DL (ref 8.7–10.5)
CHLORIDE SERPL-SCNC: 112 MMOL/L (ref 95–110)
CHOLEST SERPL-MCNC: 172 MG/DL (ref 120–199)
CHOLEST/HDLC SERPL: 3.2 {RATIO} (ref 2–5)
CO2 SERPL-SCNC: 22 MMOL/L (ref 23–29)
COMPLEXED PSA SERPL-MCNC: 0.39 NG/ML (ref 0–4)
CREAT SERPL-MCNC: 1.1 MG/DL (ref 0.5–1.4)
ERYTHROCYTE [DISTWIDTH] IN BLOOD BY AUTOMATED COUNT: 13 % (ref 11.5–14.5)
EST. GFR  (NO RACE VARIABLE): >60 ML/MIN/1.73 M^2
ESTIMATED AVG GLUCOSE: 100 MG/DL (ref 68–131)
GLUCOSE SERPL-MCNC: 96 MG/DL (ref 70–110)
HBA1C MFR BLD: 5.1 % (ref 4–5.6)
HCT VFR BLD AUTO: 38.7 % (ref 40–54)
HDLC SERPL-MCNC: 54 MG/DL (ref 40–75)
HDLC SERPL: 31.4 % (ref 20–50)
HGB BLD-MCNC: 13.8 G/DL (ref 14–18)
LDLC SERPL CALC-MCNC: 50 MG/DL (ref 63–159)
MCH RBC QN AUTO: 32.2 PG (ref 27–31)
MCHC RBC AUTO-ENTMCNC: 35.7 G/DL (ref 32–36)
MCV RBC AUTO: 90 FL (ref 82–98)
NONHDLC SERPL-MCNC: 118 MG/DL
OHS QRS DURATION: 96 MS
OHS QTC CALCULATION: 441 MS
PLATELET # BLD AUTO: 332 K/UL (ref 150–450)
PMV BLD AUTO: 9.5 FL (ref 9.2–12.9)
POTASSIUM SERPL-SCNC: 4.2 MMOL/L (ref 3.5–5.1)
PROT SERPL-MCNC: 7.3 G/DL (ref 6–8.4)
RBC # BLD AUTO: 4.29 M/UL (ref 4.6–6.2)
SODIUM SERPL-SCNC: 141 MMOL/L (ref 136–145)
TRIGL SERPL-MCNC: 340 MG/DL (ref 30–150)
TSH SERPL DL<=0.005 MIU/L-ACNC: 2.26 UIU/ML (ref 0.4–4)
WBC # BLD AUTO: 12.53 K/UL (ref 3.9–12.7)

## 2025-01-07 PROCEDURE — 85027 COMPLETE CBC AUTOMATED: CPT | Performed by: FAMILY MEDICINE

## 2025-01-07 PROCEDURE — 84443 ASSAY THYROID STIM HORMONE: CPT | Performed by: FAMILY MEDICINE

## 2025-01-07 PROCEDURE — 80053 COMPREHEN METABOLIC PANEL: CPT | Performed by: FAMILY MEDICINE

## 2025-01-07 PROCEDURE — 3044F HG A1C LEVEL LT 7.0%: CPT | Mod: CPTII,S$GLB,, | Performed by: FAMILY MEDICINE

## 2025-01-07 PROCEDURE — 99999 PR PBB SHADOW E&M-EST. PATIENT-LVL I: CPT | Mod: PBBFAC,,,

## 2025-01-07 PROCEDURE — 83036 HEMOGLOBIN GLYCOSYLATED A1C: CPT | Performed by: FAMILY MEDICINE

## 2025-01-07 PROCEDURE — 1160F RVW MEDS BY RX/DR IN RCRD: CPT | Mod: CPTII,S$GLB,, | Performed by: FAMILY MEDICINE

## 2025-01-07 PROCEDURE — 84153 ASSAY OF PSA TOTAL: CPT | Performed by: FAMILY MEDICINE

## 2025-01-07 PROCEDURE — 3078F DIAST BP <80 MM HG: CPT | Mod: CPTII,S$GLB,, | Performed by: FAMILY MEDICINE

## 2025-01-07 PROCEDURE — 3008F BODY MASS INDEX DOCD: CPT | Mod: CPTII,S$GLB,, | Performed by: FAMILY MEDICINE

## 2025-01-07 PROCEDURE — 99999 PR PBB SHADOW E&M-EST. PATIENT-LVL V: CPT | Mod: PBBFAC,,, | Performed by: FAMILY MEDICINE

## 2025-01-07 PROCEDURE — 99396 PREV VISIT EST AGE 40-64: CPT | Mod: S$GLB,,, | Performed by: FAMILY MEDICINE

## 2025-01-07 PROCEDURE — 93010 ELECTROCARDIOGRAM REPORT: CPT | Mod: S$GLB,,, | Performed by: INTERNAL MEDICINE

## 2025-01-07 PROCEDURE — 1159F MED LIST DOCD IN RCRD: CPT | Mod: CPTII,S$GLB,, | Performed by: FAMILY MEDICINE

## 2025-01-07 PROCEDURE — 80061 LIPID PANEL: CPT | Performed by: FAMILY MEDICINE

## 2025-01-07 PROCEDURE — 3074F SYST BP LT 130 MM HG: CPT | Mod: CPTII,S$GLB,, | Performed by: FAMILY MEDICINE

## 2025-01-07 PROCEDURE — 93005 ELECTROCARDIOGRAM TRACING: CPT | Mod: S$GLB,,, | Performed by: FAMILY MEDICINE

## 2025-01-07 RX ORDER — PROPRANOLOL HYDROCHLORIDE 10 MG/1
10 TABLET ORAL DAILY PRN
Qty: 30 TABLET | Refills: 0 | Status: SHIPPED | OUTPATIENT
Start: 2025-01-07

## 2025-01-07 RX ORDER — ROSUVASTATIN CALCIUM 10 MG/1
10 TABLET, COATED ORAL NIGHTLY
Qty: 90 TABLET | Refills: 3 | Status: SHIPPED | OUTPATIENT
Start: 2025-01-07

## 2025-01-07 RX ORDER — PROPRANOLOL HYDROCHLORIDE 10 MG/1
TABLET ORAL
Qty: 90 TABLET | OUTPATIENT
Start: 2025-01-07

## 2025-01-07 RX ORDER — ZOLPIDEM TARTRATE 6.25 MG/1
6.25 TABLET, FILM COATED, EXTENDED RELEASE ORAL NIGHTLY PRN
Qty: 30 TABLET | Refills: 1 | Status: SHIPPED | OUTPATIENT
Start: 2025-01-07

## 2025-01-07 NOTE — PROGRESS NOTES
Subjective:       Patient ID: Eve Choi III is a 54 y.o. male.    Chief Complaint: Executive Health    Established patient for an annual wellness check/physical exam and also chronic disease management. Specific complaints - see dictation, M*model entries and please see ROS.  Past, Surgical, Family, Social Histories; Medications, Allergies reviewed and reconciled.  Health maintenance file reviewed and addressed items due. Recent applicable lab, imaging and cardiovascular results reviewed.  Problem list items reviewed and modified or added entries (in the overview section) may not be transcribed into this encounter note due to note writer format.      Some medication changes from last year through outside rheumatology.    LTKA march.    Some difficulty with sleeping reported states he would like to get a sleep study.      Review of Systems   Constitutional:  Negative for appetite change, chills, diaphoresis, fatigue and fever.   HENT:  Negative for congestion, postnasal drip, rhinorrhea, sore throat and trouble swallowing.    Eyes:  Negative for visual disturbance.   Respiratory:  Negative for cough, choking, chest tightness, shortness of breath and wheezing.    Cardiovascular:  Negative for chest pain and leg swelling.   Gastrointestinal:  Negative for abdominal distention, abdominal pain, diarrhea, nausea and vomiting.   Genitourinary:  Negative for difficulty urinating and hematuria.   Musculoskeletal:  Positive for back pain. Negative for arthralgias and myalgias.        -L flank and back pain, saw urology month ago  -CT URO nonobstr kidney stones   Skin:  Negative for rash.   Neurological:  Negative for weakness, light-headedness and headaches.   Psychiatric/Behavioral:  Positive for sleep disturbance. Negative for confusion and dysphoric mood.        Objective:      Physical Exam  Vitals and nursing note reviewed.   Constitutional:       Appearance: He is well-developed. He is obese. He is not  diaphoretic.   Eyes:      General: No scleral icterus.  Neck:      Thyroid: No thyromegaly.      Vascular: No JVD.      Trachea: No tracheal deviation.   Cardiovascular:      Rate and Rhythm: Normal rate and regular rhythm.      Heart sounds: Normal heart sounds. No murmur heard.     No friction rub. No gallop.   Pulmonary:      Effort: Pulmonary effort is normal. No respiratory distress.      Breath sounds: Normal breath sounds. No wheezing or rales.   Abdominal:      General: There is no distension.      Palpations: Abdomen is soft. There is no mass.      Tenderness: There is no abdominal tenderness. There is no guarding or rebound.   Musculoskeletal:      Cervical back: Normal range of motion and neck supple.   Lymphadenopathy:      Cervical: No cervical adenopathy.   Skin:     General: Skin is warm and dry.      Findings: No erythema or rash.   Neurological:      Mental Status: He is alert and oriented to person, place, and time.      Cranial Nerves: No cranial nerve deficit.      Motor: No tremor.      Coordination: Coordination normal.      Gait: Gait normal.   Psychiatric:         Behavior: Behavior normal.         Thought Content: Thought content normal.         Judgment: Judgment normal.       Assessment:       1. Annual physical exam    2. Other psoriatic arthropathy    3. Insomnia, unspecified type    4. Hypothyroidism (acquired)    5. Fear of public speaking    6. Hyperlipidemia, unspecified hyperlipidemia type        Plan:     Medication List with Changes/Refills   Current Medications    CELECOXIB (CELEBREX) 200 MG CAPSULE    Take 200 mg by mouth.    LEVOTHYROXINE (SYNTHROID) 50 MCG TABLET    Take 1 tablet (50 mcg total) by mouth once daily.    MULTIVITAMIN CAPSULE    Take 1 capsule by mouth once daily.    RINVOQ 15 MG 24 HR TABLET        TIRZEPATIDE, WEIGHT LOSS, 10 MG/0.5 ML PNIJ    Inject 10 mg into the skin every 7 days.   Changed and/or Refilled Medications    Modified Medication Previous  Medication    PROPRANOLOL (INDERAL) 10 MG TABLET propranoloL (INDERAL) 10 MG tablet       Take 1 tablet (10 mg total) by mouth daily as needed (speaking anxiety).    TAKE 1 TABLET BY MOUTH EVERY DAY AS NEEDED FOR PUBLIC SPEAKING    ROSUVASTATIN (CRESTOR) 10 MG TABLET rosuvastatin (CRESTOR) 10 MG tablet       Take 1 tablet (10 mg total) by mouth every evening.    Take 1 tablet (10 mg total) by mouth every evening.    ZOLPIDEM (AMBIEN CR) 6.25 MG CR TABLET zolpidem (AMBIEN CR) 6.25 MG CR tablet       Take 1 tablet (6.25 mg total) by mouth nightly as needed for Insomnia.    Take 1 tablet (6.25 mg total) by mouth nightly as needed for Insomnia.   Discontinued Medications    MELOXICAM (MOBIC) 15 MG TABLET    TAKE 1 TABLET(15 MG) BY MOUTH EVERY DAY     1. Annual physical exam    2. Other psoriatic arthropathy  Overview:  -followed by Dr. Fernandes locally, has been evaluated @ [facility] - [place], psoriatic arthritis. Records NA.      3. Insomnia, unspecified type  Overview:  -occasional, states uses ambien cr twice a month    Orders:  -     Ambulatory referral/consult to Sleep Disorders; Future; Expected date: 01/14/2025  -     zolpidem (AMBIEN CR) 6.25 MG CR tablet; Take 1 tablet (6.25 mg total) by mouth nightly as needed for Insomnia.  Dispense: 30 tablet; Refill: 1    4. Hypothyroidism (acquired)  Overview:  -diagnosed many years ago, treatment initiation data not available  -seen by endocrinologist 10/4/2022    Orders:  -     Ambulatory referral/consult to Endocrinology; Future; Expected date: 01/14/2025    5. Fear of public speaking  -     propranoloL (INDERAL) 10 MG tablet; Take 1 tablet (10 mg total) by mouth daily as needed (speaking anxiety).  Dispense: 30 tablet; Refill: 0    6. Hyperlipidemia, unspecified hyperlipidemia type  -     rosuvastatin (CRESTOR) 10 MG tablet; Take 1 tablet (10 mg total) by mouth every evening.  Dispense: 90 tablet; Refill: 3      See meds, orders, follow up, routing and instructions  sections of encounter and AVS. Discussed with patient and provided on AVS.    Discussed diet and exercise as therapeutic modalities for metabolic and other conditions. Provided patient information, which are included as links on the AVS for detailed information.    Lab Results   Component Value Date     01/07/2025    K 4.2 01/07/2025     (H) 01/07/2025    BUN 14 01/07/2025    CREATININE 1.1 01/07/2025    GLU 96 01/07/2025    HGBA1C 5.1 01/07/2025    AST 26 01/07/2025    ALT 27 01/07/2025    ALBUMIN 3.9 01/07/2025    PROT 7.3 01/07/2025    BILITOT 0.5 01/07/2025    CHOL 172 01/07/2025    HDL 54 01/07/2025    LDLCALC 50.0 (L) 01/07/2025    TRIG 340 (H) 01/07/2025    WBC 12.53 01/07/2025    HGB 13.8 (L) 01/07/2025    HCT 38.7 (L) 01/07/2025     01/07/2025    PSA 0.39 01/07/2025    TSH 3.997 01/23/2024    URICACID 6.2 01/11/2018       EKG nonspecific changes, no change from last.    ETT 2024, NATALY 2022.

## 2025-01-07 NOTE — TELEPHONE ENCOUNTER
Refill Decision Note   Eve Choi  is requesting a refill authorization.  Brief Assessment and Rationale for Refill:  Quick Discontinue     Medication Therapy Plan:  Receipt confirmed by pharmacy (1/7/2025  9:01 AM CST      Comments:     Note composed:2:19 PM 01/07/2025

## 2025-01-07 NOTE — TELEPHONE ENCOUNTER
No care due was identified.  [facility] Embedded Care Due Messages. Reference number: [id number].   1/07/2025 9:13:24 AM CST

## 2025-01-08 ENCOUNTER — TELEPHONE (OUTPATIENT)
Dept: ENDOCRINOLOGY | Facility: CLINIC | Age: 55
End: 2025-01-08
Payer: COMMERCIAL

## 2025-01-14 ENCOUNTER — OFFICE VISIT (OUTPATIENT)
Dept: SLEEP MEDICINE | Facility: CLINIC | Age: 55
End: 2025-01-14
Payer: COMMERCIAL

## 2025-01-14 VITALS
SYSTOLIC BLOOD PRESSURE: 102 MMHG | WEIGHT: 253.88 LBS | DIASTOLIC BLOOD PRESSURE: 72 MMHG | HEART RATE: 92 BPM | BODY MASS INDEX: 34.39 KG/M2 | HEIGHT: 72 IN

## 2025-01-14 DIAGNOSIS — G47.00 INSOMNIA, UNSPECIFIED TYPE: ICD-10-CM

## 2025-01-14 PROCEDURE — 99204 OFFICE O/P NEW MOD 45 MIN: CPT | Mod: S$GLB,,, | Performed by: NURSE PRACTITIONER

## 2025-01-14 PROCEDURE — 99999 PR PBB SHADOW E&M-EST. PATIENT-LVL IV: CPT | Mod: PBBFAC,,, | Performed by: NURSE PRACTITIONER

## 2025-01-14 PROCEDURE — 3044F HG A1C LEVEL LT 7.0%: CPT | Mod: CPTII,S$GLB,, | Performed by: NURSE PRACTITIONER

## 2025-01-14 PROCEDURE — 1159F MED LIST DOCD IN RCRD: CPT | Mod: CPTII,S$GLB,, | Performed by: NURSE PRACTITIONER

## 2025-01-14 PROCEDURE — 3078F DIAST BP <80 MM HG: CPT | Mod: CPTII,S$GLB,, | Performed by: NURSE PRACTITIONER

## 2025-01-14 PROCEDURE — 3074F SYST BP LT 130 MM HG: CPT | Mod: CPTII,S$GLB,, | Performed by: NURSE PRACTITIONER

## 2025-01-14 NOTE — PROGRESS NOTES
Referred by Mega Argueta MD     NEW PATIENT VISIT    Eve Choi III  is a pleasant 54 y.o. male who presents in the Fall of 2024 for sleep evaluation.    See assessment below for further history.    Past Medical History:   Diagnosis Date    Arthritis     Hypercholesteremia     Thyroid disease      Patient Active Problem List   Diagnosis    Tear of lateral cartilage or meniscus of knee, current    Encounter for screening colonoscopy    Hyperlipidemia    Hypothyroidism (acquired)    Gastroesophageal reflux disease    Family history of colon cancer    Family history stomach cancer    Family history of prostate cancer    Seronegative arthritis    Aortic atherosclerosis    Insomnia    Psoriatic arthropathy    S/P total knee arthroplasty, left     Current Outpatient Medications:     celecoxib (CELEBREX) 200 MG capsule, Take 200 mg by mouth., Disp: , Rfl:     levothyroxine (SYNTHROID) 50 MCG tablet, Take 1 tablet (50 mcg total) by mouth once daily., Disp: 30 tablet, Rfl: 11    multivitamin capsule, Take 1 capsule by mouth once daily., Disp: , Rfl:     propranoloL (INDERAL) 10 MG tablet, Take 1 tablet (10 mg total) by mouth daily as needed (speaking anxiety)., Disp: 30 tablet, Rfl: 0    RINVOQ 15 mg 24 hr tablet, , Disp: , Rfl:     rosuvastatin (CRESTOR) 10 MG tablet, Take 1 tablet (10 mg total) by mouth every evening., Disp: 90 tablet, Rfl: 3    tirzepatide, weight loss, 10 mg/0.5 mL PnIj, Inject 10 mg into the skin every 7 days., Disp: , Rfl:     zolpidem (AMBIEN CR) 6.25 MG CR tablet, Take 1 tablet (6.25 mg total) by mouth nightly as needed for Insomnia., Disp: 30 tablet, Rfl: 1      Vitals:    01/14/25 0935   BP: 102/72   BP Location: Left arm   Patient Position: Sitting   Pulse: 92   Weight: 115.2 kg (253 lb 13.8 oz)   Height: 6' (1.829 m)     Physical Exam:    GEN:   Well-appearing  Psych:  Appropriate affect, demonstrates insight  SKIN:  No rash on the face or bridge of the nose      LABS:   Lab Results    Component Value Date    CO2 22 (L) 01/07/2025         No echocardiogram results found for the past 12 months     Lab Results   Component Value Date    FERRITIN 296 01/23/2023       RECORDS REVIEWED:      ASSESSMENT    Sig PMH:  PROBLEM DESCRIPTION/ Sx on Presentation  STATUS PLAN     Screening for  MARIMAR   Presentation:      (PCP) referred him for poor sleep.    no - snoring  no - gasping arousals/coughing  no - witnessed apneas, pauses in breathing    no - night sweats    rare - AM headaches    no - dry mouth/sore throat      new   -we discussed sleep testing to evaluate for MARIMAR     -discussed possible treatments for MARIMAR including CPAP therapy    -offered sleep study, deferred at this time.     Daytime symptoms       Does not feel refreshed when waking up in the morning. Rarely feels excessively sleepy during the day and/or periods of rest.     ESS 2/24 on intake    new   -will reassess sleepiness after evaluation for MARIMAR     Insomnia       Has not slept well for many years. Has issues staying asleep; will wake up several times a night and have trouble falling back to sleep.    Occasionally takes ambien to catch up on sleep (only once a month).    Used to travel extensively for work and had lots of early flights, slept in hotels, irregular schedule.    Had chronic knee pain that used to wake him up overnight, but knee pain is better now.      no - difficulty with sleep onset    yes - difficulty with sleep maintenance    SLEEP SCHEDULE   Duration    Wind- down    Envmnt    CBTi    Meds prior [employer]   Meds now Ambien 6.25  (takes half tablet) PRN   Bed Time 930-1030pm (work)  11pm-12am (off)   Lights out    Latency 15-30min   Arousals 1-2   Back to sleep 1-2 hrs   Stim. ctrl    Wake time 6-630am (work)  7 am (off)   Caffeine    Naps zero.  Wish I took more.   Nocturia 1   Work                      new   -will reassess after evaluation for sleep-disordered breathing  -we discussed CHRONIC  INSOMNIA  RECOMMENDATIONS:  WIND-DOWN: try to establish a wind-down routine    PHONE: turn off notifications and keep phone out of the bedroom   CAFFEINE: avoid caffeine within 12-16 hours of bed time  ALCOHOL: avoid alcohol in the evening  -LIMIT TIB: limit time in bed to no more than 8 hours    -STIMULUS CONTROL: avoid being in bed and frustrated  -WAKE TIME: keep a consistent wake time even on days off   -BED TIME: establish a regular bed time    -consider formal Cognitive Behavior Therapy for insomnia (CBTi)       Nocturia     x 1 per sleep period    new          RTC:  will arrange RTC depending on results of sleep testing         PLAN      -recommend sleep testing   -HST ordered  -discussed trial therapy if MARIMAR present and the patient is open to a trial of CPAP therapy  -discussed the etiology of obstructive sleep apnea as well as the potential ramifications of untreated sleep apnea, which could include daytime sleepiness, hypertension, heart disease and/or stroke. We discussed potential treatment options, which could include weight loss, body positioning, continuous positive airway pressure (CPAP), oral appliance, Inspire, or referral for surgical consideration.        Advised on plan of care. Answered all patient questions. Patient verbalized understanding and voiced agreement with plan of care.

## 2025-02-19 ENCOUNTER — PATIENT MESSAGE (OUTPATIENT)
Dept: INTERNAL MEDICINE | Facility: CLINIC | Age: 55
End: 2025-02-19
Payer: COMMERCIAL

## 2025-02-19 DIAGNOSIS — D46.9: ICD-10-CM

## 2025-02-19 DIAGNOSIS — M13.80 SERONEGATIVE ARTHRITIS: Primary | ICD-10-CM

## 2025-02-20 NOTE — TELEPHONE ENCOUNTER
Message sent to rheumatology and appointment has been scheduled  Oncologist has already contacted patient in regards to getting patient scheduled they are working on it

## 2025-02-20 NOTE — TELEPHONE ENCOUNTER
Please see referral orders and please call patient to schedule a consult with Rheumatology and Hematology.  Patient reports outside lab tests of ' preleukemia' in the setting of outside rheumatology treatment for seronegative rheumatism. Thank you.

## 2025-02-21 ENCOUNTER — PATIENT MESSAGE (OUTPATIENT)
Dept: INTERNAL MEDICINE | Facility: CLINIC | Age: 55
End: 2025-02-21
Payer: COMMERCIAL

## 2025-02-24 ENCOUNTER — TELEPHONE (OUTPATIENT)
Dept: HEMATOLOGY/ONCOLOGY | Facility: CLINIC | Age: 55
End: 2025-02-24
Payer: COMMERCIAL

## 2025-02-24 NOTE — TELEPHONE ENCOUNTER
Called and spoke to Patient .  Appointment scheduled on 3/5 with Dr Smith.  All questions and concerns addressed.   Provided my name and direct number and instructed Patient  to call with any questions and/or concerns.       BRUCE DemarcoN, RN-BC  BMT Nurse Navigator  [facility] [address], [place], [place] [address]  _________________________  o [phone]

## 2025-02-24 NOTE — TELEPHONE ENCOUNTER
----- Message from Cameron Chan sent at 2/20/2025 10:01 AM CST -----  Verified medical and transplant benefit for patient for Juan Miguel Miles  ----- Message -----  From: Steve Salcedo RN  Sent: 2/20/2025   9:46 AM CST  To: Cameron Chan    Please verify car t tx benefits

## 2025-02-24 NOTE — NURSING
Oncology Navigation   Intake  Cancer Type: Lymphoma  Type of Referral: External  Date of Referral: 02/20/25  Initial Nurse Navigator Contact: 02/24/25  Referral to Initial Contact Timeline (days): 4  First Appointment Available: 03/05/25  Appointment Date: 03/05/25  First Available Date vs. Scheduled Date (days): 0     Treatment                              Acuity      Follow Up  No follow-ups on file.

## 2025-03-05 ENCOUNTER — OFFICE VISIT (OUTPATIENT)
Dept: HEMATOLOGY/ONCOLOGY | Facility: CLINIC | Age: 55
End: 2025-03-05
Payer: COMMERCIAL

## 2025-03-05 ENCOUNTER — LAB VISIT (OUTPATIENT)
Dept: LAB | Facility: HOSPITAL | Age: 55
End: 2025-03-05
Payer: COMMERCIAL

## 2025-03-05 VITALS
TEMPERATURE: 98 F | RESPIRATION RATE: 18 BRPM | BODY MASS INDEX: 34.12 KG/M2 | HEART RATE: 75 BPM | OXYGEN SATURATION: 98 % | DIASTOLIC BLOOD PRESSURE: 79 MMHG | HEIGHT: 72 IN | WEIGHT: 251.88 LBS | SYSTOLIC BLOOD PRESSURE: 135 MMHG

## 2025-03-05 DIAGNOSIS — D47.9 LYMPHOPROLIFERATIVE DISORDER: Primary | ICD-10-CM

## 2025-03-05 DIAGNOSIS — D47.9 LYMPHOPROLIFERATIVE DISORDER: ICD-10-CM

## 2025-03-05 DIAGNOSIS — M13.80 SERONEGATIVE ARTHRITIS: ICD-10-CM

## 2025-03-05 DIAGNOSIS — D46.9: ICD-10-CM

## 2025-03-05 LAB
ALBUMIN SERPL BCP-MCNC: 3.8 G/DL (ref 3.5–5.2)
ALP SERPL-CCNC: 48 U/L (ref 40–150)
ALT SERPL W/O P-5'-P-CCNC: 24 U/L (ref 10–44)
ANION GAP SERPL CALC-SCNC: 7 MMOL/L (ref 8–16)
AST SERPL-CCNC: 37 U/L (ref 10–40)
BASOPHILS # BLD AUTO: 0.03 K/UL (ref 0–0.2)
BASOPHILS NFR BLD: 0.3 % (ref 0–1.9)
BILIRUB SERPL-MCNC: 0.5 MG/DL (ref 0.1–1)
BUN SERPL-MCNC: 16 MG/DL (ref 6–20)
CALCIUM SERPL-MCNC: 8.9 MG/DL (ref 8.7–10.5)
CHLORIDE SERPL-SCNC: 107 MMOL/L (ref 95–110)
CO2 SERPL-SCNC: 25 MMOL/L (ref 23–29)
CREAT SERPL-MCNC: 0.9 MG/DL (ref 0.5–1.4)
DIFFERENTIAL METHOD BLD: ABNORMAL
EOSINOPHIL # BLD AUTO: 0.1 K/UL (ref 0–0.5)
EOSINOPHIL NFR BLD: 1.2 % (ref 0–8)
ERYTHROCYTE [DISTWIDTH] IN BLOOD BY AUTOMATED COUNT: 12.5 % (ref 11.5–14.5)
EST. GFR  (NO RACE VARIABLE): >60 ML/MIN/1.73 M^2
GLUCOSE SERPL-MCNC: 100 MG/DL (ref 70–110)
HCT VFR BLD AUTO: 39.9 % (ref 40–54)
HGB BLD-MCNC: 13.4 G/DL (ref 14–18)
IGA SERPL-MCNC: 286 MG/DL (ref 40–350)
IGG SERPL-MCNC: 940 MG/DL (ref 650–1600)
IGM SERPL-MCNC: 69 MG/DL (ref 50–300)
IMM GRANULOCYTES # BLD AUTO: 0.02 K/UL (ref 0–0.04)
IMM GRANULOCYTES NFR BLD AUTO: 0.2 % (ref 0–0.5)
LDH SERPL L TO P-CCNC: 198 U/L (ref 110–260)
LYMPHOCYTES # BLD AUTO: 4.2 K/UL (ref 1–4.8)
LYMPHOCYTES NFR BLD: 41.3 % (ref 18–48)
MCH RBC QN AUTO: 31 PG (ref 27–31)
MCHC RBC AUTO-ENTMCNC: 33.6 G/DL (ref 32–36)
MCV RBC AUTO: 92 FL (ref 82–98)
MONOCYTES # BLD AUTO: 1 K/UL (ref 0.3–1)
MONOCYTES NFR BLD: 9.3 % (ref 4–15)
NEUTROPHILS # BLD AUTO: 4.9 K/UL (ref 1.8–7.7)
NEUTROPHILS NFR BLD: 47.7 % (ref 38–73)
NRBC BLD-RTO: 0 /100 WBC
PLATELET # BLD AUTO: 291 K/UL (ref 150–450)
PMV BLD AUTO: 9.4 FL (ref 9.2–12.9)
POTASSIUM SERPL-SCNC: 4.9 MMOL/L (ref 3.5–5.1)
PROT SERPL-MCNC: 7.2 G/DL (ref 6–8.4)
RBC # BLD AUTO: 4.32 M/UL (ref 4.6–6.2)
SODIUM SERPL-SCNC: 139 MMOL/L (ref 136–145)
WBC # BLD AUTO: 10.18 K/UL (ref 3.9–12.7)

## 2025-03-05 PROCEDURE — 84165 PROTEIN E-PHORESIS SERUM: CPT | Mod: 26,,, | Performed by: PATHOLOGY

## 2025-03-05 PROCEDURE — 88189 FLOWCYTOMETRY/READ 16 & >: CPT | Mod: ,,, | Performed by: PATHOLOGY

## 2025-03-05 PROCEDURE — 3075F SYST BP GE 130 - 139MM HG: CPT | Mod: CPTII,S$GLB,, | Performed by: INTERNAL MEDICINE

## 2025-03-05 PROCEDURE — 3008F BODY MASS INDEX DOCD: CPT | Mod: CPTII,S$GLB,, | Performed by: INTERNAL MEDICINE

## 2025-03-05 PROCEDURE — 86334 IMMUNOFIX E-PHORESIS SERUM: CPT | Performed by: STUDENT IN AN ORGANIZED HEALTH CARE EDUCATION/TRAINING PROGRAM

## 2025-03-05 PROCEDURE — 3044F HG A1C LEVEL LT 7.0%: CPT | Mod: CPTII,S$GLB,, | Performed by: INTERNAL MEDICINE

## 2025-03-05 PROCEDURE — 86334 IMMUNOFIX E-PHORESIS SERUM: CPT | Mod: 26,,, | Performed by: PATHOLOGY

## 2025-03-05 PROCEDURE — G2211 COMPLEX E/M VISIT ADD ON: HCPCS | Mod: S$GLB,,, | Performed by: INTERNAL MEDICINE

## 2025-03-05 PROCEDURE — 88184 FLOWCYTOMETRY/ TC 1 MARKER: CPT | Performed by: PATHOLOGY

## 2025-03-05 PROCEDURE — 83521 IG LIGHT CHAINS FREE EACH: CPT | Mod: 59 | Performed by: STUDENT IN AN ORGANIZED HEALTH CARE EDUCATION/TRAINING PROGRAM

## 2025-03-05 PROCEDURE — 99205 OFFICE O/P NEW HI 60 MIN: CPT | Mod: S$GLB,,, | Performed by: INTERNAL MEDICINE

## 2025-03-05 PROCEDURE — 85025 COMPLETE CBC W/AUTO DIFF WBC: CPT | Performed by: STUDENT IN AN ORGANIZED HEALTH CARE EDUCATION/TRAINING PROGRAM

## 2025-03-05 PROCEDURE — 84165 PROTEIN E-PHORESIS SERUM: CPT | Performed by: STUDENT IN AN ORGANIZED HEALTH CARE EDUCATION/TRAINING PROGRAM

## 2025-03-05 PROCEDURE — 82784 ASSAY IGA/IGD/IGG/IGM EACH: CPT | Mod: 59 | Performed by: STUDENT IN AN ORGANIZED HEALTH CARE EDUCATION/TRAINING PROGRAM

## 2025-03-05 PROCEDURE — 99999 PR PBB SHADOW E&M-EST. PATIENT-LVL III: CPT | Mod: PBBFAC,,, | Performed by: INTERNAL MEDICINE

## 2025-03-05 PROCEDURE — 3078F DIAST BP <80 MM HG: CPT | Mod: CPTII,S$GLB,, | Performed by: INTERNAL MEDICINE

## 2025-03-05 PROCEDURE — 88185 FLOWCYTOMETRY/TC ADD-ON: CPT | Performed by: PATHOLOGY

## 2025-03-05 PROCEDURE — 83615 LACTATE (LD) (LDH) ENZYME: CPT | Performed by: STUDENT IN AN ORGANIZED HEALTH CARE EDUCATION/TRAINING PROGRAM

## 2025-03-05 PROCEDURE — 36415 COLL VENOUS BLD VENIPUNCTURE: CPT | Performed by: STUDENT IN AN ORGANIZED HEALTH CARE EDUCATION/TRAINING PROGRAM

## 2025-03-05 PROCEDURE — 80053 COMPREHEN METABOLIC PANEL: CPT | Performed by: STUDENT IN AN ORGANIZED HEALTH CARE EDUCATION/TRAINING PROGRAM

## 2025-03-05 NOTE — PROGRESS NOTES
"Hematology and Medical Oncology   New Patient Consult     03/05/2025  Referred by:  Dr. Mega Argueta    Primary Oncologic Diagnosis: Lymphoproliferative disorder [D47.9]      History of Present Ilness:   Eve Choi III ([place]) is a pleasant 54 y.o.male with seronegative arthritis, hypothyroidism who presents for evaluation of lymphoproliferative disorder.    Presents with his wife. He was initially seen by Hematology in 2015 by Dr. Almazan for leukocytosis. At that time, it was suspected to be due to inflammatory conditions as he was being worked up for autoimmune disorders after knee surgery. For his seronegative arthritis, he has been on multiple agents including methotrexate, humera and most recently on renvoq. Then in February 2025, he was seen by Dr. Georges for evaluation of leukocytosis after being referred by his Rheumatologist Dr. Calvin Fernandes. Flow cytometry showed 15% light-chain restricted B cells that were negative for CD5 and CD10. He was told that this indicated "preleukemia or pre-lymphoma" and to follow up in a year. He overall feels well - no fevers, night sweats, weight loss, appetite issues, pain.     PAST MEDICAL HISTORY:   Past Medical History:   Diagnosis Date    Arthritis     Hypercholesteremia     Thyroid disease        PAST SURGICAL HISTORY:   Past Surgical History:   Procedure Laterality Date    COLONOSCOPY      COLONOSCOPY N/A 3/25/2021    Procedure: COLONOSCOPY;  Surgeon: Jd Bundy MD;  Location: [address];  Service: Endoscopy;  Laterality: N/A;  covid test 3/22 primary care, instructions emailed-[facility]    HAND SURGERY  1990    right    KNEE CARTILAGE SURGERY  1993    5 procedures starting in 1985       PAST SOCIAL HISTORY:   reports that he has never smoked. He has never used smokeless tobacco. He reports current alcohol use of about 4.2 - 5.8 standard drinks of alcohol per week. He reports that he does not use drugs.    FAMILY HISTORY:  Family History "   Problem Relation Name Age of Onset    Cancer Mother          bladder, ovarian, melanoma, colon    Coronary artery disease Father      Cancer Maternal Grandmother      Coronary artery disease Paternal Grandmother      Coronary artery disease Paternal Grandfather      Cancer Maternal Uncle          colon cancer     Cancer Maternal Aunt          ovarian, ?    Cancer Maternal Uncle          stomache, prostate       CURRENT MEDICATIONS:   Current Outpatient Medications   Medication Sig    celecoxib (CELEBREX) 200 MG capsule Take 200 mg by mouth.    levothyroxine (SYNTHROID) 50 MCG tablet Take 1 tablet (50 mcg total) by mouth once daily.    multivitamin capsule Take 1 capsule by mouth once daily.    propranoloL (INDERAL) 10 MG tablet Take 1 tablet (10 mg total) by mouth daily as needed (speaking anxiety).    rosuvastatin (CRESTOR) 10 MG tablet Take 1 tablet (10 mg total) by mouth every evening.    tirzepatide, weight loss, 10 mg/0.5 mL PnIj Inject 10 mg into the skin every 7 days.    zolpidem (AMBIEN CR) 6.25 MG CR tablet Take 1 tablet (6.25 mg total) by mouth nightly as needed for Insomnia.    RINVOQ 15 mg 24 hr tablet  (Patient not taking: Reported on 3/5/2025)     No current facility-administered medications for this visit.     ALLERGIES:   Review of patient's allergies indicates:  No Known Allergies      Review of Systems:     Review of Systems   Constitutional:  Negative for fatigue, fever and unexpected weight change.   HENT:   Negative for mouth sores and nosebleeds.    Respiratory:  Negative for cough and shortness of breath.    Cardiovascular:  Negative for chest pain and leg swelling.   Gastrointestinal:  Negative for abdominal pain and nausea.   Genitourinary:  Negative for difficulty urinating and dysuria.    Musculoskeletal:  Negative for arthralgias and back pain.   Skin:  Negative for rash and wound.   Neurological:  Negative for headaches, light-headedness and numbness.   Psychiatric/Behavioral:   Negative for confusion. The patient is not nervous/anxious.           Physical Exam:     Vitals:    03/05/25 0951   BP: 135/79   Pulse: 75   Resp: 18   Temp: 98 °F (36.7 °C)       Physical Exam  Vitals reviewed.   Constitutional:       General: He is not in acute distress.  HENT:      Head: Normocephalic and atraumatic.      Nose: Nose normal. No congestion.   Eyes:      General: No scleral icterus.     Extraocular Movements: Extraocular movements intact.   Cardiovascular:      Rate and Rhythm: Normal rate and regular rhythm.   Pulmonary:      Effort: Pulmonary effort is normal. No respiratory distress.   Abdominal:      Palpations: Abdomen is soft.      Tenderness: There is no abdominal tenderness.   Musculoskeletal:         General: No swelling. Normal range of motion.      Cervical back: Normal range of motion and neck supple.   Skin:     General: Skin is warm and dry.   Neurological:      General: No focal deficit present.      Mental Status: He is alert and oriented to person, place, and time.   Psychiatric:         Mood and Affect: Mood normal.         Behavior: Behavior normal.         ECOG Performance Status: (foot note - ECOG PS provided by Eastern Cooperative Oncology Group) 0 - Asymptomatic    Karnofsky Performance Score:  100%- Normal, No Complaints, No Evidence of Disease    Labs:   Reviewed   2/10/2025: Flow cytometry with 15% LIGHT-CHAIN RESTRICTED B-CELLS DETECTED, NEGATIVE FOR CD5 AND CD10 (SEE COMMENT)     Clonal CD20+ B-cells coexpress kappa light chain. They comprise 15% of all analyzed white blood cells and demonstrate no significant coexpression of CD5, CD10 or CD11c. Other lymphocytes are immunophenotypically unremarkable. Granulocytes are immunophenotypically mature.     COMMENT: Diagnostic considerations include involvement by monoclonal B-cell lymphocytosis, and marginal zone lymphomas, lymphoplasmacytic lymphoma or other B-cell lymphoproliferative disorders lacking significant expression  of CD5 and CD10. Correlation with morphology, clinical history and other diagnostic information is necessary for specific diagnosis.     Imaging: reviewed      Assessment and Plan:     Mr. Eve Choi is a pleasant 54 year old man who presents for further evaluation of lymphoproliferative disorder.    Lymphoproliferative disorder  - 2/10/25 flow cytometry showed 15% light-chain restricted B-cells, possibly related to previous use of multiple immunomodulator agents for seronegative arthritis vs low grade lymphoproliferative disorder. Other labs show mild anemia with normal WBC and platelets.   - Will repeat flow cytometry and obtain labs to evaluate for processes such as LPL  - PET scan to evaluate for lymphadenopathy and possible lymphoma   - If above workup is negative, will follow up in 3 months with labs    Seronegative arthritis  - Follows with Rheumatology, Dr. Fernandes. Previously on renvoq, which he is currently not on. Advised to remain off immunomodulator agents unless joint pain worsens.      30 minutes were spent face to face with the patient and his family to discuss the disease, natural history, treatment options and survival statistics. I have provided the patient with an opportunity to ask questions and have all questions answered to his satisfaction.       he will return to clinic in 3 months, but knows to call in the interim if symptoms change or should a problem arise.    Discussed with Dr. Smith.    Key Wiley MD   Hematology and Oncology Fellow, PGY VI       I have seen the patient, reviewed the fellow's assessment and plan.  I have personally interviewed and examined the patient at bedside and agree with the fellow, with the following exceptions: Will complete evaluation with a PET to assess for possible FDG avid adenopathy requiring biopsy. Suspect we can continue to monitor with labs at 2-3 month interval.      Ashely Smith MD  Hematology and Medical Oncology  Bone Marrow  Transplant  [facility]    Route Chart for Scheduling    BMT Chart Routing      Follow up with physician 3 months.   Follow up with MAUDE    Provider visit type Malignant hem   Infusion scheduling note    Injection scheduling note    Labs CBC, CMP, SPEP, free light chains, immunofixation and immunoglobulins   Scheduling:  Preferred lab:  Lab interval:  CBC, CMP, FLC, SPEP, HAZEL, immunoglobulins in 2-3 days prior to visit in 3 months   Imaging PET scan   Please schedule PET scan next available if approved by insurance   Pharmacy appointment    Other referrals

## 2025-03-06 DIAGNOSIS — D47.9 LYMPHOPROLIFERATIVE DISORDER: Primary | ICD-10-CM

## 2025-03-06 LAB
ALBUMIN SERPL ELPH-MCNC: 4.11 G/DL (ref 3.35–5.55)
ALPHA1 GLOB SERPL ELPH-MCNC: 0.23 G/DL (ref 0.17–0.41)
ALPHA2 GLOB SERPL ELPH-MCNC: 0.56 G/DL (ref 0.43–0.99)
B-GLOBULIN SERPL ELPH-MCNC: 0.86 G/DL (ref 0.5–1.1)
FLOW CYTOMETRY ANTIBODIES ANALYZED - BLOOD: NORMAL
FLOW CYTOMETRY COMMENT - BLOOD: NORMAL
FLOW CYTOMETRY INTERPRETATION - BLOOD: NORMAL
GAMMA GLOB SERPL ELPH-MCNC: 0.94 G/DL (ref 0.67–1.58)
INTERPRETATION SERPL IFE-IMP: NORMAL
KAPPA LC SER QL IA: 1.67 MG/DL (ref 0.33–1.94)
KAPPA LC/LAMBDA SER IA: 0.88 (ref 0.26–1.65)
LAMBDA LC SER QL IA: 1.9 MG/DL (ref 0.57–2.63)
PROT SERPL-MCNC: 6.7 G/DL (ref 6–8.4)

## 2025-03-08 LAB
PATHOLOGIST INTERPRETATION IFE: NORMAL
PATHOLOGIST INTERPRETATION SPE: NORMAL

## 2025-03-10 ENCOUNTER — RESULTS FOLLOW-UP (OUTPATIENT)
Dept: HEMATOLOGY/ONCOLOGY | Facility: CLINIC | Age: 55
End: 2025-03-10

## 2025-03-10 ENCOUNTER — PATIENT MESSAGE (OUTPATIENT)
Dept: HEMATOLOGY/ONCOLOGY | Facility: CLINIC | Age: 55
End: 2025-03-10
Payer: COMMERCIAL

## 2025-03-10 NOTE — TELEPHONE ENCOUNTER
Called pt in regards to recent lab work on 3/5. Informed pt that Dr. Smith had advised that all labs were stable and within baseline and presented no concern at this time. Pt verbalized understanding and had no other questions.

## 2025-03-10 NOTE — PROGRESS NOTES
Agree with Dr. Wiley. Labs look very good. Flow cytometry shows some abnormal B cells [which we already knew about] and will do PET to complete staging.

## 2025-03-12 ENCOUNTER — PATIENT MESSAGE (OUTPATIENT)
Dept: SLEEP MEDICINE | Facility: CLINIC | Age: 55
End: 2025-03-12
Payer: COMMERCIAL

## 2025-03-15 ENCOUNTER — PATIENT MESSAGE (OUTPATIENT)
Dept: HEMATOLOGY/ONCOLOGY | Facility: CLINIC | Age: 55
End: 2025-03-15
Payer: COMMERCIAL

## 2025-03-27 ENCOUNTER — HOSPITAL ENCOUNTER (OUTPATIENT)
Dept: RADIOLOGY | Facility: HOSPITAL | Age: 55
Discharge: HOME | End: 2025-03-27
Attending: STUDENT IN AN ORGANIZED HEALTH CARE EDUCATION/TRAINING PROGRAM
Payer: COMMERCIAL

## 2025-03-27 DIAGNOSIS — D47.9 LYMPHOPROLIFERATIVE DISORDER: ICD-10-CM

## 2025-03-27 LAB — POCT GLUCOSE: 83 MG/DL (ref 70–110)

## 2025-03-27 PROCEDURE — 78816 PET IMAGE W/CT FULL BODY: CPT | Mod: TC

## 2025-03-27 PROCEDURE — 78816 PET IMAGE W/CT FULL BODY: CPT | Mod: 26,PI,, | Performed by: NUCLEAR MEDICINE

## 2025-03-27 PROCEDURE — A9552 F18 FDG: HCPCS | Performed by: STUDENT IN AN ORGANIZED HEALTH CARE EDUCATION/TRAINING PROGRAM

## 2025-03-27 RX ORDER — FLUDEOXYGLUCOSE F18 500 MCI/ML
8.1 INJECTION INTRAVENOUS
Status: COMPLETED | OUTPATIENT
Start: 2025-03-27 | End: 2025-03-27

## 2025-03-27 RX ADMIN — FLUDEOXYGLUCOSE F-18 8.1 MILLICURIE: 500 INJECTION INTRAVENOUS at 08:03

## 2025-04-10 NOTE — PROGRESS NOTES
ENDOCRINOLOGY VISIT  04/14/2025    Reason for Visit:  referred by Mega Argueta MD for evaluation of weight gain    HPI:  Eve Choi III is a 54 y.o. male with hx of RA (periodically treated with short courses of prednisone), hyperlipidemia, hypothyroidism, GERD who presents for evaluation of weight gain.    Previous patient of Dr Soto and myself. Last visit in Jan 2024    The patient location is: in [place]  The chief complaint leading to consultation is: thyroid disease     Visit type: audiovisual    Face to Face time with patient: 11  35 minutes of total time spent on the encounter, which includes face to face time and non-face to face time preparing to see the patient (eg, review of tests), Obtaining and/or reviewing separately obtained history, Documenting clinical information in the electronic or other health record, Independently interpreting results (not separately reported) and communicating results to the patient/family/caregiver, or Care coordination (not separately reported).    Each patient to whom he or she provides medical services by telemedicine is:  (1) informed of the relationship between the physician and patient and the respective role of any other health care provider with respect to management of the patient; and (2) notified that he or she may decline to receive medical services by telemedicine and may withdraw from such care at any time.    Since his last visit he was dx with B-cell lymphoma     Since his last visit we started LT4 50 mcg daily.   He has switched Ozempic to Mounjaro 10 mg weekly.   Denies much weight loss since switching to Mounjaro but plans on increasing. Feels okay on dosage.     Wt Readings from Last 3 Encounters:   04/07/25 1441 115.9 kg (255 lb 9.6 oz)   03/05/25 0951 114.3 kg (251 lb 14 oz)   01/14/25 0935 115.2 kg (253 lb 13.8 oz)     Regarding weight gain:    Patient reports that weight has increased gradually over time.   Baseline wt is about 210, goal is  "230, now at 250    Previous wt management strategies:  Tried ideal protein, intermittent fasting, atkins, exercise    Current diet:  none    Physical Activity:  2 days/wk circuit training for 50 min     Denies any changes to medications recently (only occasionally getting short courses of prednisone for arthritis)  Not on any meds that cause wt gain.    Wt Readings from Last 3 Encounters:   04/07/25 115.9 kg (255 lb 9.6 oz)   03/05/25 114.3 kg (251 lb 14 oz)   01/14/25 115.2 kg (253 lb 13.8 oz)      Symptoms of hypercortisolemia:     Yes  No   Easy bruising:  []   [x]    Fragility fracture: []   [x]  Only hx of fractures when younger from trauma  Violaceous Striae: []   [x]       Had left knee replacement in March 2024    Random cortisol  No results found for: "CORTISOL", "LABCORT"  Lab Results   Component Value Date    HGBA1C 5.1 01/07/2025     Denies hx of pancreatitis or MTC    With regards to hypothyroidism,     Dx with hypothyroidism >20 years ago  Does not remember symptoms on Dx but had blood work and was put on medication  + Tg Ab  - TPO    Was on LT4 100 mcg daily but ran out >1 month ago and had labs this week that were normal    He is exercising circuit training 2 days weekly and cardio  He is currently on Levothyroxine 50 mcg daily  Denies missing doses  He is taking correctly.     current symptoms:     [x] weight gain and difficulty losing weight  [x] Fatigue  [] Constipation         [] Hair loss  [] Brittle nails [] Mental fog [] Chest pain, palpations, or sob   []  cold intolerance  [] Denies complaints    Has seen sleep medicine this year and no sleep apnea     Denies selenium   + Biotin usage (periodically taking)    Lab Results   Component Value Date    TSH 2.262 01/07/2025    FREET4 0.94 05/25/2011      Latest Reference Range & Units 01/23/23 10:24   Thyroglobulin Ab Screen 0.0 - 3.9 IU/mL 52.5 (H)   Thyroperoxidase Antibodies <6.0 IU/mL <6.0   (H): Data is abnormally high    Lab Results "   Component Value Date    TSH 2.262 01/07/2025    FREET4 0.94 05/25/2011      Latest Reference Range & Units 01/27/22 08:55 01/23/23 08:20 01/23/23 10:24 01/23/24 08:11   TSH 0.400 - 4.000 uIU/mL 3.300 2.634  3.997   Thyroglobulin Ab Screen 0.0 - 3.9 IU/mL   52.5 (H)    Thyroperoxidase Antibodies <6.0 IU/mL   <6.0    (H): Data is abnormally high    FIB-4 Calculation: 1.45 at 3/5/2025 10:53 AM  Calculated from:  SGOT/AST: 37 U/L at 3/5/2025 10:53 AM  SGPT/ALT: 24 U/L at 3/5/2025 10:53 AM  Platelets: 291 K/uL at 3/5/2025 10:53 AM  Age: 54 years     FIB-4 below 1.30 is considered as low-risk for advanced fibrosis  FIB-4 over 2.67 is considered as high-risk for advanced fibrosis  FIB-4 values between 1.30 and 2.67 are considered as intermediate-risk of advanced fibrosis for ages 36-64.     For ages > 64 the cut-off for low-risk goes to < 2.  This is a screening tool and clinical judgement should be used in the interpretation of these results.    Review of patient's allergies indicates:  No Known Allergies      Current Outpatient Medications:     celecoxib (CELEBREX) 200 MG capsule, Take 200 mg by mouth., Disp: , Rfl:     multivitamin capsule, Take 1 capsule by mouth once daily., Disp: , Rfl:     propranoloL (INDERAL) 10 MG tablet, Take 1 tablet (10 mg total) by mouth daily as needed (speaking anxiety)., Disp: 30 tablet, Rfl: 0    RINVOQ 15 mg 24 hr tablet, , Disp: , Rfl:     rosuvastatin (CRESTOR) 10 MG tablet, Take 1 tablet (10 mg total) by mouth every evening., Disp: 90 tablet, Rfl: 3    tirzepatide, weight loss, 10 mg/0.5 mL PnIj, Inject 10 mg into the skin every 7 days., Disp: , Rfl:     zolpidem (AMBIEN CR) 6.25 MG CR tablet, Take 1 tablet (6.25 mg total) by mouth nightly as needed for Insomnia., Disp: 30 tablet, Rfl: 1    levothyroxine (SYNTHROID) 50 MCG tablet, Take 1 tablet (50 mcg total) by mouth once daily., Disp: 90 tablet, Rfl: 3      ROS: see HPI     PHYSICAL EXAM  There were no vitals taken for this  visit.  Wt Readings from Last 3 Encounters:   04/07/25 115.9 kg (255 lb 9.6 oz)   03/05/25 114.3 kg (251 lb 14 oz)   01/14/25 115.2 kg (253 lb 13.8 oz)   ]    Constitutional:  Pleasant,  in no acute distress.   HENT:   Eyes:     No scleral icterus.   Respiratory:   Effort normal   Neurological:  normal speech  Psych:  Normal mood and affect.    No bruising on extremities      IMAGING STUDIES    ASSESSMENT/PLAN  1. Hypothyroidism (acquired)  Ambulatory referral/consult to Endocrinology    levothyroxine (SYNTHROID) 50 MCG tablet      2. At risk for impaired function of liver  FibroScan Daisy (Vibration Controlled Transient Elastography)        Hypothyroidism (acquired)  -antithyroglobulin AB pos in Jan 2023  Now on LT4 50 mcg daily   TSH at goal in Jan 2025  Check TSH annually     At risk for impaired function of liver  Check fibroscan considering elevated fib score    Visit today included increased complexity associated with the care of episodic problems hypothyroidism and at risk for liver disease addressed and managing longitudinal care of the patient due to serious managed problems hypothyroidism and at risk for liver disease     Follow up in about 1 year (around 4/14/2026).    Berta Shah, ARY

## 2025-04-14 ENCOUNTER — OFFICE VISIT (OUTPATIENT)
Dept: ENDOCRINOLOGY | Facility: CLINIC | Age: 55
End: 2025-04-14
Payer: COMMERCIAL

## 2025-04-14 DIAGNOSIS — Z91.89 AT RISK FOR IMPAIRED FUNCTION OF LIVER: ICD-10-CM

## 2025-04-14 DIAGNOSIS — E03.9 HYPOTHYROIDISM (ACQUIRED): Primary | ICD-10-CM

## 2025-04-14 RX ORDER — LEVOTHYROXINE SODIUM 50 UG/1
50 TABLET ORAL DAILY
Qty: 90 TABLET | Refills: 3 | Status: SHIPPED | OUTPATIENT
Start: 2025-04-14 | End: 2026-04-14

## 2025-04-14 NOTE — ASSESSMENT & PLAN NOTE
-antithyroglobulin AB pos in Jan 2023  Now on LT4 50 mcg daily   TSH at goal in Jan 2025  Check TSH annually

## 2025-05-14 ENCOUNTER — OFFICE VISIT (OUTPATIENT)
Dept: RHEUMATOLOGY | Facility: CLINIC | Age: 55
End: 2025-05-14
Attending: FAMILY MEDICINE
Payer: COMMERCIAL

## 2025-05-14 ENCOUNTER — HOSPITAL ENCOUNTER (OUTPATIENT)
Dept: RADIOLOGY | Facility: HOSPITAL | Age: 55
Discharge: HOME | End: 2025-05-14
Attending: STUDENT IN AN ORGANIZED HEALTH CARE EDUCATION/TRAINING PROGRAM
Payer: COMMERCIAL

## 2025-05-14 ENCOUNTER — LAB VISIT (OUTPATIENT)
Dept: LAB | Facility: HOSPITAL | Age: 55
End: 2025-05-14
Payer: COMMERCIAL

## 2025-05-14 VITALS
HEIGHT: 73 IN | HEART RATE: 98 BPM | BODY MASS INDEX: 33.04 KG/M2 | SYSTOLIC BLOOD PRESSURE: 94 MMHG | WEIGHT: 249.31 LBS | DIASTOLIC BLOOD PRESSURE: 67 MMHG

## 2025-05-14 DIAGNOSIS — M13.80 SERONEGATIVE ARTHRITIS: ICD-10-CM

## 2025-05-14 DIAGNOSIS — D46.9: ICD-10-CM

## 2025-05-14 LAB
CRP SERPL-MCNC: 1.4 MG/L
ERYTHROCYTE [SEDIMENTATION RATE] IN BLOOD BY PHOTOMETRIC METHOD: 11 MM/HR
HAV AB SER QL IA: REACTIVE
HBV CORE AB SERPL QL IA: NORMAL
HBV SURFACE AB SER-ACNC: 96.37 MIU/ML
HBV SURFACE AB SERPL IA-ACNC: REACTIVE M[IU]/ML
HBV SURFACE AG SERPL QL IA: NORMAL
HCV AB SERPL QL IA: NORMAL
HIV 1+2 AB+HIV1 P24 AG SERPL QL IA: NORMAL
RHEUMATOID FACT SERPL-ACNC: <13 IU/ML
T PALLIDUM IGG+IGM SER QL: NORMAL
V.ZOSTER IGG INTERP (OHS): POSITIVE
VARICELLA ZOSTER IGG (OHS): 24.2 S/CO

## 2025-05-14 PROCEDURE — 86682 HELMINTH ANTIBODY: CPT

## 2025-05-14 PROCEDURE — 86790 VIRUS ANTIBODY NOS: CPT

## 2025-05-14 PROCEDURE — 86225 DNA ANTIBODY NATIVE: CPT

## 2025-05-14 PROCEDURE — 3044F HG A1C LEVEL LT 7.0%: CPT | Mod: CPTII,S$GLB,, | Performed by: STUDENT IN AN ORGANIZED HEALTH CARE EDUCATION/TRAINING PROGRAM

## 2025-05-14 PROCEDURE — 86480 TB TEST CELL IMMUN MEASURE: CPT

## 2025-05-14 PROCEDURE — 86704 HEP B CORE ANTIBODY TOTAL: CPT

## 2025-05-14 PROCEDURE — 3008F BODY MASS INDEX DOCD: CPT | Mod: CPTII,S$GLB,, | Performed by: STUDENT IN AN ORGANIZED HEALTH CARE EDUCATION/TRAINING PROGRAM

## 2025-05-14 PROCEDURE — 86431 RHEUMATOID FACTOR QUANT: CPT

## 2025-05-14 PROCEDURE — 86235 NUCLEAR ANTIGEN ANTIBODY: CPT | Mod: 59

## 2025-05-14 PROCEDURE — 85652 RBC SED RATE AUTOMATED: CPT

## 2025-05-14 PROCEDURE — 73130 X-RAY EXAM OF HAND: CPT | Mod: TC,50

## 2025-05-14 PROCEDURE — 1159F MED LIST DOCD IN RCRD: CPT | Mod: CPTII,S$GLB,, | Performed by: STUDENT IN AN ORGANIZED HEALTH CARE EDUCATION/TRAINING PROGRAM

## 2025-05-14 PROCEDURE — 99204 OFFICE O/P NEW MOD 45 MIN: CPT | Mod: S$GLB,,, | Performed by: STUDENT IN AN ORGANIZED HEALTH CARE EDUCATION/TRAINING PROGRAM

## 2025-05-14 PROCEDURE — 81374 HLA I TYPING 1 ANTIGEN LR: CPT | Performed by: STUDENT IN AN ORGANIZED HEALTH CARE EDUCATION/TRAINING PROGRAM

## 2025-05-14 PROCEDURE — 87340 HEPATITIS B SURFACE AG IA: CPT

## 2025-05-14 PROCEDURE — 86706 HEP B SURFACE ANTIBODY: CPT | Mod: 59

## 2025-05-14 PROCEDURE — 86039 ANTINUCLEAR ANTIBODIES (ANA): CPT

## 2025-05-14 PROCEDURE — 86803 HEPATITIS C AB TEST: CPT

## 2025-05-14 PROCEDURE — 99999 PR PBB SHADOW E&M-EST. PATIENT-LVL IV: CPT | Mod: PBBFAC,,, | Performed by: STUDENT IN AN ORGANIZED HEALTH CARE EDUCATION/TRAINING PROGRAM

## 2025-05-14 PROCEDURE — 3074F SYST BP LT 130 MM HG: CPT | Mod: CPTII,S$GLB,, | Performed by: STUDENT IN AN ORGANIZED HEALTH CARE EDUCATION/TRAINING PROGRAM

## 2025-05-14 PROCEDURE — 87389 HIV-1 AG W/HIV-1&-2 AB AG IA: CPT

## 2025-05-14 PROCEDURE — 86140 C-REACTIVE PROTEIN: CPT

## 2025-05-14 PROCEDURE — 86787 VARICELLA-ZOSTER ANTIBODY: CPT

## 2025-05-14 PROCEDURE — 72202 X-RAY EXAM SI JOINTS 3/> VWS: CPT | Mod: TC

## 2025-05-14 PROCEDURE — 86593 SYPHILIS TEST NON-TREP QUANT: CPT

## 2025-05-14 PROCEDURE — 86235 NUCLEAR ANTIGEN ANTIBODY: CPT

## 2025-05-14 PROCEDURE — 36415 COLL VENOUS BLD VENIPUNCTURE: CPT

## 2025-05-14 PROCEDURE — 3078F DIAST BP <80 MM HG: CPT | Mod: CPTII,S$GLB,, | Performed by: STUDENT IN AN ORGANIZED HEALTH CARE EDUCATION/TRAINING PROGRAM

## 2025-05-14 PROCEDURE — 73070 X-RAY EXAM OF ELBOW: CPT | Mod: TC,50

## 2025-05-14 ASSESSMENT — ROUTINE ASSESSMENT OF PATIENT INDEX DATA (RAPID3)
PAIN SCORE: 7
PSYCHOLOGICAL DISTRESS SCORE: 1.1
AM STIFFNESS SCORE: 0, NO
PATIENT GLOBAL ASSESSMENT SCORE: 3
TOTAL RAPID3 SCORE: 3.33
MDHAQ FUNCTION SCORE: 0
FATIGUE SCORE: 7.5

## 2025-05-14 NOTE — PROGRESS NOTES
"Subjective:      Patient ID: Eve Choi III is a 54 y.o. male.    Chief Complaint: Disease Management            Pertinent negatives include no fever, trouble swallowing or headaches.         Symptoms started about 15 years ago - had high inflammatory markers and had full body synovitis after a knee replacement.     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 "Roams" - would be in a toe then a finger etc. Feels much better with exercise and movement.    Previously MTX, humira, cosentyx, infusions, enbrel ultimately ended up on rinvoq. Has been on rivoq for at least one year. Does work very well.     Had a rheumatologist Dr. Fernandes - original rheumatologist passed away. Him and his oncologist had communicated and led to them deciding to continue    Pre-leukemia diagnsoed recently with his high WBC. Wanted to see another rheumatologist - third opinion. Found this out in January.     Denies dactylitis. Hips and low back are involved. Did have an MRI that showed degenerative disease.    No night sweats, weight loss, fevers.     No uveitis hx, has some dry eye. Does have a hx of tennis/golfer elbow. Does feel that his left knee tendon is bothered.     Replacement on the left knee a year ago. Other surgeries started ~15 years ago.    No rashes or psoriasis.     No chronic diarrhea or blood in the stool.     Gets brief stiffness in the mornings. Doesn't find it's gotten worse.    Last time he was on prednisone was about 6 months ago. Takes for flares.    Was getting a hearing loss workup with MRI.    No family autoimmune hx. Did have colitis in college.     Takes crestor for high cholesterol. Takes levothyroxine.    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 Lives here in [place] with family. Gets exercise - circuit training and weight lifting. No specific kind of diet.    Alcohol - drinks on weekends. Never smoker. Very occasional cigarette use.     Review of Systems   Constitutional:  Negative for fever and " "unexpected weight change.   HENT:  Negative for mouth sores and trouble swallowing.    Eyes:  Negative for redness.   Respiratory:  Negative for cough and shortness of breath.    Cardiovascular:  Negative for chest pain.   Gastrointestinal:  Negative for constipation and diarrhea.   Genitourinary:  Negative for genital sores.   Skin:  Negative for rash.   Neurological:  Negative for headaches.   Hematological:  Does not bruise/bleed easily.        Objective:   BP 94/67   Pulse 98   Ht 6' 1" (1.854 m)   Wt 113.1 kg (249 lb 5.4 oz)   BMI 32.90 kg/m²   Physical Exam   Constitutional: He is oriented to person, place, and time. No distress.   HENT:   Head: Normocephalic and atraumatic.   Cardiovascular: Normal rate, regular rhythm and normal heart sounds.   Pulmonary/Chest: Effort normal and breath sounds normal. No respiratory distress.   Abdominal: Soft.   Musculoskeletal:         General: Swelling and tenderness present. Normal range of motion.      Comments: Tender and swollen left knee   Neurological: He is alert and oriented to person, place, and time.   Skin: Skin is warm and dry. No rash noted.   Psychiatric: His behavior is normal. Judgment and thought content normal.       No data to display     Assessment:     1. Seronegative arthritis    2. Preleukemia      Peripheral Spondyloarthritis  Diagnosis/Important Hx: Clinical diagnosis, primarily tendon involvement, has had synovitis in the past  Relevant serologies: Negative HLA-B27, negative other markers, awaiting repeat workup  Previous Therapy: MTX, chronic low dose steroids, humira, enbrel, cosentyx, ?infusions  Current Therapy: Rinvoq 15mg daily  Imaging/Procedures:    Has had numerous previous MRIs that showed DDD of LB  Other notes: Possible IBD hx from 20s  PLAN  - Can continue Rinvoq (okay with oncology)  - CBC, CMP, ESR, CRP, DARLIN, RF, CCP, HLA B27  - Pre-DMARD labs  - XR hands, elbows, SI joint  - Will get records from Dr. Fernandes  - F/u in 3 " months    Rheumatology Health Maintenance  Vaccinations: Due for COVID and pneumonia  Medication monitoring:    Rinvoq - has pre-cancer, proceeding cautiously  Osteoporosis:    DEXA - N/A   Treatment -   Cardiovascular risk:    BP - appropriate   Lipids - LDL 50 1/2025, on low dose crestor   Glucose - A1c 5.1 1/2025      Problem List Items Addressed This Visit          Orthopedic    Seronegative arthritis     Other Visit Diagnoses         Preleukemia              This patient encounter was staffed and the plan was formulated with rheumatology attending Dr. Guzmán.    Amanda Eng MD  Rheumatology Fellow, PGY-4

## 2025-05-14 NOTE — PROGRESS NOTES
5/7/2025    10:43 AM   Rapid3 Question Responses and Scores   MDHAQ Score 0   Psychologic Score 1.1   Pain Score 7   When you awakened in the morning OVER THE LAST WEEK, did you feel stiff? No   Fatigue Score 7.5   Global Health Score 3   RAPID3 Score 3.33    Answers submitted by the patient for this visit:  Rheumatology Questionnaire (Submitted on 5/7/2025)  fever: No  eye redness: No  mouth sores: No  headaches: No  shortness of breath: No  chest pain: No  trouble swallowing: No  diarrhea: No  constipation: No  unexpected weight change: No  genital sore: No  During the last 3 days, have you had a skin rash?: No  Bruises or bleeds easily: No  cough: No

## 2025-05-14 NOTE — PROGRESS NOTES
I have personally reviewed the history, confirmed exam findings, and discussed assessment and plan with fellow.    Answers submitted by the patient for this visit:  Rheumatology Questionnaire (Submitted on 5/7/2025)  fever: No  eye redness: No  mouth sores: No  headaches: No  shortness of breath: No  chest pain: No  trouble swallowing: No  diarrhea: No  constipation: No  unexpected weight change: No  genital sore: No  During the last 3 days, have you had a skin rash?: No  Bruises or bleeds easily: No  cough: No      Saw Dr. Smith in Hematology-Oncology 3/5/25:  Lymphoproliferative disorder  - 2/10/25 flow cytometry showed 15% light-chain restricted B-cells, possibly related to previous use of multiple immunomodulator agents for seronegative arthritis vs low grade lymphoproliferative disorder. Other labs show mild anemia with normal WBC and platelets.   - Will repeat flow cytometry and obtain labs to evaluate for processes such as LPL  - PET scan to evaluate for lymphadenopathy and possible lymphoma   - If above workup is negative, will follow up in 3 months with labs     Seronegative arthritis  - Follows with Rheumatology, Dr. Fernandes. Previously on renvoq, which he is currently not on. Advised to remain off immunomodulator agents unless joint pain worsens.    EXAMINATION:  NM PET CT FDG WHOLE BODY     CLINICAL HISTORY:  Hematologic malignancy, staging; Neoplasm of uncertain behavior of lymphoid, hematopoietic and related tissue, unspecified 54-year-old male being evaluated for lymphoproliferative disorder. 02/25     TECHNIQUE:  8.1 mCi of F18-FDG was administered intravenously in the left antecubital fossa.  After an approximately 60 min distribution time, PET/CT images were acquired from the skull base to mid thigh.  Transmission images were acquired to correct for attenuation using a whole body low-dose CT scan without IV contrast with the arms positioned along the side of the torso. Glycemia at the time of  injection was 83 mg/dL.     COMPARISON:  MRI temporal bone 12/26/2024, CT renal stone 12/09/2024     FINDINGS:  Quality of the study: Adequate.     In the head and neck, there are no hypermetabolic lesions worrisome for malignancy. There are no hypermetabolic mucosal lesions, and there are no pathologically enlarged or hypermetabolic lymph nodes.     In the chest, there are no hypermetabolic lesions worrisome for malignancy.  There are no concerning pulmonary nodules or masses, and there are no pathologically enlarged or hypermetabolic lymph nodes.  There is diffuse tracer uptake in the muscles.     In the abdomen and pelvis, there is physiologic tracer distribution within the abdominal organs and excretion into the genitourinary system.     In the bones, there are no hypermetabolic lesions worrisome for malignancy.     In the extremities, there are no hypermetabolic lesions worrisome for malignancy.     Additional CT findings: Bilateral nonobstructing renal stones measuring up to 0.4 cm.  Few scattered colonic diverticula.  Degenerative changes of the spine.     Impression:     No focal hypermetabolic lymphadenopathy or bone marrow uptake related to suspected lymphoproliferative disorder     Electronically signed by resident: Yvonne Khalil  Date:                                            03/27/2025  Time:                                           10:37     Electronically signed by:Lynn Carranza  Date:                                            03/27/2025  Time:                                           11:35    EXAMINATION:  MRI IAC/TEMPORAL BONES W W/O CONTRAST     CLINICAL HISTORY:  asymmetric hearing; Other specified hearing loss, bilateral     TECHNIQUE:  Multiplanar multisequence MR imaging of the brain and IAC's was performed before and after the administration of 10 mL Gadavist intravenous contrast.     COMPARISON:  None.     FINDINGS:  Ventricles normal in size for age.  No hydrocephalus.     The brain  appears within normal limits.  No parenchymal mass, hemorrhage, edema or recent or remote major vascular distribution infarct.  No abnormal postcontrast parenchymal or leptomeningeal enhancement.     No extra-axial blood or fluid collections.     The inner ear structures maintain normal morphology and signal without abnormal enhancement.  Cranial nerve 7-8 complexes appear within normal limits.  No intra canalicular or CP angle cistern mass.     The T2 skull base flow voids are preserved.  Bone marrow signal intensity unremarkable.     Paranasal sinuses and mastoid air cells are essentially clear.     Impression:     No evidence of acute intracranial pathology.     Unremarkable MR appearance of the inner ear structures.        Electronically signed by:John Jones  Date:                                            12/26/2024  Time:                                           16:09    XR SACROILIAC JOINT 3 OR MORE VIEWS  Order: [id number]  Impression      1.   No acute abnormality of the sacroiliac joints.    Electronically Signed By: Tim Meade MD 4/10/2019 2:39 PM CDT  Narrative      INDICATION: SPONDYLOARTHROPATHY    COMPARISON: None    FINDINGS: 3 views of the bilateral sacroiliac joints demonstrates normal alignment. There is no fracture. There is no joint space narrowing. There is no periarticular erosion. There is no subchondral sclerosis.  Exam End: 04/10/19 13:59    Specimen Collected: 04/10/19 14:39 Las     Saw Dr Fernandes his rheumatologist 3/13/25 note unavailable      Seronegative polyarthritis, peripheral spondylarthritis apparently RF negative, B27 negative  H/o Rx with methotrexate, adalimumab, etanercept, secukinumab(no GI issues) then upadacitinib x 1 year he briefly stopped but resumed after PET-CT per Dr. Smith's approval   TJ 1 SJ 1 pt global 30   DAS28  [id number] CDAI  DAPSA  TJ 0  SJ 0  Pt global 3   Pt pain 7      Inflammatory back pain daily am stiffness lasting only minutes however, Improves with  activity   +Fabere left   H/o UC while in college without recurrence last colonoscopy 2021  Denies personal or family history of psoriasis  S/p left TKA 1 year ago Dr. Berman  Hearing loss  ? Cogan's  ? AAV  Low grade B cell lymphoproliferative disorder  Lumbar spondylosis follows with Dr. Bauman  in Pain Management  Hypothyroidism  on levothyroxine 50mg mcg daily   ASCVD on rosuvastatin 10mg daily   H/o kidney stones  + family h/o colon cancer  Class 1 obesity Body mass index is 32.9 kg/m².on tirzepatidie 10mg sc weekly   hyperlipidemia    Obtain records from Dr. Fernandes   CBC, CMP, ESR CRP  RF  ACPA DARLIN B27 pre-DMARD labs lipid panel  X-ray hands  X-ray pelvis with Vásquez view  If x-ray pelvis negative, MRI  SIJ  Cont upadacitinib 15mg daily for now. OK with Dr. Smith in Hematology-Oncology   Cont f/u Dr. Smith in Hematology-Oncology q 3 months  RTC  6-8 wks

## 2025-05-15 ENCOUNTER — RESULTS FOLLOW-UP (OUTPATIENT)
Dept: RHEUMATOLOGY | Facility: CLINIC | Age: 55
End: 2025-05-15

## 2025-05-15 LAB
ANA (OHS): POSITIVE
ANA PATTERN 1 (OHS): ABNORMAL
ANA TITER 1 (OHS): ABNORMAL
MITOGEN MINUS NIL (OHS): 9.97
NIL TB SYNCED (OHS): 0.03
QUANTIFERON GOLD INTERP (OHS): NEGATIVE
TB1 AG MINUS NIL (OHS): <0
TB2 AG MINUS NIL (OHS): <0

## 2025-05-16 LAB
DSDNA ANTIBODY (OHS): NORMAL
DSDNA ANTIBODY TITER (OHS): NORMAL
SM  ANTIBODY (OHS): 0.1 RATIO
SM INTERPRETATION (OHS): NEGATIVE
SM/RNP ANTIBODY (OHS): 0.07 RATIO
SM/RNP INTERPRETATION (OHS): NEGATIVE
SSA  ANTIBODY (OHS): 0.04 RATIO (ref 0–0.99)
SSA INTERPRETATION (OHS): NEGATIVE
SSB  ANTIBODY (OHS): 0.05 RATIO
SSB INTERPRETATION (OHS): NEGATIVE
STRONGYLOIDES IGG SER QL IA: NEGATIVE

## 2025-05-28 ENCOUNTER — LAB VISIT (OUTPATIENT)
Dept: LAB | Facility: HOSPITAL | Age: 55
End: 2025-05-28
Attending: STUDENT IN AN ORGANIZED HEALTH CARE EDUCATION/TRAINING PROGRAM
Payer: COMMERCIAL

## 2025-05-28 DIAGNOSIS — D47.9 LYMPHOPROLIFERATIVE DISORDER: ICD-10-CM

## 2025-05-28 LAB
ABSOLUTE EOSINOPHIL (OHS): 0.05 K/UL
ABSOLUTE MONOCYTE (OHS): 0.72 K/UL (ref 0.3–1)
ABSOLUTE NEUTROPHIL COUNT (OHS): 4.39 K/UL (ref 1.8–7.7)
ALBUMIN SERPL BCP-MCNC: 3.9 G/DL (ref 3.5–5.2)
ALP SERPL-CCNC: 49 UNIT/L (ref 40–150)
ALT SERPL W/O P-5'-P-CCNC: 32 UNIT/L (ref 10–44)
ANION GAP (OHS): 7 MMOL/L (ref 8–16)
AST SERPL-CCNC: 32 UNIT/L (ref 11–45)
BASOPHILS # BLD AUTO: 0.03 K/UL
BASOPHILS NFR BLD AUTO: 0.3 %
BILIRUB SERPL-MCNC: 0.6 MG/DL (ref 0.1–1)
BUN SERPL-MCNC: 17 MG/DL (ref 6–20)
CALCIUM SERPL-MCNC: 9 MG/DL (ref 8.7–10.5)
CHLORIDE SERPL-SCNC: 106 MMOL/L (ref 95–110)
CO2 SERPL-SCNC: 27 MMOL/L (ref 23–29)
CREAT SERPL-MCNC: 1.1 MG/DL (ref 0.5–1.4)
ERYTHROCYTE [DISTWIDTH] IN BLOOD BY AUTOMATED COUNT: 13.2 % (ref 11.5–14.5)
GFR SERPLBLD CREATININE-BSD FMLA CKD-EPI: >60 ML/MIN/1.73/M2
GLUCOSE SERPL-MCNC: 94 MG/DL (ref 70–110)
HCT VFR BLD AUTO: 43.6 % (ref 40–54)
HGB BLD-MCNC: 14.2 GM/DL (ref 14–18)
IGA SERPL-MCNC: 293 MG/DL (ref 40–350)
IGG SERPL-MCNC: 1008 MG/DL (ref 650–1600)
IGM SERPL-MCNC: 77 MG/DL (ref 50–300)
IMM GRANULOCYTES # BLD AUTO: 0.04 K/UL (ref 0–0.04)
IMM GRANULOCYTES NFR BLD AUTO: 0.4 % (ref 0–0.5)
LYMPHOCYTES # BLD AUTO: 4.47 K/UL (ref 1–4.8)
MCH RBC QN AUTO: 29.3 PG (ref 27–31)
MCHC RBC AUTO-ENTMCNC: 32.6 G/DL (ref 32–36)
MCV RBC AUTO: 90 FL (ref 82–98)
NUCLEATED RBC (/100WBC) (OHS): 0 /100 WBC
PLATELET # BLD AUTO: 356 K/UL (ref 150–450)
PMV BLD AUTO: 9.7 FL (ref 9.2–12.9)
POTASSIUM SERPL-SCNC: 4.7 MMOL/L (ref 3.5–5.1)
PROT SERPL-MCNC: 7.2 GM/DL (ref 6–8.4)
RBC # BLD AUTO: 4.84 M/UL (ref 4.6–6.2)
RELATIVE EOSINOPHIL (OHS): 0.5 %
RELATIVE LYMPHOCYTE (OHS): 46.1 % (ref 18–48)
RELATIVE MONOCYTE (OHS): 7.4 % (ref 4–15)
RELATIVE NEUTROPHIL (OHS): 45.3 % (ref 38–73)
SODIUM SERPL-SCNC: 140 MMOL/L (ref 136–145)
WBC # BLD AUTO: 9.7 K/UL (ref 3.9–12.7)

## 2025-05-28 PROCEDURE — 86334 IMMUNOFIX E-PHORESIS SERUM: CPT

## 2025-05-28 PROCEDURE — 83521 IG LIGHT CHAINS FREE EACH: CPT

## 2025-05-28 PROCEDURE — 82784 ASSAY IGA/IGD/IGG/IGM EACH: CPT | Mod: 59

## 2025-05-28 PROCEDURE — 36415 COLL VENOUS BLD VENIPUNCTURE: CPT

## 2025-05-28 PROCEDURE — 84075 ASSAY ALKALINE PHOSPHATASE: CPT

## 2025-05-28 PROCEDURE — 85025 COMPLETE CBC W/AUTO DIFF WBC: CPT

## 2025-05-28 PROCEDURE — 84165 PROTEIN E-PHORESIS SERUM: CPT

## 2025-05-29 LAB
ALBUMIN, SPE (OHS): 4.2 G/DL (ref 3.35–5.55)
ALPHA 1 GLOB (OHS): 0.19 GM/DL (ref 0.17–0.41)
ALPHA 2 GLOB (OHS): 0.54 GM/DL (ref 0.43–0.99)
BETA GLOB (OHS): 0.8 GM/DL (ref 0.5–1.1)
GAMMA GLOBULIN (OHS): 0.97 GM/DL (ref 0.67–1.58)
KAPPA LC FREE SER-MCNC: 0.88 MG/L (ref 0.26–1.65)
KAPPA LC FREE/LAMBDA FREE SER: 1.54 MG/DL (ref 0.33–1.94)
LAMBDA LC FREE SERPL-MCNC: 1.75 MG/DL (ref 0.57–2.63)
PATHOLOGIST INTERPRETATION - IFE SERUM (OHS): NORMAL
PATHOLOGIST REVIEW - SPE (OHS): NORMAL
PROT SERPL-MCNC: 6.7 GM/DL (ref 6–8.4)

## 2025-06-06 ENCOUNTER — OFFICE VISIT (OUTPATIENT)
Dept: HEMATOLOGY/ONCOLOGY | Facility: CLINIC | Age: 55
End: 2025-06-06
Attending: STUDENT IN AN ORGANIZED HEALTH CARE EDUCATION/TRAINING PROGRAM
Payer: COMMERCIAL

## 2025-06-06 VITALS
HEART RATE: 90 BPM | HEIGHT: 73 IN | DIASTOLIC BLOOD PRESSURE: 77 MMHG | SYSTOLIC BLOOD PRESSURE: 115 MMHG | RESPIRATION RATE: 18 BRPM | OXYGEN SATURATION: 97 % | WEIGHT: 253.44 LBS | BODY MASS INDEX: 33.59 KG/M2 | TEMPERATURE: 98 F

## 2025-06-06 DIAGNOSIS — D72.820 ATYPICAL LYMPHOCYTOSIS: ICD-10-CM

## 2025-06-06 DIAGNOSIS — M13.80 SERONEGATIVE ARTHRITIS: ICD-10-CM

## 2025-06-06 DIAGNOSIS — D47.9 LYMPHOPROLIFERATIVE DISORDER: Primary | ICD-10-CM

## 2025-06-06 PROCEDURE — 3008F BODY MASS INDEX DOCD: CPT | Mod: CPTII,S$GLB,, | Performed by: INTERNAL MEDICINE

## 2025-06-06 PROCEDURE — 99215 OFFICE O/P EST HI 40 MIN: CPT | Mod: S$GLB,,, | Performed by: INTERNAL MEDICINE

## 2025-06-06 PROCEDURE — 3078F DIAST BP <80 MM HG: CPT | Mod: CPTII,S$GLB,, | Performed by: INTERNAL MEDICINE

## 2025-06-06 PROCEDURE — 3044F HG A1C LEVEL LT 7.0%: CPT | Mod: CPTII,S$GLB,, | Performed by: INTERNAL MEDICINE

## 2025-06-06 PROCEDURE — 99999 PR PBB SHADOW E&M-EST. PATIENT-LVL III: CPT | Mod: PBBFAC,,, | Performed by: INTERNAL MEDICINE

## 2025-06-06 PROCEDURE — 3074F SYST BP LT 130 MM HG: CPT | Mod: CPTII,S$GLB,, | Performed by: INTERNAL MEDICINE

## 2025-06-06 PROCEDURE — 1159F MED LIST DOCD IN RCRD: CPT | Mod: CPTII,S$GLB,, | Performed by: INTERNAL MEDICINE

## 2025-06-06 NOTE — PROGRESS NOTES
"Hematology and Medical Oncology   Follow Up     06/06/2025  Referred by:  Dr. Key Wiley    Reason For Referral: Atypical lymphoid cells.      History of Present Ilness:   Eve Choi III (Eve) is a pleasant 54 y.o.male with seronegative arthritis, hypothyroidism who presents for evaluation of lymphoproliferative disorder.    Presents with his wife. He was initially seen by Hematology in 2015 by Dr. Almazan for leukocytosis. At that time, it was suspected to be due to inflammatory conditions as he was being worked up for autoimmune disorders after knee surgery. For his seronegative arthritis, he has been on multiple agents including methotrexate, humera and most recently on renvoq. Then in February 2025, he was seen by Dr. Georges for evaluation of leukocytosis after being referred by his Rheumatologist Dr. Clavin Fernandes. Flow cytometry showed 15% light-chain restricted B cells that were negative for CD5 and CD10. He was told that this indicated "preleukemia or pre-lymphoma" and to follow up in a year. He overall feels well - no fevers, night sweats, weight loss, appetite issues, pain.     Interval History:  Doing well. Pleased to see lack of abnormal cells on labs.   Working with rheumatology to control seronegative arthritis with immunomodulating drug.     Continues to deny B symptoms.   PAST MEDICAL HISTORY:   Past Medical History:   Diagnosis Date    Arthritis     Hypercholesteremia     Thyroid disease        PAST SURGICAL HISTORY:   Past Surgical History:   Procedure Laterality Date    COLONOSCOPY      COLONOSCOPY N/A 3/25/2021    Procedure: COLONOSCOPY;  Surgeon: Jd Bundy MD;  Location: [address]);  Service: Endoscopy;  Laterality: N/A;  covid test 3/22 primary care, instructions emailed-[facility]    HAND SURGERY  1990    right    KNEE CARTILAGE SURGERY  1993    5 procedures starting in 1985       PAST SOCIAL HISTORY:   reports that he has never smoked. He has never used smokeless tobacco. " He reports current alcohol use of about 4.2 - 5.8 standard drinks of alcohol per week. He reports that he does not use drugs.    FAMILY HISTORY:  Family History   Problem Relation Name Age of Onset    Cancer Mother          bladder, ovarian, melanoma, colon    Coronary artery disease Father      Cancer Maternal Grandmother      Coronary artery disease Paternal Grandmother      Coronary artery disease Paternal Grandfather      Cancer Maternal Uncle          colon cancer     Cancer Maternal Aunt          ovarian, ?    Cancer Maternal Uncle          stomache, prostate       CURRENT MEDICATIONS:   Current Outpatient Medications   Medication Sig    celecoxib (CELEBREX) 200 MG capsule Take 200 mg by mouth.    levothyroxine (SYNTHROID) 50 MCG tablet Take 1 tablet (50 mcg total) by mouth once daily.    multivitamin capsule Take 1 capsule by mouth once daily.    propranoloL (INDERAL) 10 MG tablet Take 1 tablet (10 mg total) by mouth daily as needed (speaking anxiety).    RINVOQ 15 mg 24 hr tablet     rosuvastatin (CRESTOR) 10 MG tablet Take 1 tablet (10 mg total) by mouth every evening.    tirzepatide, weight loss, 10 mg/0.5 mL PnIj Inject 10 mg into the skin every 7 days.    zolpidem (AMBIEN CR) 6.25 MG CR tablet Take 1 tablet (6.25 mg total) by mouth nightly as needed for Insomnia.     No current facility-administered medications for this visit.     ALLERGIES:   Review of patient's allergies indicates:  No Known Allergies      Review of Systems:     Review of Systems   Constitutional:  Negative for fatigue, fever and unexpected weight change.   HENT:   Negative for mouth sores and nosebleeds.    Respiratory:  Negative for cough and shortness of breath.    Cardiovascular:  Negative for chest pain and leg swelling.   Gastrointestinal:  Negative for abdominal pain and nausea.   Genitourinary:  Negative for difficulty urinating and dysuria.    Musculoskeletal:  Positive for arthralgias. Negative for back pain.   Skin:   Negative for rash and wound.   Neurological:  Negative for headaches, light-headedness and numbness.   Psychiatric/Behavioral:  Negative for confusion. The patient is not nervous/anxious.           Physical Exam:     Vitals:    06/06/25 1415   BP: 115/77   Pulse: 90   Resp: 18   Temp: 98 °F (36.7 °C)       Physical Exam  Vitals reviewed.   Constitutional:       General: He is not in acute distress.  HENT:      Head: Normocephalic and atraumatic.      Nose: Nose normal. No congestion.   Eyes:      General: No scleral icterus.     Extraocular Movements: Extraocular movements intact.   Cardiovascular:      Rate and Rhythm: Normal rate and regular rhythm.   Pulmonary:      Effort: Pulmonary effort is normal. No respiratory distress.   Abdominal:      Palpations: Abdomen is soft.      Tenderness: There is no abdominal tenderness.   Musculoskeletal:         General: No swelling. Normal range of motion.      Cervical back: Normal range of motion and neck supple.   Skin:     General: Skin is warm and dry.   Neurological:      General: No focal deficit present.      Mental Status: He is alert and oriented to person, place, and time.   Psychiatric:         Mood and Affect: Mood normal.         Behavior: Behavior normal.         ECOG Performance Status: (foot note - ECOG PS provided by Eastern Cooperative Oncology Group) 0 - Asymptomatic    Karnofsky Performance Score:  100%- Normal, No Complaints, No Evidence of Disease    Labs:   Reviewed   2/10/2025: Flow cytometry with 15% LIGHT-CHAIN RESTRICTED B-CELLS DETECTED, NEGATIVE FOR CD5 AND CD10 (SEE COMMENT)     Clonal CD20+ B-cells coexpress kappa light chain. They comprise 15% of all analyzed white blood cells and demonstrate no significant coexpression of CD5, CD10 or CD11c. Other lymphocytes are immunophenotypically unremarkable. Granulocytes are immunophenotypically mature.     COMMENT: Diagnostic considerations include involvement by monoclonal B-cell lymphocytosis, and  marginal zone lymphomas, lymphoplasmacytic lymphoma or other B-cell lymphoproliferative disorders lacking significant expression of CD5 and CD10. Correlation with morphology, clinical history and other diagnostic information is necessary for specific diagnosis.     Imaging: reviewed      Assessment and Plan:     Mr. Eve Choi is a pleasant 54 year old man who presents for further evaluation of lymphoproliferative disorder.    Lymphoproliferative disorder  - 2/10/25 flow cytometry showed 15% light-chain restricted B-cells, possibly related to previous use of multiple immunomodulator agents for seronegative arthritis vs low grade lymphoproliferative disorder. Other labs show mild anemia with normal WBC and platelets.   - Repeat flow cytometry did not reveal LPL  - PET scan to evaluate for lymphadenopathy and possible lymphoma was also negative  - will continue to follow up in 3 months with labs    Seronegative arthritis  - Follows with Rheumatology  --Previously on renvoq, which he is currently not on. Given worsening pain will discuss other immunomodulatory medications with rheum    30 minutes were spent face to face with the patient and his family to discuss the disease, natural history, treatment options and survival statistics. I have provided the patient with an opportunity to ask questions and have all questions answered to his satisfaction.       he will return to clinic in 3 months, but knows to call in the interim if symptoms change or should a problem arise.      Ashely Smith MD  Hematology and Medical Oncology  Bone Marrow Transplant  [facility]    Route Chart for Scheduling    BMT Chart Routing      Follow up with physician 3 months. 1. see me in 3 months with cbc,cmp, flow cytometry   Follow up with MAUDE    Provider visit type    Infusion scheduling note    Injection scheduling note    Labs    Imaging    Pharmacy appointment    Other referrals

## 2025-06-08 PROBLEM — D72.820 ATYPICAL LYMPHOCYTOSIS: Status: ACTIVE | Noted: 2025-06-08

## 2025-06-17 ENCOUNTER — PATIENT MESSAGE (OUTPATIENT)
Dept: HEMATOLOGY/ONCOLOGY | Facility: CLINIC | Age: 55
End: 2025-06-17
Payer: COMMERCIAL

## 2025-06-17 ENCOUNTER — TELEPHONE (OUTPATIENT)
Dept: HEMATOLOGY/ONCOLOGY | Facility: CLINIC | Age: 55
End: 2025-06-17
Payer: COMMERCIAL

## 2025-06-17 DIAGNOSIS — D47.9 LYMPHOPROLIFERATIVE DISORDER: Primary | ICD-10-CM

## 2025-06-17 NOTE — TELEPHONE ENCOUNTER
Spoke with pt about genetic testing he wants done. Pt stated he received a letter saying that he needs a genetic test done for the MSH2 mutation before 6/23 in order for it to be free. Pt stated he called the number on the paper and was told he needed to reach out to us for a referral. Referral was placed to genetics and pt was informed it was placed as an urgent referral to get him scheduled prior to Monday. Advised pt to reach back out if he does not hear from anyone or if he has any other questions. Pt verbalized understanding.

## 2025-06-17 NOTE — TELEPHONE ENCOUNTER
Copied from CRM #[id number]. Topic: General Inquiry - Patient Advice  >> Jun 17, 2025 10:49 AM Nola wrote:  Type:  Needs Medical Advice    Who Called: Eve Choi III   Calling to have a referral setup to have a genetic testing done   Running up on a dead line and needing to have it done by 6/27  Received a letter to have it done after mom was tested and it came back was to have her children tested   Would the patient rather a call back or a response via [facility]? Call back   Best Call Back Number: [phone]    Additional Information:

## 2025-06-18 ENCOUNTER — TELEPHONE (OUTPATIENT)
Dept: HEMATOLOGY/ONCOLOGY | Facility: CLINIC | Age: 55
End: 2025-06-18
Payer: COMMERCIAL

## 2025-06-24 ENCOUNTER — TELEPHONE (OUTPATIENT)
Dept: HEMATOLOGY/ONCOLOGY | Facility: CLINIC | Age: 55
End: 2025-06-24
Payer: COMMERCIAL

## 2025-06-24 NOTE — TELEPHONE ENCOUNTER
Called patient and left message to confirm the virtual visit on 7/1/25 and to complete the questionnaire.

## 2025-07-01 ENCOUNTER — OFFICE VISIT (OUTPATIENT)
Dept: HEMATOLOGY/ONCOLOGY | Facility: CLINIC | Age: 55
End: 2025-07-01
Payer: COMMERCIAL

## 2025-07-01 DIAGNOSIS — D47.9 LYMPHOPROLIFERATIVE DISORDER: ICD-10-CM

## 2025-07-01 DIAGNOSIS — Z71.83 ENCOUNTER FOR NONPROCREATIVE GENETIC COUNSELING AND TESTING: Primary | ICD-10-CM

## 2025-07-01 DIAGNOSIS — Z80.41 FAMILY HISTORY OF OVARIAN CANCER: ICD-10-CM

## 2025-07-01 DIAGNOSIS — Z80.0 FAMILY HISTORY OF LYNCH SYNDROME: ICD-10-CM

## 2025-07-01 DIAGNOSIS — Z13.71 ENCOUNTER FOR NONPROCREATIVE GENETIC COUNSELING AND TESTING: Primary | ICD-10-CM

## 2025-07-01 NOTE — PROGRESS NOTES
Cancer Genetics  Hereditary and High-Risk Clinic  Department of Hematology and Oncology  [facility]    Date of Service:  25  Visit Provider:  Morgan Vines, [facility], [facility] [place]  Collaborating Physician:  Adi Camara MD    Patient ID  Name: Eve Choi III    : 1970    MRN: [id number]      Referring Provider:   Ashely Smith MD  [address] [place] [address]    Televisit Information  The patient location is:  [place], [place].    The chief complaint leading to consultation is:  As below.    Visit type: audiovisual.    Face-to-face time with patient: 46 minutes.    60 minutes in total were spent on this encounter, which includes face-to-face time and non-face-to-face time preparing to see the patient (e.g., review of tests), obtaining and/or reviewing separately obtained history, documenting clinical information in the electronic or other health record, independently interpreting results (not separately reported) and communicating results to the patient/family/caregiver, or coordinating care (not separately reported).    Each patient provided medical services by telemedicine is:  (1) informed of the relationship between the physician and patient and the respective role of any other health care provider with respect to management of the patient; and (2) notified that he or she may decline to receive medical services by telemedicine and may withdraw from such care at any time.    IMRJUNIOR Choi III is a 54 y.o. yo male who was referred to genetic counseling due to his family history of an MSH2 mutation ( c.942+3A>T; diagnostic for Sosa syndrome) first detected in his mother, for which he meets NCCN guidelines for testing (known familial mutation). A sample will be submitted to [facility] on 7/15/25 for the xG panel, along with insurance information. Results will be available within 2-3 weeks of sample submission.    SUBJECTIVE      Chief  "Complaint:Genetic evaluation (family history of MSH2 pathogenic mutation)    History of Present Illness (HPI):  Eve Choi III ("Eve"), 54 y.o., assigned male sex at birth, is established with the [facility] of H[facility]ology and Oncology but new to me.  He was referred by Dr. Ashely Smith (hem/onc) for hereditary cancer risk assessment given his family history of an MSH2 mutation.    Age:  54 y.o.   Race and ethnicity:  White, Not  or /a  Weight:  253 lb  Height:  6'1"  Previous germline cancer genetic testing:  No    Cancer History  No personal history of cancer   No personal history of masses/tumors/lesions    History of atypical lymphoid cells  Lymphoproliferative disorder  - 2/10/25 flow cytometry showed 15% light-chain restricted B-cells, possibly related to previous use of multiple immunomodulator agents for seronegative arthritis vs low grade lymphoproliferative disorder. Other labs show mild anemia with normal WBC and platelets.   - Repeat flow cytometry did not reveal LPL  - PET scan to evaluate for lymphadenopathy and possible lymphoma was also negative  - will continue to follow up in 3 months with labs    GI History  Most recent colonoscopy: 3/25/21  Colon polyp:  Yes - one hyperplastic polyp, 3 in 2011 (report unavailable)  Pancreatitis:  No    Prostate History  No issues reported  PSA: 0.27 01/23/2024  EARL: N/A    Dermatologic History  No issues reported  Abnormal moles/lesions: No   Skin cancer screening: annually    Focused Social History  Tobacco Use: Low Risk  (6/6/2025)    Patient History     Smoking Tobacco Use: Never     Smokeless Tobacco Use: Never     Passive Exposure: Not on file     FAMILY HISTORY       ONCOLOGY PEDIGREE  Ashkenazi Mu-ism:  No  Consanguinity:  No  Hereditary cancer genetic testing in blood relatives:  Yes - mother MSH2+:  c.942+3A>T     Family Cancer Pedigree:  Pedigree Image    Eve reported his  family history of cancer as follows:   Mother " (80y) diagnosed with ovarian cancer at 40-45y and bladder and colon cancer within the last 10 years  Maternal uncle (86y) diagnosed with stomach cancer at 85y  Maternal uncle ( at 70y) diagnosed with colon cancer at 55y as well as stomach cancer  Maternal uncle ( at 43y) diagnosed with stomach and colon cancer  Maternal first cousin diagnosed with breast and ovarian cancer  Maternal grandmother ( at 40y) diagnosed with cancer, unknown type     A family history of birth defects, intellectual disability, SIDS, sudden early death, multiple miscarriages and consanguinity were denied. Please refer to above pedigree for further details. A larger copy is available for review in the Media tab.     COUNSELING      Approximately 5-10% of cancers are due to hereditary causes. Eve's mother underwent genetic testing due to her personal and family history of cancer and tested positive for a pathogenic mutation in MSH2. This particular mutation is described as pathogenic (c.942+3A>T), which results in Sosa syndrome.    Based on the fact that Eve's mother has a mutation, there is a 50% chance he inherited the same mutation. The only way to definitively determine his status is for him to undergo predictive testing.    Heterozygous germline mutations in MSH2 cause Sosa syndrome. Individuals with Sosa syndrome have significantly increased lifetime risks of developing various types of cancer including colorectal, endometrial (uterine), ovarian, gastric, small bowel, urothelial, pancreatic, biliary tract, prostate, and brain cancer. There are screening and surgery recommendations that can be made for individuals who carry an MSH2 mutation to reduce their risk of cancer, or to detect cancer at an early stage.     We discussed that MSH2 is inherited in an autosomal dominant pattern. When an individual has an MSH2 mutation, there is a 50% chance s/he could pass it on to the next generation. Based on the  family history, we know this is being inherited from Eve's maternal side.    If Eve tests positive for the familial mutations, then it is recommended for his children to consider undergoing predictive genetic testing. We discussed that MSH2 mutations are not associated with pediatric cancers, and thus predictive testing is not recommended until age 18.  Regardless of his genetic test results, his brother and cousins could be at risk to carry the familial mutation, and are encouraged to consider undergoing predictive testing.    Furthermore, we discussed the psychosocial implications of a positive result, including anxiety, fear and guilt if a mutation is passed on to a child.    Possible Results:    Positive (pathogenic or likely pathogenic variant): A genetic variant was found that is suspected or known to impact the function of the gene. The impact of a positive result on an individual's risk of cancer varies based on the gene, specific variant, individual's sex assigned at birth, personal cancer history, other health history (such as surgical history), and family history. A positive result can impact screening and risk management recommendations. However, there are not always available guidelines for management based on a specific gene variant. Family history and personal risk factors should always be considered. Sometimes, a positive result can also have treatment or reproductive implications.   Negative: No clinically significant variants were reported in the tested genes. A negative result does not indicate that an individual cannot develop cancer or even that the individual is at average risk. An individual may still be at an increased risk for cancer based on personal risk factors or family history. Additionally, there could be a hereditary cancer predisposition that was not included in a chosen panel or is not detected with current technology.   Variant of Uncertain Significance (VUS): A variant was  found. However, the lab does not have enough information to determine if the variant is benign (harmless) or pathogenic (impacts the function of the gene). The laboratory may update (reclassify) the variant over time as more information becomes available. When reclassified, most variants of uncertain significance are reclassified to benign/likely benign. Typically, it is not recommended to  based on the presence of a VUS. The chance of finding a VUS varies based on the test performed. Generally, the chance of finding a VUS increases with the number of genes tested and decreases with the amount of testing of that gene (genes that are tested more frequently or for a longer period of time have a lower VUS rate).    Genetic Mutation Inheritance:  When an individual has a gene mutation, their first-degree relatives (parents, children, and siblings) each have a 50% chance of carrying the same mutation. Other, more distant blood relatives can also be at risk of carrying the same mutation. At-risk relatives of an individual with a mutation should consider genetic testing to help determine their risk for cancer.     Genetic Discrimination: The Genetic Information Nondiscrimination Act of 2008 (MARLENE) is a [place] federal law that provides some protections against the use of an individual's genetic information by their health insurer and by their employer. Title I of MARLENE prohibits most health insurers (except for insurance obtained through a job with the  or the Federal Employees Health Benefits Plan) from utilizing an individual's genetic information to make decisions regarding insurance eligibility or premium charges. Title II of MARLENE prohibits covered entities from requesting or requiring the genetic information of employees and applicants and from using said information to make employment decisions. This does not apply to employers with fewer than 15 employees or to the [employer].  MARLENE also does not  protect individuals from genetic discrimination by any other type of policy or entity, including but not limited to life insurance, disability insurance, long-term care insurance,  benefits, and [facility] benefits.    There is also a possibility for the patient to incur out-of-pocket costs related to this testing. Eve appeared to have a good understanding of the information as she asked appropriate questions.  Eve received comprehensive counseling regarding panel testing and has elected to proceed with this testing. A sample was scheduled to be submitted on 7/15/25 to [facility] .  Eve's results should be available in approximately 3 weeks.  In the meantime, he is welcome to contact me if he has any questions, concerns, or updates to his family history.     ASSESSMENT / PLAN      Genetic Test Information:  Testing lab: [facility]   Test panel: xG   ICD-10 code(s): Z80.0, Z80.41   Verbal informed consent: Obtained   Specimen type: Blood  (Patient denies blood disorders that would necessitate a skin fibroblast specimen)   Spec[facility] by[facility]   Specimen collection date: 7/15/2025   Results expected by: Approximately 2-3 weeks after the genetic testing lab receives the specimen   Results disclosure plan: Post-test visit if positive or complex result; otherwise, results will be communicated by phone call        Follow-up: Post-test genetic counseling will be conducted once the genetic testing results are available.    Eve appeared to have a good understanding of the information. Questions were encouraged and answered to the patient's satisfaction, and he verbalized understanding of the information and agreement with the plan.   Eve is welcome to contact me if he has any questions, concerns, or updates to his family history.     This assessment is based on the history and reports provided, as well as the current scientific knowledge regarding cancer  genetics.     Morgan Vines, MGC, [facility] [place]  Certified Genetic Counselor  Department of Hematology and Oncology  [facility]    CC:  Dr. Ashely Smith

## 2025-07-15 ENCOUNTER — LAB VISIT (OUTPATIENT)
Dept: LAB | Facility: HOSPITAL | Age: 55
End: 2025-07-15
Payer: COMMERCIAL

## 2025-07-15 DIAGNOSIS — Z80.0 FAMILY HISTORY OF LYNCH SYNDROME: ICD-10-CM

## 2025-07-15 PROCEDURE — 36415 COLL VENOUS BLD VENIPUNCTURE: CPT

## 2025-07-20 LAB
GENE DIS ANL INTERP-IMP: NORMAL
TEMPUS GENES ANALYZED: NORMAL
TEMPUS INTERPRETATION REPORT: NORMAL
TEMPUS PORTAL: NORMAL

## 2025-07-22 NOTE — PROGRESS NOTES
"Subjective:      Patient ID: Eve Choi III is a 54 y.o. male.    Chief Complaint: Disease Management            Pertinent negatives include no fever, trouble swallowing or headaches.         Rheum hx:  - Symptoms started about 15 years ago - had high inflammatory markers and had full body synovitis after a knee replacement. 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  - Previously MTX, humira, cosentyx, infusions, enbrel ultimately ended up on rinvoq. Has been on rivoq for at least one year. Does work very well.       Oncologist reiterated that rinvoq is fine. She plans to monitor for at [facility] the next year.     No joints have given him any trouble. Working out lately. Gets a little sore with activity. No rashes. Celebrex - takes generally.     No infections or medical changes.     Pain 1/10.        Review of Systems   Constitutional:  Negative for fever and unexpected weight change.   HENT:  Negative for mouth sores and trouble swallowing.    Eyes:  Negative for redness.   Respiratory:  Negative for cough and shortness of breath.    Cardiovascular:  Negative for chest pain.   Gastrointestinal:  Negative for constipation and diarrhea.   Genitourinary:  Negative for genital sores.   Skin:  Negative for rash.   Neurological:  Negative for headaches.   Hematological:  Does not bruise/bleed easily.        Objective:   /77 (BP Location: Right arm)   Pulse 69   Ht 6' 1" (1.854 m)   Wt 113.1 kg (249 lb 5.4 oz)   BMI 32.90 kg/m²   Physical Exam   Constitutional: He is oriented to person, place, and time. No distress.   HENT:   Head: Normocephalic and atraumatic.   Cardiovascular: Normal rate, regular rhythm and normal heart sounds.   Pulmonary/Chest: Effort normal and breath sounds normal. No respiratory distress.   Abdominal: Soft.   Musculoskeletal:         General: No swelling or tenderness. Normal range of motion.   Neurological: He is alert and oriented to person, place, and time.   Skin: Skin is " warm and dry. No rash noted.   Psychiatric: His behavior is normal. Judgment and thought content normal.        5/14/2025 7/23/2025   Tender (VICTOR-28) 1 / 28 1 / 28    Swollen (VICTOR-28) 1 / 28 1 / 28    Provider Global -- --   Patient Global 30 / 100 5 / 100   ESR -- --   CRP -- --   VICTOR-28 (ESR) -- --   VICTOR-28 (CRP) -- --   CDAI Score -- --        Assessment:     1. Seronegative arthritis    2. Psoriatic arthropathy        Peripheral Spondyloarthritis  Diagnosis/Important Hx: Clinical diagnosis, primarily tendon involvement, has had synovitis in the past  Relevant serologies: Negative HLA-B27, negative other markers, DARLIN 1:160 homogenous  Previous Therapy: MTX, chronic low dose steroids, humira, enbrel, cosentyx, ?infusions  Current Therapy: Rinvoq 15mg daily  Imaging/Procedures:    Has had numerous previous MRIs that showed DDD of LB  Other notes: Possible IBD hx from 20s  PLAN  - Continue Rinvoq   - F/u in 4 months    Rheumatology Health Maintenance  Vaccinations: Due for COVID and pneumonia - doesn't want  Medication monitoring:    Rinvoq - has pre-cancer, proceeding cautiously    Last lipid panel 1/2025  Osteoporosis:    DEXA - N/A   Treatment -   Cardiovascular risk:    BP - appropriate   Lipids - LDL 50 1/2025, on low dose crestor   Glucose - A1c 5.1 1/2025      Problem List Items Addressed This Visit          Orthopedic    Seronegative arthritis - Primary    Psoriatic arthropathy       This patient encounter was staffed and the plan was formulated with rheumatology attending Dr. Guzmán.    Amanda Eng MD  Rheumatology Fellow, PGY-5

## 2025-07-23 ENCOUNTER — OFFICE VISIT (OUTPATIENT)
Dept: RHEUMATOLOGY | Facility: CLINIC | Age: 55
End: 2025-07-23
Payer: COMMERCIAL

## 2025-07-23 ENCOUNTER — TELEPHONE (OUTPATIENT)
Dept: HEMATOLOGY/ONCOLOGY | Facility: CLINIC | Age: 55
End: 2025-07-23
Payer: COMMERCIAL

## 2025-07-23 VITALS
SYSTOLIC BLOOD PRESSURE: 120 MMHG | DIASTOLIC BLOOD PRESSURE: 77 MMHG | HEIGHT: 73 IN | HEART RATE: 69 BPM | BODY MASS INDEX: 33.04 KG/M2 | WEIGHT: 249.31 LBS

## 2025-07-23 DIAGNOSIS — L40.59 OTHER PSORIATIC ARTHROPATHY: ICD-10-CM

## 2025-07-23 DIAGNOSIS — M13.80 SERONEGATIVE ARTHRITIS: Primary | ICD-10-CM

## 2025-07-23 PROCEDURE — 99214 OFFICE O/P EST MOD 30 MIN: CPT | Mod: S$GLB,,, | Performed by: INTERNAL MEDICINE

## 2025-07-23 PROCEDURE — 1159F MED LIST DOCD IN RCRD: CPT | Mod: CPTII,S$GLB,, | Performed by: INTERNAL MEDICINE

## 2025-07-23 PROCEDURE — 3074F SYST BP LT 130 MM HG: CPT | Mod: CPTII,S$GLB,, | Performed by: INTERNAL MEDICINE

## 2025-07-23 PROCEDURE — 3078F DIAST BP <80 MM HG: CPT | Mod: CPTII,S$GLB,, | Performed by: INTERNAL MEDICINE

## 2025-07-23 PROCEDURE — 99999 PR PBB SHADOW E&M-EST. PATIENT-LVL III: CPT | Mod: PBBFAC,,, | Performed by: STUDENT IN AN ORGANIZED HEALTH CARE EDUCATION/TRAINING PROGRAM

## 2025-07-23 PROCEDURE — 3044F HG A1C LEVEL LT 7.0%: CPT | Mod: CPTII,S$GLB,, | Performed by: INTERNAL MEDICINE

## 2025-07-23 PROCEDURE — 3008F BODY MASS INDEX DOCD: CPT | Mod: CPTII,S$GLB,, | Performed by: INTERNAL MEDICINE

## 2025-07-23 RX ORDER — CELECOXIB 200 MG/1
200 CAPSULE ORAL DAILY
Qty: 90 CAPSULE | Refills: 1 | Status: SHIPPED | OUTPATIENT
Start: 2025-07-23

## 2025-07-23 RX ORDER — UPADACITINIB 15 MG/1
15 TABLET, EXTENDED RELEASE ORAL DAILY
Qty: 90 TABLET | Refills: 1 | Status: SHIPPED | OUTPATIENT
Start: 2025-07-23

## 2025-07-23 ASSESSMENT — ROUTINE ASSESSMENT OF PATIENT INDEX DATA (RAPID3)
TOTAL RAPID3 SCORE: 0.5
PAIN SCORE: 1
PSYCHOLOGICAL DISTRESS SCORE: 1.1
MDHAQ FUNCTION SCORE: 0
AM STIFFNESS SCORE: 0, NO
FATIGUE SCORE: 1
PATIENT GLOBAL ASSESSMENT SCORE: 0.5

## 2025-07-23 NOTE — PROGRESS NOTES
7/16/2025     9:57 AM   Rapid3 Question Responses and Scores   MDHAQ Score 0   Psychologic Score 1.1   Pain Score 1   When you awakened in the morning OVER THE LAST WEEK, did you feel stiff? No   Fatigue Score 1   Global Health Score 0.5   RAPID3 Score 0.5    Answers submitted by the patient for this visit:  Rheumatology Questionnaire (Submitted on 7/16/2025)  fever: No  eye redness: No  mouth sores: No  headaches: No  shortness of breath: No  chest pain: No  trouble swallowing: No  diarrhea: No  constipation: No  unexpected weight change: No  genital sore: No  During the last 3 days, have you had a skin rash?: No  Bruises or bleeds easily: No  cough: No

## 2025-07-23 NOTE — PROGRESS NOTES
I have personally reviewed the history, confirmed exam findings, and discussed assessment and plan with fellow.       Answers submitted by the patient for this visit:  Rheumatology Questionnaire (Submitted on 7/16/2025)  fever: No  eye redness: No  mouth sores: No  headaches: No  shortness of breath: No  chest pain: No  trouble swallowing: No  diarrhea: No  constipation: No  unexpected weight change: No  genital sore: No  During the last 3 days, have you had a skin rash?: No  Bruises or bleeds easily: No  cough: No       Latest Reference Range & Units 05/28/25 08:04   WBC 3.90 - 12.70 K/uL 9.70   RBC 4.60 - 6.20 M/uL 4.84   Hemoglobin 14.0 - 18.0 gm/dL 14.2   Hematocrit 40.0 - 54.0 % 43.6   MCV 82 - 98 fL 90   MCH 27.0 - 31.0 pg 29.3   MCHC 32.0 - 36.0 g/dL 32.6   RDW 11.5 - 14.5 % 13.2   Platelet Count 150 - 450 K/uL 356   MPV 9.2 - 12.9 fL 9.7   Neut % 38 - 73 % 45.3   Lymph % 18 - 48 % 46.1   Mono % 4 - 15 % 7.4   Eos % <=8 % 0.5   Basophil % <=1.9 % 0.3   Immature Granulocytes 0.0 - 0.5 % 0.4   Gran # (ANC) 1.8 - 7.7 K/uL 4.39   Lymph # 1 - 4.8 K/uL 4.47   Mono # 0.3 - 1 K/uL 0.72   Eos # <=0.5 K/uL 0.05   Baso # <=0.2 K/uL 0.03   Immature Grans (Abs) 0.00 - 0.04 K/uL 0.04   nRBC <=0 /100 WBC 0   Sodium 136 - 145 mmol/L 140   Potassium 3.5 - 5.1 mmol/L 4.7   Chloride 95 - 110 mmol/L 106   CO2 23 - 29 mmol/L 27   Anion Gap 8 - 16 mmol/L 7 (L)   BUN 6 - 20 mg/dL 17   Creatinine 0.5 - 1.4 mg/dL 1.1   eGFR >60 mL/min/1.73/m2 >60   Glucose 70 - 110 mg/dL 94   Calcium 8.7 - 10.5 mg/dL 9.0   ALP 40 - 150 unit/L 49   PROTEIN TOTAL 6.0 - 8.4 gm/dL  6.0 - 8.4 gm/dL 6.7  7.2   Albumin 3.5 - 5.2 g/dL 3.9   BILIRUBIN TOTAL 0.1 - 1.0 mg/dL 0.6   AST 11 - 45 unit/L 32   ALT 10 - 44 unit/L 32   Albumin grams/dl 3.35 - 5.55 g/dL 4.20   Alpha 1 Glob 0.17 - 0.41 gm/dl 0.19   Alpha 2 Glob 0.43 - 0.99 gm/dl 0.54   Beta Glob 0.50 - 1.10 gm/dl 0.80   Gamma Globulin 0.67 - 1.58 gm/dl 0.97   Pathologist Interpretation SPE  Normal total  protein, normal pattern.           Interpreting Pathologist: Angie Zamorano MD       Pathologist Interpretation HAZEL  No monoclonal peaks identified.          Interpreting Pathologist: Angie Zamorano MD       Kappa Free Light Chain 0.33 - 1.94 mg/dL 1.54   LAMBDA FREE LIGHT CHAIN 0.57 - 2.63 mg/dL 1.75   Kappa/Lambda FLC Ratio 0.26 - 1.65  0.88   IgA Level 40 - 350 mg/dL 293   IgG 650 - 1,600 mg/dL 1,008   IgM 50 - 300 mg/dL 77   (L): Data is abnormally low   Latest Reference Range & Units 05/14/25 09:36   DARLIN Negative <1:80  Positive !   ds DNA Ab Negative 1:10  Negative 1:10   Anti-SSA Interpretation Negative  Negative   Anti-SSB Interpretation  Negative   Anti Sm Antibody Ratio 0.10   Anti-Sm/RNP Interpretation Negative  Negative   SM/RNP Antibody Ratio 0.07   SSA Antibody 0.00 - 0.99 Ratio 0.04   SSB Antibody Ratio 0.05   Rheumatoid Factor <=15.0 IU/mL <13.0   !: Data is abnormal   05/14/25 09:36   B27 Testing Date 05/21/2025 01:29 AM   HLA B27 Interpretation TAQ: [id number]   HLA B27 Result Negative      Latest Reference Range & Units 05/14/25 09:36   Hepatitis A Antibody IgG -  Reactive !   Hep B Core Total Ab Non-Reactive  Non-Reactive   Hep B S Ab -  Reactive   Hepatitis B Surface Ag Non-Reactive  Non-Reactive   Hepatitis B Surface Antibody Quantitative mIU/mL 96.37   Hepatitis C Ab Non-Reactive  Non-Reactive   QuantiFERON-TB Gold Plus Negative  Negative   Quantiferon Nil Value  0.81254   TB1 Ag minus Nil  <0.98768   TB2 Ag minus Nil  <0.29485   Mitogen minus Nil  9.02425   HIV 1/2 Ag/Ab Non-Reactive  Non-Reactive   Treponema Pallidum Antibodies (IgG, IgM) Non-Reactive  Non-Reactive   Varicella IgG S/CO 24.200   Varicella Interpretation  Positive   !: Data is abnormal    EXAMINATION:  XR HAND COMPLETE 3 VIEWS BILATERAL     CLINICAL HISTORY:  . Other specified arthritis, unspecified site     TECHNIQUE:  PA, lateral, and oblique views of both hands were performed.     COMPARISON:  None      FINDINGS:  Minor DJD left 1st metacarpal-carpal joint.  Bone, joint soft tissues otherwise normal.     Impression:     No fracture dislocation.  Additional findings above.        Electronically signed by:Doug Alcala MD  Date:                                            05/19/2025  Time:                                           11:39              ASSESSMENT:     Seronegative polyarthritis(RF  negative, DARLIN positive, B27 negative, peripheral spondylarthritis \  H/o Rx with methotrexate, adalimumab, etanercept, secukinumab(no GI issues) then upadacitinib x 1 year he briefly stopped but resumed after PET-CT per Dr. Smith's approval   TJ 0 SJ 0 pt global 5 ESR CRP    DAS28  WFL49-WVD CDAI 1(remission)  DAPSA  TJ 0  SJ 0  Pt global 0.5   Pt pain 1 CRP= 1.5(remission)     Non-inflammatory back pain, nl SIJ radiographs  H/o left lumbar radiculopathy  Severe DDD L5-S1  +Fabere left   H/o UC while in college without recurrence last colonoscopy 2021  Denies personal or family history of psoriasis  S/p left TKA 1 year ago Dr. Berman  Hearing loss  ? Cogan's  ? AAV  Low grade B cell lymphoproliferative disorder  Lumbar spondylosis follows with Dr. Bauman  in Pain Management  Hypothyroidism  on levothyroxine 50mg mcg daily   ASCVD on rosuvastatin 10mg daily   H/o kidney stones  + family h/o colon cancer  Class 1 obesity Body mass index is 32.9 kg/m².on tirzepatidie 10mg sc weekly   Hyperlipidemia LDL 50  1/7/25     Try again to Obtain records from Dr. Fernandes   CBC, CMP, ESR CRP   ACPA   MRI SIJ  Cont upadacitinib 15mg daily for now. OK with Dr. Smith in Hematology-Oncology   Cont f/u Dr. Smith in Hematology-Oncology q 3 months  RTC  3 months with CBC,CMP ESR CRP

## 2025-07-23 NOTE — PATIENT INSTRUCTIONS
It was nice to see you!    We will continue your current regimen and plan to follow up in about 4 months! Please give us a call late November if you don't have an appointment scheduled to ensure you are seen.      Amanda Eng MD  Rheumatology Fellow, PGY-5

## 2025-07-23 NOTE — TELEPHONE ENCOUNTER
Date of Service:  25  Visit Provider:  Morgan Vines, [facility], [facility]  Collaborating Physician:  Adi Camara MD    Patient ID  Name: Eve Choi III    : 1970    MRN: [id number]      Referring Provider:   Ashely Smith MD  [address] [place] [address]    Impression    xG panel genetic testing was negative for actionable mutations in 40 genes associated with hereditary breast, gastrointestinal, gynecologic, pancreatic, prostate and skin cancers. A voicemail was left on 25 encouraging Israel to call back to further discuss his results.    Discussion    Genetic Test Results    Israel had a sample submitted on 7/15/25 to [facility] for panel testing. This panel includes sequencing and/or deletion/duplication analysis of the following genes:    APC, GUILLERMO, BAP1, BARD1, BMPR1A, BRCA1, BRCA2, BRIP1, CDH1, CDKN2A, CHEK2, FH, FLCN, MET, MLH1, MSH2, MSH6, MUTYH, NF1, NTHL1, PALB2, PMS2, PTEN, RAD51C, RAD51D, SMAD4, STK11, TP53, TSC1, TSC2 and VHL (sequencing and deletion/duplication); AXIN2, HOXB13, MBD4, MSH3, POLD1 and POLE (sequencing only); EPCAM and GREM1 (deletion/duplication only).        The results were negative for actionable mutations in any of these genes. Israel's results were negative for the known familial  MSH2 mutation ( [id number]). This result reduces the chance that he could have a hereditary predisposition to cancer due to any of the tested genes. He is not considered to be at high risk for Sosa syndrome related cancers, though he should still undergo screening based on his family history.    Cancer Screening Recommendations:    Colorectal (National Comprehensive Cancer Network)  The National Comprehensive Cancer Network (NCCN) recommends that individuals with a second- or third-degree relative with colorectal cancer have colonoscopies at least every 10 years (or more often based on findings) starting at age 45.    Prostate (National Comprehensive Cancer Network)  Discuss risks and  benefits of prostate cancer screening with your doctor starting at age 45. Screening is usually done through a blood test called PSA but may also include a physical exam (EARL - digital rectal exam). How often these tests need to be repeated is based on your results. It is important to let your doctor know about any medications you are taking as some medications impact the results of the PSA test.       Skin (American Academy of Dermatology)  Complete regular skin self-exams and report any new spots to a healthcare provider.     References:  NCCN Clinical Practice Guidelines in Oncology. Colorectal Cancer Screening. Version 1.2024  NCCN Clinical Practice Guidelines in Oncology. Prostate Cancer Early Detection. Version  1.2025  AAD Statement on USPSTF Recommendation on Skin Cancer Screening. 2023.     Family Members    Israel's close relatives may still be at increased risk of cancer given the family history. His brother and maternal relatives should still consider testing given the known familial MSH2 mutation.    Israel was provided with an electronic copy of his results report. He is encouraged to contact cancer genetics if there are any updates to his personal or family history, or if he has any questions or concerns.    This assessment is based on the history and reports provided, as well as the current scientific knowledge regarding cancer genetics.    Morgan Vines MGGABRIELA, [facility]  Certified Genetic Counselor  Department of Hematology and Oncology  [facility]

## 2025-07-28 ENCOUNTER — PATIENT MESSAGE (OUTPATIENT)
Dept: HEMATOLOGY/ONCOLOGY | Facility: CLINIC | Age: 55
End: 2025-07-28
Payer: COMMERCIAL

## 2025-09-04 ENCOUNTER — TELEPHONE (OUTPATIENT)
Dept: UROLOGY | Facility: HOSPITAL | Age: 55
End: 2025-09-04
Payer: COMMERCIAL

## 2025-09-04 ENCOUNTER — TELEPHONE (OUTPATIENT)
Dept: UROLOGY | Facility: CLINIC | Age: 55
End: 2025-09-04
Payer: COMMERCIAL

## 2025-09-04 DIAGNOSIS — N20.1 RIGHT URETERAL CALCULUS: Primary | ICD-10-CM

## 2025-09-04 DIAGNOSIS — N20.1 URETERAL CALCULUS, RIGHT: ICD-10-CM

## 2025-09-04 RX ORDER — LIDOCAINE HYDROCHLORIDE 20 MG/ML
JELLY TOPICAL ONCE
OUTPATIENT
Start: 2025-09-04 | End: 2025-09-04